# Patient Record
Sex: FEMALE | Race: WHITE | NOT HISPANIC OR LATINO | Employment: UNEMPLOYED | URBAN - METROPOLITAN AREA
[De-identification: names, ages, dates, MRNs, and addresses within clinical notes are randomized per-mention and may not be internally consistent; named-entity substitution may affect disease eponyms.]

---

## 2017-01-04 ENCOUNTER — HOSPITAL ENCOUNTER (EMERGENCY)
Facility: HOSPITAL | Age: 32
Discharge: HOME | End: 2017-01-04
Attending: EMERGENCY MEDICINE | Admitting: EMERGENCY MEDICINE
Payer: COMMERCIAL

## 2017-01-04 ENCOUNTER — APPOINTMENT (EMERGENCY)
Dept: RADIOLOGY | Facility: HOSPITAL | Age: 32
End: 2017-01-04
Payer: COMMERCIAL

## 2017-01-04 VITALS
HEART RATE: 78 BPM | OXYGEN SATURATION: 100 % | DIASTOLIC BLOOD PRESSURE: 70 MMHG | WEIGHT: 144.62 LBS | RESPIRATION RATE: 18 BRPM | SYSTOLIC BLOOD PRESSURE: 110 MMHG | TEMPERATURE: 97.7 F

## 2017-01-04 DIAGNOSIS — R00.2 RAPID PALPITATIONS: Primary | ICD-10-CM

## 2017-01-04 LAB
ANION GAP SERPL CALCULATED.3IONS-SCNC: 9 MMOL/L (ref 4–13)
ATRIAL RATE: 79 BPM
BASOPHILS # BLD AUTO: 0.02 THOUSANDS/ΜL (ref 0–0.1)
BASOPHILS NFR BLD AUTO: 0 % (ref 0–1)
BILIRUB UR QL STRIP: NEGATIVE
BUN SERPL-MCNC: 18 MG/DL (ref 5–25)
CALCIUM SERPL-MCNC: 9.3 MG/DL (ref 8.3–10.1)
CHLORIDE SERPL-SCNC: 105 MMOL/L (ref 100–108)
CLARITY UR: CLEAR
CO2 SERPL-SCNC: 27 MMOL/L (ref 21–32)
COLOR UR: YELLOW
CREAT SERPL-MCNC: 0.75 MG/DL (ref 0.6–1.3)
EOSINOPHIL # BLD AUTO: 0.07 THOUSAND/ΜL (ref 0–0.61)
EOSINOPHIL NFR BLD AUTO: 1 % (ref 0–6)
ERYTHROCYTE [DISTWIDTH] IN BLOOD BY AUTOMATED COUNT: 13.9 % (ref 11.6–15.1)
GFR SERPL CREATININE-BSD FRML MDRD: >60 ML/MIN/1.73SQ M
GLUCOSE SERPL-MCNC: 106 MG/DL (ref 65–140)
GLUCOSE UR STRIP-MCNC: NEGATIVE MG/DL
HCG UR QL: NEGATIVE
HCT VFR BLD AUTO: 36.7 % (ref 34.8–46.1)
HGB BLD-MCNC: 11.7 G/DL (ref 11.5–15.4)
HGB UR QL STRIP.AUTO: NEGATIVE
KETONES UR STRIP-MCNC: NEGATIVE MG/DL
LEUKOCYTE ESTERASE UR QL STRIP: NEGATIVE
LYMPHOCYTES # BLD AUTO: 1.33 THOUSANDS/ΜL (ref 0.6–4.47)
LYMPHOCYTES NFR BLD AUTO: 22 % (ref 14–44)
MCH RBC QN AUTO: 28.3 PG (ref 26.8–34.3)
MCHC RBC AUTO-ENTMCNC: 31.9 G/DL (ref 31.4–37.4)
MCV RBC AUTO: 89 FL (ref 82–98)
MONOCYTES # BLD AUTO: 0.34 THOUSAND/ΜL (ref 0.17–1.22)
MONOCYTES NFR BLD AUTO: 6 % (ref 4–12)
NEUTROPHILS # BLD AUTO: 4.44 THOUSANDS/ΜL (ref 1.85–7.62)
NEUTS SEG NFR BLD AUTO: 71 % (ref 43–75)
NITRITE UR QL STRIP: NEGATIVE
P AXIS: 81 DEGREES
PH UR STRIP.AUTO: 8 [PH] (ref 4.5–8)
PLATELET # BLD AUTO: 173 THOUSANDS/UL (ref 149–390)
PMV BLD AUTO: 11.8 FL (ref 8.9–12.7)
POTASSIUM SERPL-SCNC: 3.8 MMOL/L (ref 3.5–5.3)
PR INTERVAL: 166 MS
PROT UR STRIP-MCNC: NEGATIVE MG/DL
QRS AXIS: 71 DEGREES
QRSD INTERVAL: 108 MS
QT INTERVAL: 418 MS
QTC INTERVAL: 479 MS
RBC # BLD AUTO: 4.14 MILLION/UL (ref 3.81–5.12)
SODIUM SERPL-SCNC: 141 MMOL/L (ref 136–145)
SP GR UR STRIP.AUTO: 1.01 (ref 1–1.03)
T WAVE AXIS: 65 DEGREES
TSH SERPL DL<=0.05 MIU/L-ACNC: 1.45 UIU/ML (ref 0.36–3.74)
UROBILINOGEN UR QL STRIP.AUTO: 0.2 E.U./DL
VENTRICULAR RATE: 79 BPM
WBC # BLD AUTO: 6.2 THOUSAND/UL (ref 4.31–10.16)

## 2017-01-04 PROCEDURE — 71020 HB CHEST X-RAY 2VW FRONTAL&LATL: CPT

## 2017-01-04 PROCEDURE — 80048 BASIC METABOLIC PNL TOTAL CA: CPT | Performed by: EMERGENCY MEDICINE

## 2017-01-04 PROCEDURE — 96361 HYDRATE IV INFUSION ADD-ON: CPT

## 2017-01-04 PROCEDURE — 96360 HYDRATION IV INFUSION INIT: CPT

## 2017-01-04 PROCEDURE — 93005 ELECTROCARDIOGRAM TRACING: CPT

## 2017-01-04 PROCEDURE — 99285 EMERGENCY DEPT VISIT HI MDM: CPT

## 2017-01-04 PROCEDURE — 81025 URINE PREGNANCY TEST: CPT | Performed by: EMERGENCY MEDICINE

## 2017-01-04 PROCEDURE — 84443 ASSAY THYROID STIM HORMONE: CPT | Performed by: EMERGENCY MEDICINE

## 2017-01-04 PROCEDURE — 81003 URINALYSIS AUTO W/O SCOPE: CPT | Performed by: EMERGENCY MEDICINE

## 2017-01-04 PROCEDURE — 36415 COLL VENOUS BLD VENIPUNCTURE: CPT | Performed by: EMERGENCY MEDICINE

## 2017-01-04 PROCEDURE — 85025 COMPLETE CBC W/AUTO DIFF WBC: CPT | Performed by: EMERGENCY MEDICINE

## 2017-01-04 PROCEDURE — 93005 ELECTROCARDIOGRAM TRACING: CPT | Performed by: EMERGENCY MEDICINE

## 2017-01-04 RX ORDER — DIPHENOXYLATE HYDROCHLORIDE AND ATROPINE SULFATE 2.5; .025 MG/1; MG/1
1 TABLET ORAL DAILY
COMMUNITY

## 2017-01-04 RX ADMIN — SODIUM CHLORIDE 1000 ML: 0.9 INJECTION, SOLUTION INTRAVENOUS at 10:26

## 2017-01-11 ENCOUNTER — ALLSCRIPTS OFFICE VISIT (OUTPATIENT)
Dept: OTHER | Facility: OTHER | Age: 32
End: 2017-01-11

## 2017-02-07 ENCOUNTER — HOSPITAL ENCOUNTER (OUTPATIENT)
Dept: NON INVASIVE DIAGNOSTICS | Facility: CLINIC | Age: 32
Discharge: HOME | End: 2017-02-07
Payer: COMMERCIAL

## 2017-02-07 DIAGNOSIS — R00.2 PALPITATIONS: ICD-10-CM

## 2017-02-07 PROCEDURE — 93225 XTRNL ECG REC<48 HRS REC: CPT

## 2017-02-07 PROCEDURE — 93226 XTRNL ECG REC<48 HR SCAN A/R: CPT

## 2017-02-10 ENCOUNTER — GENERIC CONVERSION - ENCOUNTER (OUTPATIENT)
Dept: OTHER | Facility: OTHER | Age: 32
End: 2017-02-10

## 2017-11-20 ENCOUNTER — GENERIC CONVERSION - ENCOUNTER (OUTPATIENT)
Dept: OTHER | Facility: OTHER | Age: 32
End: 2017-11-20

## 2017-11-29 ENCOUNTER — ALLSCRIPTS OFFICE VISIT (OUTPATIENT)
Dept: OTHER | Facility: OTHER | Age: 32
End: 2017-11-29

## 2017-11-29 ENCOUNTER — GENERIC CONVERSION - ENCOUNTER (OUTPATIENT)
Dept: OTHER | Facility: OTHER | Age: 32
End: 2017-11-29

## 2017-11-29 DIAGNOSIS — Z3A.10 10 WEEKS GESTATION OF PREGNANCY: ICD-10-CM

## 2017-12-01 ENCOUNTER — GENERIC CONVERSION - ENCOUNTER (OUTPATIENT)
Dept: OTHER | Facility: OTHER | Age: 32
End: 2017-12-01

## 2017-12-01 ENCOUNTER — ALLSCRIPTS OFFICE VISIT (OUTPATIENT)
Dept: PERINATAL CARE | Facility: CLINIC | Age: 32
End: 2017-12-01
Payer: COMMERCIAL

## 2017-12-01 PROCEDURE — 76801 OB US < 14 WKS SINGLE FETUS: CPT | Performed by: OBSTETRICS & GYNECOLOGY

## 2017-12-04 ENCOUNTER — TRANSCRIBE ORDERS (OUTPATIENT)
Dept: LAB | Facility: CLINIC | Age: 32
End: 2017-12-04

## 2017-12-04 DIAGNOSIS — Z3A.10 10 WEEKS GESTATION OF PREGNANCY: Primary | ICD-10-CM

## 2017-12-11 ENCOUNTER — APPOINTMENT (OUTPATIENT)
Dept: LAB | Facility: CLINIC | Age: 32
End: 2017-12-11
Payer: COMMERCIAL

## 2017-12-11 DIAGNOSIS — Z3A.10 10 WEEKS GESTATION OF PREGNANCY: ICD-10-CM

## 2017-12-11 LAB
ABO GROUP BLD: NORMAL
ALBUMIN SERPL BCP-MCNC: 3.5 G/DL (ref 3.5–5)
ALP SERPL-CCNC: 55 U/L (ref 46–116)
ALT SERPL W P-5'-P-CCNC: 20 U/L (ref 12–78)
ANION GAP SERPL CALCULATED.3IONS-SCNC: 7 MMOL/L (ref 4–13)
AST SERPL W P-5'-P-CCNC: 16 U/L (ref 5–45)
BASOPHILS # BLD AUTO: 0.01 THOUSANDS/ΜL (ref 0–0.1)
BASOPHILS NFR BLD AUTO: 0 % (ref 0–1)
BILIRUB SERPL-MCNC: 0.4 MG/DL (ref 0.2–1)
BILIRUB UR QL STRIP: NEGATIVE
BLD GP AB SCN SERPL QL: NEGATIVE
BUN SERPL-MCNC: 14 MG/DL (ref 5–25)
CALCIUM SERPL-MCNC: 8.9 MG/DL (ref 8.3–10.1)
CHLORIDE SERPL-SCNC: 101 MMOL/L (ref 100–108)
CLARITY UR: CLEAR
CO2 SERPL-SCNC: 28 MMOL/L (ref 21–32)
COLOR UR: YELLOW
CREAT 24H UR-MRATE: 1.1 G/24HR (ref 0.6–1.8)
CREAT 24H UR-MRATE: 1.1 G/24HR (ref 0.6–1.8)
CREAT CL 24H UR+SERPL-VRATE: 137.92 ML/MIN (ref 75–115)
CREAT SERPL-MCNC: 0.57 MG/DL (ref 0.6–1.3)
CREAT UR-MCNC: 34.7 MG/DL
EOSINOPHIL # BLD AUTO: 0.02 THOUSAND/ΜL (ref 0–0.61)
EOSINOPHIL NFR BLD AUTO: 0 % (ref 0–6)
ERYTHROCYTE [DISTWIDTH] IN BLOOD BY AUTOMATED COUNT: 13.7 % (ref 11.6–15.1)
GFR SERPL CREATININE-BSD FRML MDRD: 121 ML/MIN/1.73SQ M
GLUCOSE P FAST SERPL-MCNC: 91 MG/DL (ref 65–99)
GLUCOSE UR STRIP-MCNC: NEGATIVE MG/DL
HBV SURFACE AG SER QL: NORMAL
HCT VFR BLD AUTO: 36.1 % (ref 34.8–46.1)
HGB BLD-MCNC: 11.9 G/DL (ref 11.5–15.4)
HGB UR QL STRIP.AUTO: NEGATIVE
KETONES UR STRIP-MCNC: NEGATIVE MG/DL
LEUKOCYTE ESTERASE UR QL STRIP: NEGATIVE
LYMPHOCYTES # BLD AUTO: 1.45 THOUSANDS/ΜL (ref 0.6–4.47)
LYMPHOCYTES NFR BLD AUTO: 20 % (ref 14–44)
MCH RBC QN AUTO: 29.2 PG (ref 26.8–34.3)
MCHC RBC AUTO-ENTMCNC: 33 G/DL (ref 31.4–37.4)
MCV RBC AUTO: 89 FL (ref 82–98)
MONOCYTES # BLD AUTO: 0.23 THOUSAND/ΜL (ref 0.17–1.22)
MONOCYTES NFR BLD AUTO: 3 % (ref 4–12)
NEUTROPHILS # BLD AUTO: 5.5 THOUSANDS/ΜL (ref 1.85–7.62)
NEUTS SEG NFR BLD AUTO: 77 % (ref 43–75)
NITRITE UR QL STRIP: NEGATIVE
PH UR STRIP.AUTO: 7.5 [PH] (ref 4.5–8)
PLATELET # BLD AUTO: 220 THOUSANDS/UL (ref 149–390)
PMV BLD AUTO: 10.8 FL (ref 8.9–12.7)
POTASSIUM SERPL-SCNC: 3.9 MMOL/L (ref 3.5–5.3)
PROT 24H UR-MCNC: <198 MG/24 HRS (ref 40–150)
PROT SERPL-MCNC: 7.5 G/DL (ref 6.4–8.2)
PROT UR STRIP-MCNC: NEGATIVE MG/DL
RBC # BLD AUTO: 4.08 MILLION/UL (ref 3.81–5.12)
RH BLD: POSITIVE
RUBV IGG SERPL IA-ACNC: 105.2 IU/ML
SODIUM SERPL-SCNC: 136 MMOL/L (ref 136–145)
SP GR UR STRIP.AUTO: 1.01 (ref 1–1.03)
SPECIMEN EXPIRATION DATE: NORMAL
SPECIMEN VOL UR: 3300 ML
URATE SERPL-MCNC: 2.1 MG/DL (ref 2–6.8)
UROBILINOGEN UR QL STRIP.AUTO: 0.2 E.U./DL
WBC # BLD AUTO: 7.21 THOUSAND/UL (ref 4.31–10.16)

## 2017-12-11 PROCEDURE — 81003 URINALYSIS AUTO W/O SCOPE: CPT | Performed by: OBSTETRICS & GYNECOLOGY

## 2017-12-11 PROCEDURE — 84156 ASSAY OF PROTEIN URINE: CPT

## 2017-12-11 PROCEDURE — 80081 OBSTETRIC PANEL INC HIV TSTG: CPT

## 2017-12-11 PROCEDURE — 82570 ASSAY OF URINE CREATININE: CPT

## 2017-12-11 PROCEDURE — 84550 ASSAY OF BLOOD/URIC ACID: CPT

## 2017-12-11 PROCEDURE — 87086 URINE CULTURE/COLONY COUNT: CPT

## 2017-12-11 PROCEDURE — 82575 CREATININE CLEARANCE TEST: CPT

## 2017-12-11 PROCEDURE — 80053 COMPREHEN METABOLIC PANEL: CPT

## 2017-12-11 PROCEDURE — 36415 COLL VENOUS BLD VENIPUNCTURE: CPT

## 2017-12-12 LAB
BACTERIA UR CULT: NORMAL
RPR SER QL: NORMAL

## 2017-12-13 LAB — HIV 1+2 AB+HIV1 P24 AG SERPL QL IA: NORMAL

## 2017-12-15 ENCOUNTER — GENERIC CONVERSION - ENCOUNTER (OUTPATIENT)
Dept: OTHER | Facility: OTHER | Age: 32
End: 2017-12-15

## 2017-12-15 ENCOUNTER — LAB REQUISITION (OUTPATIENT)
Dept: LAB | Facility: HOSPITAL | Age: 32
End: 2017-12-15
Payer: COMMERCIAL

## 2017-12-15 DIAGNOSIS — Z34.90 ENCOUNTER FOR SUPERVISION OF NORMAL PREGNANCY: ICD-10-CM

## 2017-12-15 PROCEDURE — 87491 CHLMYD TRACH DNA AMP PROBE: CPT | Performed by: PHYSICIAN ASSISTANT

## 2017-12-15 PROCEDURE — 87591 N.GONORRHOEAE DNA AMP PROB: CPT | Performed by: PHYSICIAN ASSISTANT

## 2017-12-18 LAB
CHLAMYDIA DNA CVX QL NAA+PROBE: NORMAL
N GONORRHOEA DNA GENITAL QL NAA+PROBE: NORMAL

## 2017-12-22 ENCOUNTER — GENERIC CONVERSION - ENCOUNTER (OUTPATIENT)
Dept: OTHER | Facility: OTHER | Age: 32
End: 2017-12-22

## 2017-12-22 ENCOUNTER — ALLSCRIPTS OFFICE VISIT (OUTPATIENT)
Dept: PERINATAL CARE | Facility: CLINIC | Age: 32
End: 2017-12-22
Payer: COMMERCIAL

## 2017-12-22 PROCEDURE — 76813 OB US NUCHAL MEAS 1 GEST: CPT | Performed by: OBSTETRICS & GYNECOLOGY

## 2017-12-26 ENCOUNTER — ALLSCRIPTS OFFICE VISIT (OUTPATIENT)
Dept: OTHER | Facility: OTHER | Age: 32
End: 2017-12-26

## 2017-12-26 ENCOUNTER — TRANSCRIBE ORDERS (OUTPATIENT)
Dept: LAB | Facility: CLINIC | Age: 32
End: 2017-12-26

## 2017-12-26 ENCOUNTER — APPOINTMENT (OUTPATIENT)
Dept: LAB | Facility: CLINIC | Age: 32
End: 2017-12-26
Payer: COMMERCIAL

## 2017-12-26 DIAGNOSIS — J06.9 ACUTE RESPIRATORY DISEASE: ICD-10-CM

## 2017-12-26 DIAGNOSIS — J06.9 ACUTE RESPIRATORY DISEASE: Primary | ICD-10-CM

## 2017-12-26 PROCEDURE — 87798 DETECT AGENT NOS DNA AMP: CPT

## 2017-12-27 LAB
FLUAV AG SPEC QL: NORMAL
FLUBV AG SPEC QL: NORMAL
RSV B RNA SPEC QL NAA+PROBE: NORMAL

## 2017-12-27 NOTE — PROGRESS NOTES
Assessment   1  Acute upper respiratory infection (465 9) (J06 9)    Plan   Acute upper respiratory infection    · Follow Up if Not Better Evaluation and Treatment  Follow-up  Status: Complete  Done:    63ZWX0631   · Drink at least 6 glasses of water or juice a day ; Status:Complete;   Done: 15ICK4634   · Good hand washing is one of the best ways to control the spread of germs ;    Status:Complete;   Done: 25INB9194   · The following home treatments may soothe a sore throat ; Status:Complete;   Done:    02QNZ3902   · (1) INFLUENZA A/B AND RSV, PCR, > 2 MOS AGE; Status:Active; Requested    for:78Pic9390;     Discussion/Summary      1  flu swab today rest voice hot fluids/drink plenty of fluids humidifier call if not better  The patient was counseled regarding instructions for management,-- patient and family education,-- impressions,-- risks and benefits of treatment options  Possible side effects of new medications were reviewed with the patient/guardian today  The treatment plan was reviewed with the patient/guardian  The patient/guardian understands and agrees with the treatment plan      Provider Comments   Provider Comments:    rapid Flu/PCR sent - if negative or if sx worsen before results are back(notified pt to call office), will need abx such as ceftin 500mg BID x10 to treat suspected sinus infection   given, pt using nebulizer machine per her OB, advised to follow OB recommendations for OTC meds/remedies      Chief Complaint   Patient is here with a cough with green/yellow mucus, sore throat, runny nose x 1 week      History of Present Illness   HPI: 28 y/o F here with c/o as above   started more than 1 week ago with body aches, fatigue, nasal congestion, sinus drip and feeling unwell   is currently 14 weeks pregnant and sx improved after a few days but started to worsen again over the last few days with productive cough with green phelgm and green nasal congestion, sinus pain/pressure and feeling unwell again  is taking OTC remedies per her OB recommendations but sx worsening as above   SOB/CP but +sick contacts -> daughter recently had URI losing her voice 2nd to coughing so much but pregnancy is going well otherwise   flu swab sent today other complaints    Hospital Based Practices Required Assessment:       Readiness To Learn: Receptive  Barriers To Learning: none  Education Completed: disease/condition,-- further treatment/follow-up-- and-- treatment/procedure      Teaching Method: verbal-- and-- written      Person Taught: patient      Evaluation Of Learning: verbalized/demonstrated understanding      Review of Systems        Constitutional: feeling poorly-- and-- chills, but-- no fever  ENT: sore throat-- and-- nasal discharge  Cardiovascular: no chest pain  The patient presents with complaints of cough, described as productive  Gastrointestinal: no diarrhea  Genitourinary: no dysuria  Musculoskeletal: arthralgias-- and-- myalgias  Integumentary: no rashes  Neurological: no headache  ROS reviewed  Active Problems   1  Encounter for  screening for nuchal translucency (V28 89) (Z36 82)   2  History of pre-eclampsia (V13 29) (Z87 59)   3  History of pre-eclampsia in prior pregnancy, currently pregnant in first trimester (V23 49)     (O09 291)   4  Need for influenza vaccination (V04 81) (Z23)   5  Pregnancy (V22 2) (Z34 90)   6  Pregnancy resulting from in vitro fertilization in first trimester (V23 85) (O09 811)    Past Medical History   Active Problems And Past Medical History Reviewed: The active problems and past medical history were reviewed and updated today        Social History    · Always uses seat belt   · Currently sexually active   · Currently working   · 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 [place]   · Daily caffeinated coffee consumption   ·    · Never a smoker   · No drug use   · Social alcohol use (Z78 9)   · Tea  The social history was reviewed and updated today  Current Meds    1  Aspirin 81 MG TABS; Therapy: (Recorded:29Nov2017) to Recorded   2  CoQ10 400 MG Oral Capsule; Therapy: (Recorded:77Iih2121) to Recorded   3  Folic Acid CAPS; Therapy: [phone]) to Recorded   4  Multiple Vitamin TABS; Therapy: [phone]) to Recorded   5  Vitamin D TABS; Therapy: (Recorded:29Nov2017) to Recorded     The medication list was reviewed and updated today  Allergies   1  Neosporin OINT  2  No Known Food Allergies   3  Pollen    Vitals    Recorded: 97QVI7309 12:54PM   Temperature 98 F   Heart Rate 86   Respiration 18   Systolic 059   Diastolic 68   Height 5 ft 7 in   Weight 146 lb 8 oz   BMI Calculated 22 95   BSA Calculated 1 77   O2 Saturation 98     Physical Exam        Constitutional      General appearance: Abnormal   appears tired  -- but in no acute distress  Head and Face      Palpation of the face and sinuses: Abnormal   Examination of the Sinuses: right maxillary tenderness-- and-- left maxillary tenderness  Eyes      Pupils and irises: Equal, round, reactive to light  Ears, Nose, Mouth, and Throat      Otoscopic examination: Tympanic membranes translucent with normal light reflex  Canals patent without erythema  Oropharynx: Abnormal   The posterior pharynx was erythematous, but-- did not have an exudate  Pulmonary      Respiratory effort: No increased work of breathing or signs of respiratory distress  Auscultation of lungs: Clear to auscultation  Cardiovascular      Auscultation of heart: Normal rate and rhythm, normal S1 and S2, no murmurs  Examination of extremities for edema and/or varicosities: Normal        Abdomen      Abdomen: Non-tender, no masses  Lymphatic      Palpation of lymph nodes in neck: No lymphadenopathy         Psychiatric      Judgment and insight: Normal        Mood and affect: Normal        Future Appointments      Date/Time Provider Specialty Site   2018 04:00 PM MICAELA Ayers  Obstetrics/Gynecology [facility] OB   01/15/2018 09:00 AM DOUG Thomas Obstetrics/Gynecology [facility] OB   2018 04:40 PM Coco Wilder DO Obstetrics/Gynecology [facility] OB   2018 03:00 PM  [place], [address] [place] [address] Road   2018 03:40 PM MICAELA Alonzo   Obstetrics/Gynecology [facility] OB     Signatures    Electronically signed by : Anand Jiménez DO; Dec 26 2017  4:40PM EST                       (Author)

## 2018-01-15 ENCOUNTER — GENERIC CONVERSION - ENCOUNTER (OUTPATIENT)
Dept: OTHER | Facility: OTHER | Age: 33
End: 2018-01-15

## 2018-01-15 ENCOUNTER — TRANSCRIBE ORDERS (OUTPATIENT)
Dept: LAB | Facility: CLINIC | Age: 33
End: 2018-01-15

## 2018-01-15 ENCOUNTER — APPOINTMENT (OUTPATIENT)
Dept: LAB | Facility: CLINIC | Age: 33
End: 2018-01-15
Payer: COMMERCIAL

## 2018-01-15 VITALS
HEIGHT: 67 IN | BODY MASS INDEX: 22.68 KG/M2 | WEIGHT: 144.5 LBS | OXYGEN SATURATION: 98 % | SYSTOLIC BLOOD PRESSURE: 118 MMHG | DIASTOLIC BLOOD PRESSURE: 82 MMHG | HEART RATE: 76 BPM | RESPIRATION RATE: 18 BRPM | TEMPERATURE: 97.9 F

## 2018-01-15 DIAGNOSIS — Z3A.17 17 WEEKS GESTATION OF PREGNANCY: ICD-10-CM

## 2018-01-15 DIAGNOSIS — Z3A.17 17 WEEKS GESTATION OF PREGNANCY: Primary | ICD-10-CM

## 2018-01-15 PROCEDURE — 36415 COLL VENOUS BLD VENIPUNCTURE: CPT

## 2018-01-15 NOTE — RESULT NOTES
Message   blood test results are back  blood sugar is normal at 85 and cholesterol looks fine at 143  iron levels are in normal range, but in lower portion of normal range  Any history of anemia(low blood count) when iron was low in the past?  is patient taking iron supplements at this time?  does patient have menstrual cycles(have record that patient is taking minastrin/Oral contraceptive)? if yes, any heavier bleeding?  let me know, thanks     Verified Results  (1) LIPID PANEL, FASTING [id number] 07:55AM [facility] Median     Test Name Result Flag Reference   CHOLESTEROL, TOTAL 143 mg/dL  125-200   HDL CHOLESTEROL 53 mg/dL  > OR = 46   TRIGLICERIDES 68 mg/dL  <118   LDL-CHOLESTEROL 76 mg/dL (calc)  <130   Desirable range <100 mg/dL for patients with CHD or  diabetes and <70 mg/dL for diabetic patients with  known heart disease  CHOL/HDLC RATIO 2 7 (calc)  < OR = 5 0   NON HDL CHOLESTEROL 90 mg/dL (calc)     Target for non-HDL cholesterol is 30 mg/dL higher than   LDL cholesterol target  (1) BASIC METABOLIC PROFILE [id number] 07:55AM [facility] Median     Test Name Result Flag Reference   GLUCOSE 85 mg/dL  65-99   Fasting reference interval   UREA NITROGEN (BUN) 14 mg/dL  7-25   CREATININE 0 89 mg/dL  0 50-1 10   eGFR NON-AFR   AMERICAN 86 mL/min/1 73m2  > OR = 60   eGFR AFRICAN AMERICAN 99 mL/min/1 73m2  > OR = 60   BUN/CREATININE RATIO   6-70   NOT APPLICABLE (calc)   SODIUM 139 mmol/L  135-146   POTASSIUM 3 9 mmol/L  3 5-5 3   CHLORIDE 104 mmol/L     CARBON DIOXIDE 30 mmol/L  20-31   CALCIUM 9 1 mg/dL  8 6-10 2     (1) IRON SATURATION %, TIBC [id number] 07:55AM Yuriy Malone     Test Name Result Flag Reference   IRON, TOTAL 110 mcg/dL     IRON BINDING CAPACITY 421 mcg/dL (calc)  250-450   % SATURATION 26 % (calc)  11-50     (1) OP TANGELA FERRITIN [id number] 07:55AM [facility]uri[facility] Malone   REPORT COMMENT:  FASTING:YES     Test Name Result Flag Reference   FERRITIN 30 ng/mL

## 2018-01-16 LAB — SCAN RESULT: NORMAL

## 2018-01-17 ENCOUNTER — GENERIC CONVERSION - ENCOUNTER (OUTPATIENT)
Dept: OTHER | Facility: OTHER | Age: 33
End: 2018-01-17

## 2018-01-17 NOTE — PROGRESS NOTES
Chief Complaint  pt was here for her flu shot      Active Problems    1  Acute bronchitis (466 0) (J20 9)   2  Acute pharyngitis (462) (J02 9)   3  Acute upper respiratory infection (465 9) (J06 9)   4  Acute viral conjunctivitis of left eye (077 99) (B30 9)   5  Contraception (V25 9) (Z30 9)   6  Encounter for gynecological examination without abnormal finding (V72 31) (Z01 419)   7  History of iron deficiency (V12 3) (Z86 39)   8  History of pre-eclampsia (V13 29) (Z87 59)   9  Screening for HPV (human papillomavirus) (V73 81) (Z11 51)    Current Meds   1  Amoxicillin 500 MG Oral Capsule; TAKE 1 CAPSULE 3 TIMES DAILY UNTIL GONE;   Therapy: 05QPH5361 to (Evaluate:28Oct2016)  Requested for: 36QAU4370; Last   Rx:21Oct2016 Ordered   2  Folic Acid CAPS; Therapy: (Wilhemina Mon) to Recorded   3  Minastrin 24 Fe 1-20 MG-MCG(24) Oral Tablet Chewable; One po daily; Therapy: 47ZPR4314 to (Evaluate:27Nov2016)  Requested for: 89NNP7500; Last   Rx:02Mar2016 Ordered   4  Multiple Vitamin TABS; Therapy: (Recorded:21Oct2016) to Recorded    Allergies    1  Neosporin OINT    2  No Known Food Allergies   3  Pollen    Assessment    1   Need for influenza vaccination (V04 81) (Z23)    Plan  Need for influenza vaccination    · Fluzone Quadrivalent Intramuscular Suspension    Signatures   Electronically signed by : Valdez Peterson DO; Dec  7 2016  6:20PM EST                       (Author)

## 2018-01-17 NOTE — RESULT NOTES
Message   Holter monitor test results are back and look okay, no abnormal heart rhythms  Is Grazyna Gilbert still having palpitations/symptoms since appt with me last month? if yes, recommend  cardiology garcia let me know, thx     Verified Results  HOLTER MONITOR - 48 HOUR [id number] 02:44PM Alfa Anders Order Number: [id number]    - Patient Instructions: To schedule this appointment, please contact Central Scheduling at [phone]  For Katrinmarilyn Clementyomi, please call [phone]  Test Name Result Flag Reference   HOLTER MONITOR - 48 HOUR (Report)     PT NAME: Kehinde Boyle   : 1985 AGE: 33 y o  GENDER: female   MRN: [id number]  PROCEDURE: Holter monitor - 48 hour     INDICATIONS: Palpitations     DESCRIPTION OF FINDINGS:   The patient was monitored for a total of 48 hours  The patient was predominantly in sinus rhythm throughout the study  The average heart rate was 72 beats per minute  The heart rate ranged from 36 to 156 beats per minute  Minimum Heart rate was at    4:42 AM  Maximum heart rate was at 7:05 AM      Ventricular ectopic activity consisted of 2 single beats  Supraventricular ectopic activity consisted of 460 single beats  There was no supraventricular tachycardia identified  There was no evidence of atrial fibrillation or atrial flutter  There were no significant pauses  The longest R-R interval was 1 8 seconds  There was no evidence of advanced degree heart block  The accompanying patient diary notes symptoms of rapid heart rate at 7:30 AM which correlated with sinus tachycardia without any other significant arrhythmias at the time  1  Unremarkable 48 hour Holter monitor for age      -----   Mamta Marrero MD, [facility]

## 2018-01-19 ENCOUNTER — GENERIC CONVERSION - ENCOUNTER (OUTPATIENT)
Dept: OTHER | Facility: OTHER | Age: 33
End: 2018-01-19

## 2018-01-22 VITALS
HEIGHT: 67 IN | DIASTOLIC BLOOD PRESSURE: 58 MMHG | SYSTOLIC BLOOD PRESSURE: 100 MMHG | BODY MASS INDEX: 22.63 KG/M2 | WEIGHT: 144.2 LBS

## 2018-01-23 VITALS
WEIGHT: 145.05 LBS | SYSTOLIC BLOOD PRESSURE: 120 MMHG | BODY MASS INDEX: 22.76 KG/M2 | DIASTOLIC BLOOD PRESSURE: 72 MMHG | HEART RATE: 94 BPM | HEIGHT: 67 IN

## 2018-01-23 VITALS
DIASTOLIC BLOOD PRESSURE: 68 MMHG | TEMPERATURE: 98 F | BODY MASS INDEX: 22.99 KG/M2 | SYSTOLIC BLOOD PRESSURE: 122 MMHG | HEART RATE: 86 BPM | WEIGHT: 146.5 LBS | HEIGHT: 67 IN | RESPIRATION RATE: 18 BRPM | OXYGEN SATURATION: 98 %

## 2018-01-23 VITALS
WEIGHT: 143.05 LBS | SYSTOLIC BLOOD PRESSURE: 122 MMHG | HEIGHT: 67 IN | HEART RATE: 86 BPM | BODY MASS INDEX: 22.45 KG/M2 | DIASTOLIC BLOOD PRESSURE: 70 MMHG

## 2018-01-23 NOTE — CONSULTS
I had the pleasure of evaluating your patient, Casey Kanner  My full evaluation follows:      Chief Complaint  Here for ultrasound study      History of Present Illness  Please refer to the ultrasound report for additional information  Active Problems    1  10 weeks gestation of pregnancy (V22 2) (Z3A 10)   2  Encounter for gynecological examination without abnormal finding (V72 31) (Z01 419)   3  History of pre-eclampsia (V13 29) (Z87 59)   4   Need for influenza vaccination (V04 81) (Z23)    Past Medical History    · History of Acute upper respiratory infection (465 9) (J06 9)   · History of Acute viral conjunctivitis of left eye (077 99) (B30 9)   · History of Anemia in pregnancy (648 20,285 9) (O99 019)   · History of Contraception (V25 9) (Z30 9)   · History of Gestational hypertension, third trimester (642 33) (O13 3)   · History of Headache, migraine (346 90) (G43 909)   · History of acute pharyngitis (V12 69) (Z87 09)   · History of iron deficiency (V12 3) (Z86 39)   · History of pregnancy (V13 29)   · History of  delivery, currently pregnant, first trimester (V23 41) (O09 211)   · History of Multiple marker screen positive for Down syndrome (796 5) (O28 5)   · History of Need for Tdap vaccination (V06 1) (Z23)   · History of Palpitations (785 1) (R00 2)   · History of Pregnancy resulting from in vitro fertilization (V23 85) (O09 819)   · History of Screening for HPV (human papillomavirus) (V73 81) (Z11 51)    Surgical History    · History of  Section Low Transverse   · 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

## 2018-01-23 NOTE — PROGRESS NOTES
DEC 1 2017         RE: Santino Tapia                                   To: [facility]  MR#: [id number]                                   [address]   : [address]   ENC: [id number]:[address]   (Exam #: [id number])                           Fax: [phone]      The LMP of this 27year old,  G2, P0-1-0-1 patient was unknown, her   working VASILE is  and the current gestational age is 10 weeks 3   days by IV Fertilization  A sonographic examination was performed on DEC 1   2017 using real time equipment  The ultrasound examination was performed   using abdominal technique  The patient has a BMI of 22 6  Her blood   pressure today was 122/70  Multiple longitudinal and transverse sections revealed a paz   intrauterine pregnancy with the fetus in variable presentation  The   placenta is anterior in implantation  A normal gestational sac was documented  A normal fetal pole was   visualized  Cardiac motion was observed at 161 bpm  The yolk sac was not   seen  INDICATIONS      prior preeclampsia   in vitro fertilization   confirm gestational age      Exam Types      Level I      RESULTS      Fetus # 1 of 1   Variable presentation   Fetal growth appeared normal      MEASUREMENTS (* Included In Average GA)      CRL              3 8 cm        10 weeks 4 days*      THE AVERAGE GESTATIONAL AGE is 10 weeks 4 days +/- 7 days  ANATOMY COMMENTS      Anatomic detail is extremely limited at this gestational age  A discrete   fetal pole with cardiac motion was documented  Limb buds were documented   as well  The gestational sac is normal in appearance and located in the   fundus of the uterus  No gross abnormalities were noted on this   examination  Free fluid is not seen in the posterior cul-de-sac  There is   no suspicion of a subchorionic hematoma        ADNEXA      The left ovary is surgically absent  The right ovary appeared normal and   measured 9 4 x 6 7 x 5 5 cm with a volume of 181 2 cc  IMPRESSION      George IUP   10 weeks and 4 days by this ultrasound  (VASILE=2018)   10 weeks and 3 days by IV Fertilization  (VASILE=2018)   Variable presentation   Fetal growth appeared normal   Regular fetal heart rate of 161 bpm   Anterior placenta      CONSULT COMMENT      Thank you very much for your kind referral of Prabhakar Farris to the [facility] [place], operated by [facility] in [place] on 2017 for first trimester ultrasound   evaluation and MFM consult  Delia Young is a 75-year-old  2 para 65   white female who is currently at 10-3/7 weeks gestation by an estimated   due date of 2018 which is based upon in vitro fertilization  She   presents for the indications of IVF and a history of a prior indicated    birth secondary to preeclampsia with severe features  Delia Young has no   complaints today  She denies vaginal bleeding  Her early prenatal course   has been unremarkable  She had transfer of a single fresh five-day embryo   embryo on   Preimplantation genetic diagnosis was not performed  She is currently taking 81 mg of aspirin a day for the indication of a   history of prior preeclampsia  Delia Young conceives her only other prior pregnancy via in vitro fertilization  She was delivered by  section at 34-2/7 weeks gestation in    for the indication of preeclampsia with severe features  She delivered a 4   lbs  1 oz  baby girl who had a 3 week NICU course, and is currently   healthy  Delia Young has an unremarkable past medical history  Her past surgical   history is otherwise significant for a left oophorectomy secondary to a   large ovarian cyst  She takes no medication otherwise with the exception   of a prenatal vitamin on a daily basis  She has an allergy to Neosporin  Junious Grief denies tobacco, alcohol, or illicit drug use during the pregnancy     The family genetic history is noncontributory  Today's ultrasound findings and suggested follow-up were discussed in   detail with Heena Mayo  She will [facility] to the [facility]  at 13 weeks   gestation for follow-up MFM evaluation, including the first trimester   component of the Sequential Screen which was discussed today  Level II   ultrasound evaluation will be performed at 20 weeks gestation  We   discussed that pregnancies conceived by IVF have an increased risk for   fetal structural cardiac abnormalities  Accordingly, fetal   echocardiography will be performed at 22-24 weeks gestation  Her history   of IVF and prior pregnancy complicated by preeclampsia with severe   features is associated with an increased risk for fetal growth   restriction  Serial fetal growth scans are recommended during the second   half of the pregnancy  I agree with treatment with 81 mg of aspirin a day,   which will significantly reduce her risk for recurrence of preeclampsia   during this pregnancy  The face to face time, in addition to time spent discussing ultrasound   results, was approximately 15 minutes, greater than 50% of which was spent   during counseling and coordination of care  TRACIE Licea M D     Maternal-Fetal Medicine   Electronically signed 12/02/17 09:12

## 2018-01-23 NOTE — RESULT NOTES
Discussion/Summary   Did Lauren Morales get her flu test results yet? if no, results came back from lab today and are negative  I hope she is feeling better!      Verified Results  (1) INFLUENZA A/B AND RSV, PCR, > 2 MOS AGE [id number] 01:36PM Jose Chang     Test Name Result Flag Reference   INFLUENZA A/MATRIX None Detected  None Detected   INFLUENZA B None Detected  None Detected   RESP SYNCYTIAL VIRUS None Detected  None Detected

## 2018-01-23 NOTE — CONSULTS
I had the pleasure of evaluating your patient, Jocelyne Anaya  My full evaluation follows:      Chief Complaint  Here for ultrasound study      History of Present Illness  Please refer to the ultrasound report for additional information  Active Problems    1  History of pre-eclampsia (V13 29) (Z87 59)   2  History of pre-eclampsia in prior pregnancy, currently pregnant in first trimester (V23 49)   (O09 291)   3  Need for influenza vaccination (V04 81) (Z23)   4  Pregnancy (V22 2) (Z34 90)   5  Pregnancy resulting from in vitro fertilization in first trimester (V23 85) (O09 811)    Past Medical History    · History of Gestational hypertension, third trimester (642 33) (O13 3)   · History of Headache, migraine (346 90) (G43 909)   · History of  delivery, currently pregnant, first trimester (V23 41) (O09 211)   · History of Palpitations (785 1) (R00 2)    Surgical History    · History of  Section Low Transverse   · 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 heart disease    Social History    · Always uses seat belt   · Currently sexually active   · Currently working   · Daily caffeinated coffee consumption   ·    · Never a smoker   · No drug use   · Social alcohol use (Z78 9)   · Tea    Current Meds   1  Aspirin 81 MG TABS; Therapy: (Recorded:07Ivj8489) to Recorded   2  CoQ10 400 MG Oral Capsule; Therapy: (Recorded:81Raf0700) to Recorded   3  Folic Acid CAPS; Therapy: (April Saunders) to Recorded   4  Multiple Vitamin TABS; Therapy: (April Saunders) to Recorded   5  Vitamin D TABS;    Therapy: (Recorded:29Nov2017) to Recorded    Allergies    1  Neosporin OINT    2  No Known Food Allergies   3  Pollen    Vitals   Recorded: 22Dec2017 02:32PM   Heart Rate 94   Systolic 040, LUE, Sitting   Diastolic 72, LUE, Sitting   Height 5 ft 7 in   Weight 145 lb 0 8 oz   BMI Calculated 22 72   BSA Calculated 1 76   Pain Scale 0     Results/Data  Exam description: first trimester ultrasound  Findings: normal first trimester obstetrical ultrasound, Please refer to the ultrasound report for additional information  Discussion/Summary    Please refer to the ultrasound report for additional information  The patient was counseled regarding diagnostic results, instructions for management, prognosis, impressions  Thank you very much for allowing me to participate in the care of this patient  If you have any questions, please do not hesitate to contact me        Future Appointments    Signatures   Electronically signed by : MICAELA Barney ; Dec 22 2017  4:24PM EST                       (Author)

## 2018-01-23 NOTE — RESULT NOTES
Verified Results  (1) SEQUENTIAL SCREEN 2 [id number] 03:29PM Daniela Ratliff     Test Name Result Flag Reference   SCAN RESULT      [facility]

## 2018-01-23 NOTE — PROGRESS NOTES
DEC 22 2017         RE: Ramón Hayden                                   To: [address] [facility]  MR#: [id number]                                   [facility]  : [facility]: [id number]                             Sami Tang U  6    (Exam #: [id number])                           Fax: [phone]      The LMP of this 27year old,  G2, P0-1-0-1 patient was unknown, her   working VASILE is  and the current gestational age is 17 weeks 3   days by IV Fertilization  A sonographic examination was performed on DEC   22 2017 using real time equipment  The ultrasound examination was   performed using abdominal technique  The patient has a BMI of 22 9  Her   blood pressure today was 120/72  WIth the exception of upper respiratory infection symptoms, Deirdre Girt has no   complaints  She denies vaginal bleeding  She is taking 81 mg of aspirin a   day to reduce her risk for recurrence of preeclampsia during this   pregnancy  She presents for genetic screening today  Multiple longitudinal and transverse sections revealed a paz   intrauterine pregnancy with the fetus in variable presentation  The   placenta is anterior in implantation  A normal gestational sac was documented  A normal fetal pole was   visualized  Cardiac motion was observed at 152 bpm  The yolk sac was seen  The amnion was also documented  INDICATIONS      first trimester genetic screening      Exam Types      sequential screen      RESULTS      Fetus # 1 of 1   Variable presentation   Fetal growth appeared normal      MEASUREMENTS (* Included In Average GA)      CRL              7 9 cm        13 weeks 4 days*   Nuchal Trans    2 20 mm      THE AVERAGE GESTATIONAL AGE is 13 weeks 4 days +/- 7 days  ANATOMY COMMENTS      Anatomic detail is limited at this gestational age    The fetal cranium   appeared normal in shape and the nuchal translucency was normal in size at   2 2 mm  The nasal bone appears to be present  The intracranial anatomy   was unremarkable  Evaluation of the spine revealed no obvious evidence   for a neural tube defect  Anatomy of the fetal thorax appeared within   normal limits with a normal cardiac axis  The cardiac rhythm was regular   and documented with M-mode  Within the abdomen, the stomach & bladder   were visualized and the abdominal wall appeared intact  A three vessel   cord appears to be present  Active movement of the fetal body &   extremities was seen  There is no suspicion of a subchorionic bleed  The   placental cord insertion appeared normal    There is no suspicion of a   uterine myoma  Free fluid is not seen in the posterior cul-de-sac  ADNEXA      The left ovary is surgically absent  The right ovary was enlarged and   measured 10 3 x 8 1 x 6 0 cm with a volume of 261 8 cc  The right ovary is   enlarged, multicystic in appearance  MASSES      1)  Right ovarian cystic mass  The cyst measured 3 32 x 2 42 x 2 77 cm,   simple in appearance with a smooth lining  AMNIOTIC FLUID         Largest Vertical Pocket = 3 0 cm   Amniotic Fluid: Normal      IMPRESSION      George IUP   13 weeks and 4 days by this ultrasound  (VASILE=JUN 25 2018)   13 weeks and 3 days by IV Fertilization  (VASILE=JUN 26 2018)   Variable presentation   Fetal growth appeared normal   Regular fetal heart rate of 152 bpm   Anterior placenta      GENERAL COMMENT      Today's ultrasound findings and suggested follow-up were discussed in   detail with Talya Cao  The Sequential Screen was discussed in detail, including   the sensitivities for detection of Down syndrome, Trisomy 18, and open   neural tube defects  Definitive prenatal diagnosis is possible only   through genetic amniocentesis or CVS   Talya Cao underwent fingerstick blood   collection for hCG and ELBERT-A to complete the initial component of the   Sequential Screen    Results should be available within one week  Follow-up multiple marker serum screening at 16-18 weeks gestation is   recommended to complete the Sequential Screen  Fetal Level II ultrasound   imaging is scheduled at about 20 weeks gestation Fetal echocardiography   will be performed at 22-24 weeks gestation  Continuation of low-dose   aspirin therapy is recommended  The face to face time, in addition to time spent discussing ultrasound   results, was approximately 10 minutes, greater than 50% of which was spent   during counseling and coordination of care  TRACIE Henley M D     Maternal-Fetal Medicine   Electronically signed 12/22/17 16:29

## 2018-01-24 VITALS — DIASTOLIC BLOOD PRESSURE: 72 MMHG | WEIGHT: 144 LBS | BODY MASS INDEX: 22.55 KG/M2 | SYSTOLIC BLOOD PRESSURE: 122 MMHG

## 2018-01-24 VITALS
WEIGHT: 146.38 LBS | HEIGHT: 67 IN | SYSTOLIC BLOOD PRESSURE: 112 MMHG | DIASTOLIC BLOOD PRESSURE: 62 MMHG | BODY MASS INDEX: 22.98 KG/M2

## 2018-01-30 ENCOUNTER — CLINICAL SUPPORT (OUTPATIENT)
Dept: INTERNAL MEDICINE CLINIC | Facility: CLINIC | Age: 33
End: 2018-01-30
Payer: COMMERCIAL

## 2018-01-30 DIAGNOSIS — Z23 FLU VACCINE NEED: Primary | ICD-10-CM

## 2018-01-30 PROCEDURE — 99999 PR OFFICE/OUTPT VISIT,PROCEDURE ONLY: CPT | Performed by: INTERNAL MEDICINE

## 2018-01-30 PROCEDURE — 90688 IIV4 VACCINE SPLT 0.5 ML IM: CPT | Performed by: INTERNAL MEDICINE

## 2018-01-30 PROCEDURE — 90471 IMMUNIZATION ADMIN: CPT | Performed by: INTERNAL MEDICINE

## 2018-02-09 ENCOUNTER — ROUTINE PRENATAL (OUTPATIENT)
Dept: PERINATAL CARE | Facility: CLINIC | Age: 33
End: 2018-02-09
Payer: COMMERCIAL

## 2018-02-09 VITALS
SYSTOLIC BLOOD PRESSURE: 109 MMHG | WEIGHT: 152.4 LBS | HEIGHT: 66 IN | DIASTOLIC BLOOD PRESSURE: 72 MMHG | BODY MASS INDEX: 24.49 KG/M2 | HEART RATE: 86 BPM

## 2018-02-09 DIAGNOSIS — O09.812 PREGNANCY RESULTING FROM IN VITRO FERTILIZATION IN SECOND TRIMESTER: Primary | ICD-10-CM

## 2018-02-09 DIAGNOSIS — O34.219 MATERNAL CARE FOR SCAR FROM PREVIOUS CESAREAN DELIVERY, UNSPECIFIED PRIOR CESAREAN DELIVERY TYPE: ICD-10-CM

## 2018-02-09 DIAGNOSIS — Z3A.20 20 WEEKS GESTATION OF PREGNANCY: ICD-10-CM

## 2018-02-09 DIAGNOSIS — Z36.86 ENCOUNTER FOR ANTENATAL SCREENING FOR CERVICAL LENGTH: ICD-10-CM

## 2018-02-09 PROCEDURE — 76817 TRANSVAGINAL US OBSTETRIC: CPT | Performed by: OBSTETRICS & GYNECOLOGY

## 2018-02-09 PROCEDURE — 76811 OB US DETAILED SNGL FETUS: CPT | Performed by: OBSTETRICS & GYNECOLOGY

## 2018-02-09 PROCEDURE — 99212 OFFICE O/P EST SF 10 MIN: CPT | Performed by: OBSTETRICS & GYNECOLOGY

## 2018-02-09 NOTE — PROGRESS NOTES
Maternal-Fetal Medicine: Ms Dioni Sainz was seen today in the [address] today for anatomic survey and cervical length screening ultrasound  Please see ultrasound report under "OB Procedures" tab in EPIC   Thank you for the referral and please don't hesitate to contact our office with any concerns or questions      Sincerely,  Rita Asencio MD  Attending Physician, Maternal-Fetal Medicine

## 2018-02-19 ENCOUNTER — ROUTINE PRENATAL (OUTPATIENT)
Dept: OBGYN CLINIC | Facility: CLINIC | Age: 33
End: 2018-02-19

## 2018-02-19 VITALS
WEIGHT: 155.2 LBS | HEIGHT: 67 IN | BODY MASS INDEX: 24.36 KG/M2 | DIASTOLIC BLOOD PRESSURE: 60 MMHG | SYSTOLIC BLOOD PRESSURE: 110 MMHG

## 2018-02-19 DIAGNOSIS — O09.812 PREGNANCY RESULTING FROM IN VITRO FERTILIZATION IN SECOND TRIMESTER: ICD-10-CM

## 2018-02-19 DIAGNOSIS — O09.299 HISTORY OF PRE-ECLAMPSIA IN PRIOR PREGNANCY, CURRENTLY PREGNANT: ICD-10-CM

## 2018-02-19 DIAGNOSIS — Z34.92 ENCOUNTER FOR PREGNANCY RELATED EXAMINATION IN SECOND TRIMESTER: Primary | ICD-10-CM

## 2018-02-19 DIAGNOSIS — Z90.721 S/P REMOVAL OF LEFT OVARY: ICD-10-CM

## 2018-02-19 DIAGNOSIS — O34.219 HISTORY OF CESAREAN DELIVERY, CURRENTLY PREGNANT: ICD-10-CM

## 2018-02-19 DIAGNOSIS — O09.899 HISTORY OF PRETERM DELIVERY, CURRENTLY PREGNANT: ICD-10-CM

## 2018-02-19 PROCEDURE — PNV: Performed by: NURSE PRACTITIONER

## 2018-02-19 NOTE — ASSESSMENT & PLAN NOTE
H/o prior C/S for preE at 34+ weeks  The patient reports she is open to discussion of TOLAC but has no objection to repeat LTC/S  At [facility] the risks/benefits of each were reviewed and she agreed to review again in 3rd trimester

## 2018-02-19 NOTE — ASSESSMENT & PLAN NOTE
Denies OB complaints  Good fetal movement  Denies contractions, cramping, leakage of fluid or vaginal bleeding  L2 WNL  Baby boy  S/p flu vaccine  Reviewed reasons to call

## 2018-02-19 NOTE — PROGRESS NOTES
Problem List Items Addressed This Visit     Encounter for pregnancy related examination in second trimester - Primary     Denies OB complaints  Good fetal movement  Denies contractions, cramping, leakage of fluid or vaginal bleeding  L2 WNL  Baby boy  S/p flu vaccine  Reviewed reasons to call  Relevant Orders    CBC and differential    Glucose, 1H PG    RPR    S/P removal of left ovary    History of  delivery, currently pregnant    History of  delivery, currently pregnant     H/o prior C/S for preE at 34+ weeks  The patient reports she is open to discussion of TOLAC but has no objection to repeat LTC/S  At [facility] the risks/benefits of each were reviewed and she agreed to review again in 3rd trimester  History of pre-eclampsia in prior pregnancy, currently pregnant     Baseline preE labs WNL  The patient is aware of sx to report  Pregnancy resulting from in vitro fertilization in second trimester     Fetal echo scheduled at 24 weeks

## 2018-03-06 ENCOUNTER — TELEPHONE (OUTPATIENT)
Dept: OBGYN CLINIC | Facility: CLINIC | Age: 33
End: 2018-03-06

## 2018-03-06 NOTE — TELEPHONE ENCOUNTER
Patient is 24 weeks gestation with history of  labor at 29 wks/ PreE  Called to report some light spotting yesterday associated with mild menstrual like cramping  States was doing a moderate amount of house work on   States baby is moving well  Denies LOF, visual changes , headaches and stomach pain  B/P at recent visits have been WNL  Reassured sounds normal  Can have some spotting after intercourse, heavy lifting,exercise  Continue to monitor and report and changes or new symptoms  Jayant Lugo understanding  Next routine appt 3/9/18

## 2018-03-07 NOTE — PROGRESS NOTES
Education  Baby & Me Education 1st Trimester:   First Trimester Education provided:   benefits of breastfeeding, importance of exclusive breastfeeding, early initiation of breastfeeding, exclusive breastfeeding for the first 6 months and Pregnancy Essentials Reference Guide given   The patient is planning on breastfeeding  Individualized education given: pt plans to pump breasts & feed baby via bottle     Prenatal education provided by: Emerson Gonzalez Rn, [facility]      Signatures   Electronically signed by : Hemanth Coffman RN; Nov 29 2017  9:39AM EST                       (Author)

## 2018-03-09 ENCOUNTER — ROUTINE PRENATAL (OUTPATIENT)
Dept: PERINATAL CARE | Facility: CLINIC | Age: 33
End: 2018-03-09
Payer: COMMERCIAL

## 2018-03-09 VITALS
DIASTOLIC BLOOD PRESSURE: 81 MMHG | WEIGHT: 162.6 LBS | SYSTOLIC BLOOD PRESSURE: 117 MMHG | HEIGHT: 67 IN | HEART RATE: 91 BPM | BODY MASS INDEX: 25.52 KG/M2

## 2018-03-09 DIAGNOSIS — Z3A.24 24 WEEKS GESTATION OF PREGNANCY: ICD-10-CM

## 2018-03-09 DIAGNOSIS — O09.812 PREGNANCY RESULTING FROM IN VITRO FERTILIZATION IN SECOND TRIMESTER: Primary | ICD-10-CM

## 2018-03-09 PROCEDURE — 76827 ECHO EXAM OF FETAL HEART: CPT | Performed by: OBSTETRICS & GYNECOLOGY

## 2018-03-09 PROCEDURE — 76825 ECHO EXAM OF FETAL HEART: CPT | Performed by: OBSTETRICS & GYNECOLOGY

## 2018-03-09 PROCEDURE — 93325 DOPPLER ECHO COLOR FLOW MAPG: CPT | Performed by: OBSTETRICS & GYNECOLOGY

## 2018-03-13 ENCOUNTER — ROUTINE PRENATAL (OUTPATIENT)
Dept: OBGYN CLINIC | Facility: CLINIC | Age: 33
End: 2018-03-13

## 2018-03-13 VITALS — WEIGHT: 161.8 LBS | SYSTOLIC BLOOD PRESSURE: 112 MMHG | BODY MASS INDEX: 25.34 KG/M2 | DIASTOLIC BLOOD PRESSURE: 64 MMHG

## 2018-03-13 DIAGNOSIS — O09.812 PREGNANCY RESULTING FROM IN VITRO FERTILIZATION IN SECOND TRIMESTER: ICD-10-CM

## 2018-03-13 DIAGNOSIS — Z34.92 ENCOUNTER FOR PREGNANCY RELATED EXAMINATION IN SECOND TRIMESTER: Primary | ICD-10-CM

## 2018-03-13 PROCEDURE — PNV: Performed by: NURSE PRACTITIONER

## 2018-03-13 NOTE — PROGRESS NOTES
Problem List Items Addressed This Visit     Encounter for pregnancy related examination in second trimester - Primary     Denies OB complaints  Good fetal movement  Denies contractions, cramping, leakage of fluid or vaginal bleeding  28 wk labs provided  S/p flu vaccine  Reviewed reasons to call  Pregnancy resulting from in vitro fertilization in second trimester     Normal fetal echo

## 2018-03-13 NOTE — ASSESSMENT & PLAN NOTE
Denies OB complaints  Good fetal movement  Denies contractions, cramping, leakage of fluid or vaginal bleeding  28 wk labs provided  S/p flu vaccine  Reviewed reasons to call

## 2018-03-24 ENCOUNTER — APPOINTMENT (OUTPATIENT)
Dept: LAB | Facility: CLINIC | Age: 33
End: 2018-03-24
Payer: COMMERCIAL

## 2018-03-24 DIAGNOSIS — Z34.92 ENCOUNTER FOR PREGNANCY RELATED EXAMINATION IN SECOND TRIMESTER: ICD-10-CM

## 2018-03-24 LAB
BASOPHILS # BLD AUTO: 0.02 THOUSANDS/ΜL (ref 0–0.1)
BASOPHILS NFR BLD AUTO: 0 % (ref 0–1)
EOSINOPHIL # BLD AUTO: 0.06 THOUSAND/ΜL (ref 0–0.61)
EOSINOPHIL NFR BLD AUTO: 1 % (ref 0–6)
ERYTHROCYTE [DISTWIDTH] IN BLOOD BY AUTOMATED COUNT: 13.7 % (ref 11.6–15.1)
GLUCOSE 1H P 50 G GLC PO SERPL-MCNC: 81 MG/DL
HCT VFR BLD AUTO: 32.5 % (ref 34.8–46.1)
HGB BLD-MCNC: 10.7 G/DL (ref 11.5–15.4)
LYMPHOCYTES # BLD AUTO: 1.61 THOUSANDS/ΜL (ref 0.6–4.47)
LYMPHOCYTES NFR BLD AUTO: 16 % (ref 14–44)
MCH RBC QN AUTO: 30.3 PG (ref 26.8–34.3)
MCHC RBC AUTO-ENTMCNC: 32.9 G/DL (ref 31.4–37.4)
MCV RBC AUTO: 92 FL (ref 82–98)
MONOCYTES # BLD AUTO: 0.43 THOUSAND/ΜL (ref 0.17–1.22)
MONOCYTES NFR BLD AUTO: 4 % (ref 4–12)
NEUTROPHILS # BLD AUTO: 7.97 THOUSANDS/ΜL (ref 1.85–7.62)
NEUTS SEG NFR BLD AUTO: 79 % (ref 43–75)
PLATELET # BLD AUTO: 218 THOUSANDS/UL (ref 149–390)
PMV BLD AUTO: 10.3 FL (ref 8.9–12.7)
RBC # BLD AUTO: 3.53 MILLION/UL (ref 3.81–5.12)
WBC # BLD AUTO: 10.09 THOUSAND/UL (ref 4.31–10.16)

## 2018-03-24 PROCEDURE — 36415 COLL VENOUS BLD VENIPUNCTURE: CPT

## 2018-03-24 PROCEDURE — 85025 COMPLETE CBC W/AUTO DIFF WBC: CPT

## 2018-03-24 PROCEDURE — 86592 SYPHILIS TEST NON-TREP QUAL: CPT

## 2018-03-24 PROCEDURE — 82950 GLUCOSE TEST: CPT

## 2018-03-26 ENCOUNTER — TELEPHONE (OUTPATIENT)
Dept: OBGYN CLINIC | Facility: CLINIC | Age: 33
End: 2018-03-26

## 2018-03-26 LAB — RPR SER QL: NORMAL

## 2018-03-26 NOTE — TELEPHONE ENCOUNTER
----- Message from Alisia Hope, [address] sent at 3/26/2018  1:06 PM EDT -----  CBC with mild anemia - please recommend starting Fe 325mg PO daily with vitamin C to aid in absorption   Normal glucola and neg RPR

## 2018-04-06 ENCOUNTER — ULTRASOUND (OUTPATIENT)
Dept: PERINATAL CARE | Facility: CLINIC | Age: 33
End: 2018-04-06
Payer: COMMERCIAL

## 2018-04-06 VITALS
WEIGHT: 169.1 LBS | HEART RATE: 97 BPM | DIASTOLIC BLOOD PRESSURE: 79 MMHG | BODY MASS INDEX: 26.54 KG/M2 | HEIGHT: 67 IN | SYSTOLIC BLOOD PRESSURE: 120 MMHG

## 2018-04-06 DIAGNOSIS — O09.813 PREGNANCY RESULTING FROM IN VITRO FERTILIZATION IN THIRD TRIMESTER: Primary | ICD-10-CM

## 2018-04-06 DIAGNOSIS — O09.293 HX OF PREECLAMPSIA, PRIOR PREGNANCY, CURRENTLY PREGNANT, THIRD TRIMESTER: ICD-10-CM

## 2018-04-06 DIAGNOSIS — Z3A.28 28 WEEKS GESTATION OF PREGNANCY: ICD-10-CM

## 2018-04-06 PROBLEM — O09.812 PREGNANCY RESULTING FROM IN VITRO FERTILIZATION IN SECOND TRIMESTER: Status: RESOLVED | Noted: 2018-02-19 | Resolved: 2018-04-06

## 2018-04-06 PROBLEM — Z34.92 ENCOUNTER FOR PREGNANCY RELATED EXAMINATION IN SECOND TRIMESTER: Status: RESOLVED | Noted: 2018-02-19 | Resolved: 2018-04-06

## 2018-04-06 PROCEDURE — 76816 OB US FOLLOW-UP PER FETUS: CPT | Performed by: OBSTETRICS & GYNECOLOGY

## 2018-04-06 PROCEDURE — 99212 OFFICE O/P EST SF 10 MIN: CPT | Performed by: OBSTETRICS & GYNECOLOGY

## 2018-04-06 RX ORDER — FERROUS SULFATE 325(65) MG
325 TABLET ORAL
COMMUNITY
End: 2020-03-06

## 2018-04-06 NOTE — PROGRESS NOTES
Please refer to the [facility] ultrasound report in Ob Procedures for additional information regarding the visit to the [facility], [facility]  today

## 2018-04-06 NOTE — LETTER
April 6, 2018     Mario Bazan MD  [address] [place] [id number]    Patient: Anne Peralta   YOB: 1985   Date of Visit: 4/6/2018       Dear Dr Milagros Tello: Thank you for referring Anne Peralta to me for evaluation  Below are my notes for this consultation  If you have questions, please do not hesitate to call me  I look forward to following your patient along with you  Sincerely,        Glen Jackson MD        CC: No Recipients  Glen [place] MD  4/6/2018  8:35 AM  Sign at close encounter  Please refer to the [facility] ultrasound report in Ob Procedures for additional information regarding [facility]  today

## 2018-04-10 ENCOUNTER — ROUTINE PRENATAL (OUTPATIENT)
Dept: OBGYN CLINIC | Facility: CLINIC | Age: 33
End: 2018-04-10

## 2018-04-10 VITALS — BODY MASS INDEX: 26.53 KG/M2 | DIASTOLIC BLOOD PRESSURE: 70 MMHG | SYSTOLIC BLOOD PRESSURE: 120 MMHG | WEIGHT: 169.4 LBS

## 2018-04-10 DIAGNOSIS — O34.219 HISTORY OF CESAREAN DELIVERY, CURRENTLY PREGNANT: ICD-10-CM

## 2018-04-10 DIAGNOSIS — O09.813 PREGNANCY RESULTING FROM IN VITRO FERTILIZATION IN THIRD TRIMESTER: ICD-10-CM

## 2018-04-10 DIAGNOSIS — O09.293 HX OF PREECLAMPSIA, PRIOR PREGNANCY, CURRENTLY PREGNANT, THIRD TRIMESTER: Primary | ICD-10-CM

## 2018-04-10 PROCEDURE — PNV: Performed by: OBSTETRICS & GYNECOLOGY

## 2018-04-10 NOTE — ASSESSMENT & PLAN NOTE
On LDASA  Doing well  Has growth scan scheduled in 6 weeks  Baby is moving well  No pain or other concerns  No bleeding  28 week labs within normal limits

## 2018-04-10 NOTE — PROGRESS NOTES
Problem List Items Addressed This Visit        Other    History of  delivery, currently pregnant    Pregnancy resulting from in vitro fertilization in third trimester    Hx of preeclampsia, prior pregnancy, currently pregnant, third trimester - Primary     On LDASA  Doing well  Has growth scan scheduled in 6 weeks  Baby is moving well  No pain or other concerns  No bleeding  28 week labs within normal limits

## 2018-04-24 ENCOUNTER — ROUTINE PRENATAL (OUTPATIENT)
Dept: OBGYN CLINIC | Facility: CLINIC | Age: 33
End: 2018-04-24
Payer: COMMERCIAL

## 2018-04-24 VITALS — SYSTOLIC BLOOD PRESSURE: 122 MMHG | WEIGHT: 173 LBS | DIASTOLIC BLOOD PRESSURE: 66 MMHG | BODY MASS INDEX: 27.1 KG/M2

## 2018-04-24 DIAGNOSIS — Z23 NEED FOR TDAP VACCINATION: Primary | ICD-10-CM

## 2018-04-24 DIAGNOSIS — O09.813 PREGNANCY RESULTING FROM IN VITRO FERTILIZATION IN THIRD TRIMESTER: ICD-10-CM

## 2018-04-24 PROCEDURE — PNV: Performed by: OBSTETRICS & GYNECOLOGY

## 2018-04-24 PROCEDURE — 90715 TDAP VACCINE 7 YRS/> IM: CPT

## 2018-04-24 PROCEDURE — 90471 IMMUNIZATION ADMIN: CPT

## 2018-04-24 NOTE — PROGRESS NOTES
Tdap today  Check-in card given  Declines breast pump slip  Patient was very nervous about BP today as this is when she was admitted with her first   No significant edema this pregnancy which she had already had for weeks with her last   Denies HA, vision changes, RUQ/epigastric pain, nausea/vomitting  Denies contractions  Reassurance and strict precautions given  Still unsure about TOLAC give failed induction last time - will consider closer to term pending cervical checks

## 2018-05-08 ENCOUNTER — ROUTINE PRENATAL (OUTPATIENT)
Dept: OBGYN CLINIC | Facility: CLINIC | Age: 33
End: 2018-05-08

## 2018-05-08 VITALS — BODY MASS INDEX: 28.19 KG/M2 | WEIGHT: 180 LBS | DIASTOLIC BLOOD PRESSURE: 72 MMHG | SYSTOLIC BLOOD PRESSURE: 128 MMHG

## 2018-05-08 DIAGNOSIS — O09.299 HISTORY OF PRE-ECLAMPSIA IN PRIOR PREGNANCY, CURRENTLY PREGNANT: Primary | ICD-10-CM

## 2018-05-08 PROCEDURE — PNV: Performed by: PHYSICIAN ASSISTANT

## 2018-05-08 NOTE — PROGRESS NOTES
Problem List Items Addressed This Visit     History of pre-eclampsia in prior pregnancy, currently pregnant - Primary     Feeling well overall  Tired  Some swelling this AM in her toes and ankles - worried that this is the start of pre-eclampsia again  Advised with the warmer weather and at 33 weeks I would expect trace edema like she has now, but will keep a close eye on it      BP is well controlled  Will stop aspirin next week  Good fetal movement, it's a boy - no name yet

## 2018-05-08 NOTE — ASSESSMENT & PLAN NOTE
Feeling well overall  Tired  Some swelling this AM in her toes and ankles - worried that this is the start of pre-eclampsia again  Advised with the warmer weather and at 33 weeks I would expect trace edema like she has now, but will keep a close eye on it      BP is well controlled  Will stop aspirin next week  Good fetal movement, it's a boy - no name yet

## 2018-05-18 ENCOUNTER — ULTRASOUND (OUTPATIENT)
Dept: PERINATAL CARE | Facility: CLINIC | Age: 33
End: 2018-05-18
Payer: COMMERCIAL

## 2018-05-18 VITALS
BODY MASS INDEX: 28.38 KG/M2 | HEART RATE: 114 BPM | HEIGHT: 67 IN | SYSTOLIC BLOOD PRESSURE: 134 MMHG | WEIGHT: 180.8 LBS | DIASTOLIC BLOOD PRESSURE: 74 MMHG

## 2018-05-18 DIAGNOSIS — Z3A.34 34 WEEKS GESTATION OF PREGNANCY: ICD-10-CM

## 2018-05-18 DIAGNOSIS — O09.293 HX OF PREECLAMPSIA, PRIOR PREGNANCY, CURRENTLY PREGNANT, THIRD TRIMESTER: ICD-10-CM

## 2018-05-18 DIAGNOSIS — O09.813 PREGNANCY RESULTING FROM IN VITRO FERTILIZATION IN THIRD TRIMESTER: Primary | ICD-10-CM

## 2018-05-18 PROCEDURE — 76816 OB US FOLLOW-UP PER FETUS: CPT | Performed by: OBSTETRICS & GYNECOLOGY

## 2018-05-18 PROCEDURE — 99212 OFFICE O/P EST SF 10 MIN: CPT | Performed by: OBSTETRICS & GYNECOLOGY

## 2018-05-18 NOTE — LETTER
May 18, 2018     Marizol Sun, [address] [facility] [address]    Patient: Ayesha Milton   YOB: 1985   Date of Visit: 5/18/2018       Dear Dr Nolan Coyle: Thank you for referring Ayesha Milton to me for evaluation  Below are my notes for this consultation  If you have questions, please do not hesitate to call me  I look forward to following your patient along with you  Sincerely,        Granville Saint, MD        CC: No Recipients  Granville Saint, MD  5/18/2018  7:19 PM  Sign at close encounter  Please refer to the [facility] ultrasound report in Ob Procedures for additional information regarding the visit to the [facility]  today

## 2018-05-18 NOTE — PROGRESS NOTES
Please refer to the [facility] ultrasound report in Ob Procedures for additional information regarding the visit to the [facility]  today

## 2018-05-24 ENCOUNTER — ROUTINE PRENATAL (OUTPATIENT)
Dept: OBGYN CLINIC | Facility: CLINIC | Age: 33
End: 2018-05-24

## 2018-05-24 VITALS — DIASTOLIC BLOOD PRESSURE: 66 MMHG | SYSTOLIC BLOOD PRESSURE: 102 MMHG | BODY MASS INDEX: 28.79 KG/M2 | WEIGHT: 183.8 LBS

## 2018-05-24 DIAGNOSIS — O34.219 HISTORY OF CESAREAN DELIVERY, CURRENTLY PREGNANT: ICD-10-CM

## 2018-05-24 DIAGNOSIS — O09.299 HISTORY OF PRE-ECLAMPSIA IN PRIOR PREGNANCY, CURRENTLY PREGNANT: ICD-10-CM

## 2018-05-24 DIAGNOSIS — Z34.83 SUPERVISION OF NORMAL INTRAUTERINE PREGNANCY IN MULTIGRAVIDA, THIRD TRIMESTER: Primary | ICD-10-CM

## 2018-05-24 PROCEDURE — PNV: Performed by: OBSTETRICS & GYNECOLOGY

## 2018-05-24 NOTE — ASSESSMENT & PLAN NOTE
Has swelling in her lower legs was concerned about preeclampsia  Her blood pressure is normal  Urine is normal  Review of symptoms of preeclampsia  Patient described normal fetal movement

## 2018-05-24 NOTE — PROGRESS NOTES
Problem List Items Addressed This Visit        Other    History of  delivery, currently pregnant     For now patient plans for a TOLAC         History of pre-eclampsia in prior pregnancy, currently pregnant - Primary    Supervision of normal intrauterine pregnancy in multigravida, third trimester     Has swelling in her lower legs was concerned about preeclampsia  Her blood pressure is normal  Urine is normal  Review of symptoms of preeclampsia  Patient described normal fetal movement

## 2018-05-25 ENCOUNTER — HOSPITAL ENCOUNTER (OUTPATIENT)
Facility: HOSPITAL | Age: 33
Discharge: HOME | End: 2018-05-25
Attending: OBSTETRICS & GYNECOLOGY | Admitting: OBSTETRICS & GYNECOLOGY
Payer: COMMERCIAL

## 2018-05-25 VITALS
OXYGEN SATURATION: 98 % | TEMPERATURE: 99.8 F | RESPIRATION RATE: 18 BRPM | HEART RATE: 107 BPM | SYSTOLIC BLOOD PRESSURE: 128 MMHG | DIASTOLIC BLOOD PRESSURE: 60 MMHG

## 2018-05-25 PROCEDURE — 99214 OFFICE O/P EST MOD 30 MIN: CPT

## 2018-05-25 PROCEDURE — G0463 HOSPITAL OUTPT CLINIC VISIT: HCPCS

## 2018-05-25 NOTE — ED NOTES
Linh Salas with OB aware of patients arrival to ED, stated to bring patient up for evaluation         Stormy Oneal RN  05/25/18 [id number]

## 2018-05-25 NOTE — PROGRESS NOTES
L&D Triage Note - OB/GYN  Dedrick Childers 27 y o  female MRN: [id number]  Unit/Bed#: LD Triage  Encounter: [id number]      SUBJECTIVE:  Dedrick Childers 27 y o  [place] at 35w3d complaining on a rash on both left and right upper extremities, patient reports that is "red, hot, and painful "  Patient also states that she has generalized aching, the kind of aching you get before a storm "  No rash is distinctly apparent on either upper extremities, however, there are several small 2 x 2 mm papules on abdomen that are non pruritic per patient  Denies any rashes on palms or soles of feet  Denies right upper quadrant or epigastric pain, no headache, visual changes, or new onset fatigue  Patient states that she has had no recent changes to lotion, detergent, soaps, or other topical creams  No known sick contacts, no other members of household are exhibiting similar symptoms  Her current obstetrical history is significant for in vitro fertilization, prior pregnancy resulting in preeclampsia with severe features and  section at 34 weeks gestation  Contractions: None  Leakage of fluid: None  Vaginal Bleeding: None  Fetal movement: present  OBJECTIVE:  Vitals:    18 0516   BP: 138/65   Pulse: (!) 115   Resp: 18   Temp: 99 8 °F (37 7 °C)   SpO2: 98%     BPs: 120-130s/60-80s    FHT:  140bpm / Moderate 6 - 25 bpm / reactive, no decels  [place]: no contractions      ASSESSMENT:  Dedrick Giles y o   at 35w3d with benign dermatosis of pregnancy-likely heat rash  PLAN:  1  Continue routine prenatal care, reviewed preeclampsia precautions-instructed to call if she starts to experience headache, scotoma, right upper quadrant/epigastric pain, or new onset swelling/fatigue  Directed to call if she starts to develop new onset worsening rash on abdomen or palms/soles    2  Discharge from [facility] triage, recommended topical hydrocortisone 1% cream on rash sites, aloe vera, Benadryl p o  50 mg once-avoid driving for 4 hours after administration  3   Case discussed with Dr Crystal Morley MD  OB/GYN PGY-2  5/25/2018 5:59 AM

## 2018-05-30 ENCOUNTER — ROUTINE PRENATAL (OUTPATIENT)
Dept: OBGYN CLINIC | Facility: CLINIC | Age: 33
End: 2018-05-30

## 2018-05-30 VITALS — WEIGHT: 182.8 LBS | BODY MASS INDEX: 28.63 KG/M2 | DIASTOLIC BLOOD PRESSURE: 74 MMHG | SYSTOLIC BLOOD PRESSURE: 112 MMHG

## 2018-05-30 DIAGNOSIS — Z3A.36 36 WEEKS GESTATION OF PREGNANCY: Primary | ICD-10-CM

## 2018-05-30 DIAGNOSIS — O09.293 HX OF PREECLAMPSIA, PRIOR PREGNANCY, CURRENTLY PREGNANT, THIRD TRIMESTER: ICD-10-CM

## 2018-05-30 PROCEDURE — 87653 STREP B DNA AMP PROBE: CPT | Performed by: OBSTETRICS & GYNECOLOGY

## 2018-05-30 PROCEDURE — PNV: Performed by: OBSTETRICS & GYNECOLOGY

## 2018-05-30 NOTE — ASSESSMENT & PLAN NOTE
Continues with LE edema  Has pelvic pressure  No evidence of DVT on exam today  Had triage visit due to bug bite on R hand   resolved  Advised to stop LDASA now      Desires TOLAC

## 2018-05-30 NOTE — PROGRESS NOTES
Problem List Items Addressed This Visit        Other    Hx of preeclampsia, prior pregnancy, currently pregnant, third trimester     Continues with LE edema  Has pelvic pressure  No evidence of DVT on exam today  Had triage visit due to bug bite on R hand   resolved  Advised to stop LDASA now      Desires TOLAC           Other Visit Diagnoses     36 weeks gestation of pregnancy    -  Primary    Relevant Orders    Strep B DNA probe, amplification

## 2018-06-01 LAB — GP B STREP DNA SPEC QL NAA+PROBE: NORMAL

## 2018-06-08 ENCOUNTER — ROUTINE PRENATAL (OUTPATIENT)
Dept: OBGYN CLINIC | Facility: CLINIC | Age: 33
End: 2018-06-08

## 2018-06-08 VITALS
HEIGHT: 67 IN | DIASTOLIC BLOOD PRESSURE: 70 MMHG | BODY MASS INDEX: 29.35 KG/M2 | RESPIRATION RATE: 14 BRPM | SYSTOLIC BLOOD PRESSURE: 124 MMHG | WEIGHT: 187 LBS

## 2018-06-08 DIAGNOSIS — Z3A.37 37 WEEKS GESTATION OF PREGNANCY: Primary | ICD-10-CM

## 2018-06-08 PROCEDURE — PNV: Performed by: OBSTETRICS & GYNECOLOGY

## 2018-06-08 NOTE — PROGRESS NOTES
Patient is a 23-year-old female para 1 at 37 weeks and 3 days here for routine prenatal visit without complaint  Review of systems is positive for fetal movement negative for loss of fluid vaginal bleeding or regular contractions  Patient works in [place] [address] and it can take her an hour and a half to get to the hospital at [facility] in [place] from work  We will take her off work at 37 weeks,  that will make Monday her last day of work  Repeat  scheduled for 2018 at 10:00 a m  with Dr Yuriy Morales  If patient goes into labor prior to her  she will consider TOLAC

## 2018-06-14 ENCOUNTER — ROUTINE PRENATAL (OUTPATIENT)
Dept: OBGYN CLINIC | Facility: CLINIC | Age: 33
End: 2018-06-14

## 2018-06-14 VITALS — BODY MASS INDEX: 29.44 KG/M2 | WEIGHT: 188 LBS | SYSTOLIC BLOOD PRESSURE: 120 MMHG | DIASTOLIC BLOOD PRESSURE: 62 MMHG

## 2018-06-14 DIAGNOSIS — Z34.83 SUPERVISION OF NORMAL INTRAUTERINE PREGNANCY IN MULTIGRAVIDA, THIRD TRIMESTER: Primary | ICD-10-CM

## 2018-06-14 PROCEDURE — PNV: Performed by: PHYSICIAN ASSISTANT

## 2018-06-14 NOTE — ASSESSMENT & PLAN NOTE
Feels well overall  Good fetal movement  No ctx, LOF, bleeding  GBS neg  Plan for repeat   but will likely TOLAC if she goes into labor first

## 2018-06-21 ENCOUNTER — ROUTINE PRENATAL (OUTPATIENT)
Dept: OBGYN CLINIC | Facility: CLINIC | Age: 33
End: 2018-06-21

## 2018-06-21 VITALS — SYSTOLIC BLOOD PRESSURE: 104 MMHG | BODY MASS INDEX: 29.98 KG/M2 | DIASTOLIC BLOOD PRESSURE: 62 MMHG | WEIGHT: 191.4 LBS

## 2018-06-21 DIAGNOSIS — O34.219 HISTORY OF CESAREAN DELIVERY, CURRENTLY PREGNANT: ICD-10-CM

## 2018-06-21 DIAGNOSIS — Z34.83 SUPERVISION OF NORMAL INTRAUTERINE PREGNANCY IN MULTIGRAVIDA, THIRD TRIMESTER: Primary | ICD-10-CM

## 2018-06-21 PROCEDURE — PNV: Performed by: OBSTETRICS & GYNECOLOGY

## 2018-06-21 NOTE — PROGRESS NOTES
Problem List Items Addressed This Visit        Other    History of  delivery, currently pregnant - Primary     Ready for  at  at 10 o'clock  Hibiclens bottle was given         Supervision of normal intrauterine pregnancy in multigravida, third trimester     Patient feels well she is not having any contractions she has normal fetal movements she is ready for  next week

## 2018-06-21 NOTE — ASSESSMENT & PLAN NOTE
Patient feels well she is not having any contractions she has normal fetal movements she is ready for  next week

## 2018-06-25 ENCOUNTER — ANESTHESIA EVENT (INPATIENT)
Dept: LABOR AND DELIVERY | Facility: HOSPITAL | Age: 33
End: 2018-06-25
Payer: COMMERCIAL

## 2018-06-26 ENCOUNTER — HOSPITAL ENCOUNTER (INPATIENT)
Facility: HOSPITAL | Age: 33
LOS: 3 days | Discharge: HOME | End: 2018-06-29
Attending: OBSTETRICS & GYNECOLOGY | Admitting: OBSTETRICS & GYNECOLOGY
Payer: COMMERCIAL

## 2018-06-26 ENCOUNTER — ANESTHESIA (INPATIENT)
Dept: LABOR AND DELIVERY | Facility: HOSPITAL | Age: 33
End: 2018-06-26
Payer: COMMERCIAL

## 2018-06-26 DIAGNOSIS — Z98.891 S/P REPEAT LOW TRANSVERSE C-SECTION: Primary | ICD-10-CM

## 2018-06-26 DIAGNOSIS — O34.219 PREVIOUS CESAREAN SECTION COMPLICATING PREGNANCY: ICD-10-CM

## 2018-06-26 LAB
ABO GROUP BLD: NORMAL
BASE EXCESS BLDCOA CALC-SCNC: 1.8 MMOL/L (ref 3–11)
BASE EXCESS BLDCOV CALC-SCNC: 0.6 MMOL/L (ref 1–9)
BLD GP AB SCN SERPL QL: NEGATIVE
ERYTHROCYTE [DISTWIDTH] IN BLOOD BY AUTOMATED COUNT: 14.3 % (ref 11.6–15.1)
HCO3 BLDCOA-SCNC: 29.3 MMOL/L (ref 17.3–27.3)
HCO3 BLDCOV-SCNC: 26.7 MMOL/L (ref 12.2–28.6)
HCT VFR BLD AUTO: 34.8 % (ref 34.8–46.1)
HGB BLD-MCNC: 11.3 G/DL (ref 11.5–15.4)
MCH RBC QN AUTO: 30.2 PG (ref 26.8–34.3)
MCHC RBC AUTO-ENTMCNC: 32.5 G/DL (ref 31.4–37.4)
MCV RBC AUTO: 93 FL (ref 82–98)
O2 CT VFR BLDCOA CALC: 6.3 ML/DL
OXYHGB MFR BLDCOA: 30.6 %
OXYHGB MFR BLDCOV: 62.5 %
PCO2 BLDCOA: 58 MM[HG] (ref 30–60)
PCO2 BLDCOV: 48.5 MM HG (ref 27–43)
PH BLDCOA: 7.32 [PH] (ref 7.23–7.43)
PH BLDCOV: 7.36 [PH] (ref 7.19–7.49)
PLATELET # BLD AUTO: 170 THOUSANDS/UL (ref 149–390)
PMV BLD AUTO: 11.8 FL (ref 8.9–12.7)
PO2 BLDCOA: 16.7 MM HG (ref 5–25)
PO2 BLDCOV: 26.8 MM HG (ref 15–45)
RBC # BLD AUTO: 3.74 MILLION/UL (ref 3.81–5.12)
RH BLD: POSITIVE
SAO2 % BLDCOV: 13 ML/DL
SPECIMEN EXPIRATION DATE: NORMAL
WBC # BLD AUTO: 11.45 THOUSAND/UL (ref 4.31–10.16)

## 2018-06-26 PROCEDURE — 82805 BLOOD GASES W/O2 SATURATION: CPT | Performed by: OBSTETRICS & GYNECOLOGY

## 2018-06-26 PROCEDURE — 59510 CESAREAN DELIVERY: CPT | Performed by: OBSTETRICS & GYNECOLOGY

## 2018-06-26 PROCEDURE — 85027 COMPLETE CBC AUTOMATED: CPT | Performed by: OBSTETRICS & GYNECOLOGY

## 2018-06-26 PROCEDURE — 86850 RBC ANTIBODY SCREEN: CPT | Performed by: OBSTETRICS & GYNECOLOGY

## 2018-06-26 PROCEDURE — 86900 BLOOD TYPING SEROLOGIC ABO: CPT | Performed by: OBSTETRICS & GYNECOLOGY

## 2018-06-26 PROCEDURE — 86592 SYPHILIS TEST NON-TREP QUAL: CPT | Performed by: OBSTETRICS & GYNECOLOGY

## 2018-06-26 PROCEDURE — 86901 BLOOD TYPING SEROLOGIC RH(D): CPT | Performed by: OBSTETRICS & GYNECOLOGY

## 2018-06-26 RX ORDER — OXYTOCIN/RINGER'S LACTATE 30/500 ML
PLASTIC BAG, INJECTION (ML) INTRAVENOUS AS NEEDED
Status: DISCONTINUED | OUTPATIENT
Start: 2018-06-26 | End: 2018-06-26 | Stop reason: SURG

## 2018-06-26 RX ORDER — MELATONIN
1000 DAILY
Status: DISCONTINUED | OUTPATIENT
Start: 2018-06-26 | End: 2018-06-29 | Stop reason: HOSPADM

## 2018-06-26 RX ORDER — NALOXONE HYDROCHLORIDE 0.4 MG/ML
0.1 INJECTION, SOLUTION INTRAMUSCULAR; INTRAVENOUS; SUBCUTANEOUS
Status: ACTIVE | OUTPATIENT
Start: 2018-06-26 | End: 2018-06-27

## 2018-06-26 RX ORDER — MORPHINE SULFATE 0.5 MG/ML
INJECTION, SOLUTION EPIDURAL; INTRATHECAL; INTRAVENOUS AS NEEDED
Status: DISCONTINUED | OUTPATIENT
Start: 2018-06-26 | End: 2018-06-26 | Stop reason: SURG

## 2018-06-26 RX ORDER — MAGNESIUM HYDROXIDE/ALUMINUM HYDROXICE/SIMETHICONE 120; 1200; 1200 MG/30ML; MG/30ML; MG/30ML
15 SUSPENSION ORAL EVERY 6 HOURS PRN
Status: DISCONTINUED | OUTPATIENT
Start: 2018-06-26 | End: 2018-06-29 | Stop reason: HOSPADM

## 2018-06-26 RX ORDER — ONDANSETRON 2 MG/ML
4 INJECTION INTRAMUSCULAR; INTRAVENOUS EVERY 4 HOURS PRN
Status: ACTIVE | OUTPATIENT
Start: 2018-06-26 | End: 2018-06-27

## 2018-06-26 RX ORDER — TRISODIUM CITRATE DIHYDRATE AND CITRIC ACID MONOHYDRATE 500; 334 MG/5ML; MG/5ML
30 SOLUTION ORAL 4 TIMES DAILY PRN
Status: DISCONTINUED | OUTPATIENT
Start: 2018-06-26 | End: 2018-06-29 | Stop reason: HOSPADM

## 2018-06-26 RX ORDER — BISACODYL 10 MG
10 SUPPOSITORY, RECTAL RECTAL DAILY PRN
Status: DISCONTINUED | OUTPATIENT
Start: 2018-06-26 | End: 2018-06-29 | Stop reason: HOSPADM

## 2018-06-26 RX ORDER — BUPIVACAINE HYDROCHLORIDE 7.5 MG/ML
INJECTION, SOLUTION INTRASPINAL AS NEEDED
Status: DISCONTINUED | OUTPATIENT
Start: 2018-06-26 | End: 2018-06-26 | Stop reason: SURG

## 2018-06-26 RX ORDER — FERROUS SULFATE 325(65) MG
325 TABLET ORAL
Status: DISCONTINUED | OUTPATIENT
Start: 2018-06-27 | End: 2018-06-29 | Stop reason: HOSPADM

## 2018-06-26 RX ORDER — OXYTOCIN/RINGER'S LACTATE 30/500 ML
62.5 PLASTIC BAG, INJECTION (ML) INTRAVENOUS ONCE
Status: COMPLETED | OUTPATIENT
Start: 2018-06-26 | End: 2018-06-26

## 2018-06-26 RX ORDER — SENNOSIDES 8.6 MG
1 TABLET ORAL
Status: DISCONTINUED | OUTPATIENT
Start: 2018-06-26 | End: 2018-06-29 | Stop reason: HOSPADM

## 2018-06-26 RX ORDER — DIPHENHYDRAMINE HCL 25 MG
25 TABLET ORAL EVERY 6 HOURS PRN
Status: DISCONTINUED | OUTPATIENT
Start: 2018-06-27 | End: 2018-06-29 | Stop reason: HOSPADM

## 2018-06-26 RX ORDER — ONDANSETRON 2 MG/ML
INJECTION INTRAMUSCULAR; INTRAVENOUS AS NEEDED
Status: DISCONTINUED | OUTPATIENT
Start: 2018-06-26 | End: 2018-06-26 | Stop reason: SURG

## 2018-06-26 RX ORDER — DOCUSATE SODIUM 100 MG/1
100 CAPSULE, LIQUID FILLED ORAL 2 TIMES DAILY PRN
Status: DISCONTINUED | OUTPATIENT
Start: 2018-06-26 | End: 2018-06-29 | Stop reason: HOSPADM

## 2018-06-26 RX ORDER — NALBUPHINE HCL 10 MG/ML
5 AMPUL (ML) INJECTION
Status: DISPENSED | OUTPATIENT
Start: 2018-06-26 | End: 2018-06-27

## 2018-06-26 RX ORDER — ONDANSETRON 2 MG/ML
4 INJECTION INTRAMUSCULAR; INTRAVENOUS ONCE AS NEEDED
Status: DISCONTINUED | OUTPATIENT
Start: 2018-06-26 | End: 2018-06-26

## 2018-06-26 RX ORDER — IBUPROFEN 600 MG/1
600 TABLET ORAL EVERY 6 HOURS PRN
Status: DISCONTINUED | OUTPATIENT
Start: 2018-06-20 | End: 2018-06-29 | Stop reason: HOSPADM

## 2018-06-26 RX ORDER — METOCLOPRAMIDE HYDROCHLORIDE 5 MG/ML
10 INJECTION INTRAMUSCULAR; INTRAVENOUS ONCE AS NEEDED
Status: DISCONTINUED | OUTPATIENT
Start: 2018-06-26 | End: 2018-06-26

## 2018-06-26 RX ORDER — DIPHENHYDRAMINE HYDROCHLORIDE 50 MG/ML
25 INJECTION INTRAMUSCULAR; INTRAVENOUS EVERY 6 HOURS PRN
Status: ACTIVE | OUTPATIENT
Start: 2018-06-26 | End: 2018-06-27

## 2018-06-26 RX ORDER — METOCLOPRAMIDE HYDROCHLORIDE 5 MG/ML
5 INJECTION INTRAMUSCULAR; INTRAVENOUS EVERY 6 HOURS PRN
Status: ACTIVE | OUTPATIENT
Start: 2018-06-26 | End: 2018-06-27

## 2018-06-26 RX ORDER — DIAPER,BRIEF,INFANT-TODD,DISP
1 EACH MISCELLANEOUS DAILY PRN
Status: DISCONTINUED | OUTPATIENT
Start: 2018-06-26 | End: 2018-06-29 | Stop reason: HOSPADM

## 2018-06-26 RX ORDER — DEXAMETHASONE SODIUM PHOSPHATE 4 MG/ML
INJECTION, SOLUTION INTRA-ARTICULAR; INTRALESIONAL; INTRAMUSCULAR; INTRAVENOUS; SOFT TISSUE AS NEEDED
Status: DISCONTINUED | OUTPATIENT
Start: 2018-06-26 | End: 2018-06-26 | Stop reason: SURG

## 2018-06-26 RX ORDER — SODIUM CHLORIDE, SODIUM LACTATE, POTASSIUM CHLORIDE, CALCIUM CHLORIDE 600; 310; 30; 20 MG/100ML; MG/100ML; MG/100ML; MG/100ML
125 INJECTION, SOLUTION INTRAVENOUS CONTINUOUS
Status: DISCONTINUED | OUTPATIENT
Start: 2018-06-26 | End: 2018-06-26

## 2018-06-26 RX ORDER — ACETAMINOPHEN 325 MG/1
650 TABLET ORAL EVERY 6 HOURS PRN
Status: DISCONTINUED | OUTPATIENT
Start: 2018-06-27 | End: 2018-06-29 | Stop reason: HOSPADM

## 2018-06-26 RX ORDER — KETOROLAC TROMETHAMINE 30 MG/ML
30 INJECTION, SOLUTION INTRAMUSCULAR; INTRAVENOUS EVERY 6 HOURS PRN
Status: DISPENSED | OUTPATIENT
Start: 2018-06-26 | End: 2018-06-27

## 2018-06-26 RX ORDER — SODIUM CHLORIDE, SODIUM LACTATE, POTASSIUM CHLORIDE, CALCIUM CHLORIDE 600; 310; 30; 20 MG/100ML; MG/100ML; MG/100ML; MG/100ML
125 INJECTION, SOLUTION INTRAVENOUS CONTINUOUS
Status: DISCONTINUED | OUTPATIENT
Start: 2018-06-26 | End: 2018-06-29 | Stop reason: HOSPADM

## 2018-06-26 RX ORDER — CALCIUM CARBONATE 200(500)MG
1000 TABLET,CHEWABLE ORAL DAILY PRN
Status: DISCONTINUED | OUTPATIENT
Start: 2018-06-26 | End: 2018-06-29 | Stop reason: HOSPADM

## 2018-06-26 RX ORDER — LANOLIN ALCOHOL/MO/W.PET/CERES
400 CREAM (GRAM) TOPICAL DAILY
Status: DISCONTINUED | OUTPATIENT
Start: 2018-06-26 | End: 2018-06-29 | Stop reason: HOSPADM

## 2018-06-26 RX ORDER — SIMETHICONE 80 MG
80 TABLET,CHEWABLE ORAL 4 TIMES DAILY PRN
Status: DISCONTINUED | OUTPATIENT
Start: 2018-06-26 | End: 2018-06-29 | Stop reason: HOSPADM

## 2018-06-26 RX ORDER — DEXAMETHASONE SODIUM PHOSPHATE 4 MG/ML
8 INJECTION, SOLUTION INTRA-ARTICULAR; INTRALESIONAL; INTRAMUSCULAR; INTRAVENOUS; SOFT TISSUE ONCE AS NEEDED
Status: ACTIVE | OUTPATIENT
Start: 2018-06-26 | End: 2018-06-27

## 2018-06-26 RX ORDER — ONDANSETRON 2 MG/ML
4 INJECTION INTRAMUSCULAR; INTRAVENOUS EVERY 8 HOURS PRN
Status: DISCONTINUED | OUTPATIENT
Start: 2018-06-27 | End: 2018-06-29 | Stop reason: HOSPADM

## 2018-06-26 RX ADMIN — KETOROLAC TROMETHAMINE 30 MG: 30 INJECTION, SOLUTION INTRAMUSCULAR at 16:01

## 2018-06-26 RX ADMIN — DEXAMETHASONE SODIUM PHOSPHATE 4 MG: 4 INJECTION, SOLUTION INTRAMUSCULAR; INTRAVENOUS at 08:08

## 2018-06-26 RX ADMIN — PHENYLEPHRINE HYDROCHLORIDE 50 MCG/MIN: 10 INJECTION INTRAVENOUS at 08:09

## 2018-06-26 RX ADMIN — KETOROLAC TROMETHAMINE 30 MG: 30 INJECTION, SOLUTION INTRAMUSCULAR at 10:05

## 2018-06-26 RX ADMIN — SODIUM CHLORIDE, SODIUM LACTATE, POTASSIUM CHLORIDE, AND CALCIUM CHLORIDE 125 ML/HR: .6; .31; .03; .02 INJECTION, SOLUTION INTRAVENOUS at 18:50

## 2018-06-26 RX ADMIN — SODIUM CHLORIDE, SODIUM LACTATE, POTASSIUM CHLORIDE, AND CALCIUM CHLORIDE 125 ML/HR: .6; .31; .03; .02 INJECTION, SOLUTION INTRAVENOUS at 11:20

## 2018-06-26 RX ADMIN — SODIUM CHLORIDE, SODIUM LACTATE, POTASSIUM CHLORIDE, AND CALCIUM CHLORIDE: .6; .31; .03; .02 INJECTION, SOLUTION INTRAVENOUS at 07:58

## 2018-06-26 RX ADMIN — Medication 30 MG: at 15:57

## 2018-06-26 RX ADMIN — Medication 400 MCG: at 15:57

## 2018-06-26 RX ADMIN — NALBUPHINE HYDROCHLORIDE 5 MG: 10 INJECTION, SOLUTION INTRAMUSCULAR; INTRAVENOUS; SUBCUTANEOUS at 15:58

## 2018-06-26 RX ADMIN — NALBUPHINE HYDROCHLORIDE 5 MG: 10 INJECTION, SOLUTION INTRAMUSCULAR; INTRAVENOUS; SUBCUTANEOUS at 20:21

## 2018-06-26 RX ADMIN — Medication 62.5 MILLI-UNITS/MIN: at 10:40

## 2018-06-26 RX ADMIN — ONDANSETRON 4 MG: 2 INJECTION INTRAMUSCULAR; INTRAVENOUS at 08:08

## 2018-06-26 RX ADMIN — SODIUM CHLORIDE, SODIUM LACTATE, POTASSIUM CHLORIDE, AND CALCIUM CHLORIDE 1000 ML: .6; .31; .03; .02 INJECTION, SOLUTION INTRAVENOUS at 06:32

## 2018-06-26 RX ADMIN — CHOLECALCIFEROL TAB 25 MCG (1000 UNIT) 1000 UNITS: 25 TAB at 15:57

## 2018-06-26 RX ADMIN — MORPHINE SULFATE 0.25 MG: 0.5 INJECTION, SOLUTION EPIDURAL; INTRATHECAL; INTRAVENOUS at 08:05

## 2018-06-26 RX ADMIN — SODIUM CHLORIDE, SODIUM LACTATE, POTASSIUM CHLORIDE, AND CALCIUM CHLORIDE 125 ML/HR: .6; .31; .03; .02 INJECTION, SOLUTION INTRAVENOUS at 07:06

## 2018-06-26 RX ADMIN — CEFAZOLIN SODIUM 2000 MG: 10 INJECTION, POWDER, FOR SOLUTION INTRAVENOUS at 08:08

## 2018-06-26 RX ADMIN — SODIUM CHLORIDE, SODIUM LACTATE, POTASSIUM CHLORIDE, AND CALCIUM CHLORIDE: .6; .31; .03; .02 INJECTION, SOLUTION INTRAVENOUS at 08:53

## 2018-06-26 RX ADMIN — BUPIVACAINE HYDROCHLORIDE IN DEXTROSE 1.6 ML: 7.5 INJECTION, SOLUTION SUBARACHNOID at 08:05

## 2018-06-26 RX ADMIN — Medication 30 UNITS: at 08:31

## 2018-06-26 NOTE — ANESTHESIA POSTPROCEDURE EVALUATION
Post-Op Assessment Note      CV Status:  Stable    Mental Status:  Alert and awake    Hydration Status:  Euvolemic    PONV Controlled:  Controlled    Airway Patency:  Patent    Post Op Vitals Reviewed: Yes          Staff: CRNA           BP   112/66   Temp  97 2   Pulse  75   Resp   16   SpO2   97%

## 2018-06-26 NOTE — ANESTHESIA PROCEDURE NOTES
Spinal Block    Patient location during procedure: OR  Start time: 6/26/2018 8:05 AM  Reason for block: procedure for pain and at surgeon's request  Staffing  Anesthesiologist: Natale Phoenix  Performed: anesthesiologist   Preanesthetic Checklist  Completed: patient identified, site marked, surgical consent, pre-op evaluation, timeout performed, IV checked, risks and benefits discussed and monitors and equipment checked  Spinal Block  Patient position: sitting  Prep: ChloraPrep  Patient monitoring: cardiac monitor and frequent blood pressure checks  Approach: midline  Location: L3-4  Injection technique: single-shot  Needle  Needle type: pencil-tip   Needle gauge: 25 G  Needle length: 10 cm  Assessment  Sensory level: T4  Injection Assessment:  negative aspiration for heme, no paresthesia on injection and positive aspiration for clear CSF    Post-procedure:  adhesive bandage applied, pressure dressing applied, secured with tape, site cleaned and sterile dressing applied  Additional Notes  One attempt, easy

## 2018-06-26 NOTE — DISCHARGE SUMMARY
CS Discharge Summary - Long Herr 27 y o  female MRN: [id number]    Unit/Bed#: LD OR 2-01 Encounter: [id number]    Admission Date: 2018     Discharge Date: 2018    Admitting Diagnosis: 36 week gestation, prior cs, prior L oophrectomy, IVF pregnancy, hx of preE w/ SF in prior pregnancy    Discharge Diagnosis: same    Procedures: repeat  section, low transverse incision    Attending: Joan Evans MD   Discharge Attending: Dr Glynn Bolaños Course:     Long Herr is a 27 y o  Ferna Lips who was admitted at 40 wks for scheduled repeat CS  She underwent an uncomplicated  section   was transferred to  nursery  Patient tolerated the procedure well and was transferred to recovery in stable condition  Her post-operative course was uncomplicated  Preoperative hemaglobin was 11 3, postoperative was 10 7  Her postoperative pain was well controlled with oral analgesics  On day of discharge, she was ambulating and able to reasonably perform all ADLs  She was voiding and had appropriate bowel function  Pain was well controlled  She was discharged home on post-operative day #3 without complications  Patient was instructed to follow up with her OB as an outpatient and was given appropriate warnings to call provider if she develops signs of infection or uncontrolled pain  Complications: None    Condition at discharge: good     Discharge instructions/Information to patient and family:   - Do not place anything (no partner, tampons or douche) in your vagina for 6 weeks  -You may walk for exercise for the first 6 weeks then gradually return to your usual activities    -Please do not drive for 1 week if you have no stitches and for 2 weeks if you have stitches or underwent a  delivery     -You may take baths or shower per your preference    -Please look at your bust (breasts) in the mirror daily and call for redness or tenderness or increased warmth  -Please call us for temperature > 100 4*F or 38* C, worsening pain or a foul discharge       Discharge Medications:   Prenatal vitamin daily for 6 months or the duration of nursing whichever is longer  Motrin 600 mg orally every 6 hours as needed for pain  Tylenol (over the counter) per bottle directions as needed for pain: do NOT use with percocet  Hydrocortisone cream 1% (over the counter) applied 1-2x daily to hemorrhoids as needed    Provisions for Follow-Up Care: Follow up with your doctor in 1 week for incision check       Planned Readmission: Tana Villela MD  6/29/2018  6:41 AM

## 2018-06-26 NOTE — DISCHARGE INSTRUCTIONS
Section   WHAT YOU SHOULD KNOW:   A  delivery, or , is abdominal surgery to deliver your baby  There are many reasons you may need a   · A  may be scheduled before labor if you had a  with your last baby  It may be scheduled if your baby is not positioned normally, or you are pregnant with more than 1 baby  · Your caregiver may perform an emergency  during labor to prevent life-threatening complications for you or your baby  A  may be done if your cervix does not dilate after several hours of active labor  · Other reasons for a  include maternal infections and problems with the placenta  AFTER YOU LEAVE:   Medicines:   · Prescription pain medicine  may be given  Ask how to take this medicine safely  · Acetaminophen  decreases pain and fever  It is available without a doctor's order  Ask how much to take and how often to take it  Follow directions  Acetaminophen can cause liver damage if not taken correctly  · NSAIDs  help decrease swelling and pain or fever  This medicine is available with or without a doctor's order  NSAIDs can cause stomach bleeding or kidney problems in certain people  If you take blood thinner medicine, always ask your obstetrician if NSAIDs are safe for you  Always read the medicine label and follow directions  · Take your medicine as directed  Contact your obstetrician (OB) if you think your medicine is not helping or if you have side effects  Tell him if you are allergic to any medicine  Keep a list of the medicines, vitamins, and herbs you take  Include the amounts, and when and why you take them  Bring the list or the pill bottles to follow-up visits  Carry your medicine list with you in case of an emergency  Follow up with your OB as directed: You may need to return to have your stitches or staples removed   Write down your questions so you remember to ask them during your visits  Wound care:  Carefully wash your wound with soap and water every day  Keep your wound clean and dry  Wear loose, comfortable clothes that do not rub against your wound  Ask your OB about bathing and showering  Drink plenty of liquids: You can lower your risk for a blood clot if you drink plenty of liquids  Ask how much liquid to drink each day and which liquids are best for you  Limit activity until you have fully recovered from surgery:   · Ask when it is safe for you to drive, walk up stairs, lift heavy objects, and have sex  · Ask when it is okay to exercise, and what types of exercise to do  Start slowly and do more as you get stronger  Contact your OB if:   · You have heavy vaginal bleeding that fills 1 or more sanitary pads in 1 hour  · You have a fever  · Your incision is swollen, red, or draining pus  · You have questions or concerns about yourself or your baby  Seek care immediately or call 911 if:   · Blood soaks through your bandage  · Your stitches come apart  · You feel lightheaded, short of breath, and have chest pain  · You cough up blood  · Your arm or leg feels warm, tender, and painful  It may look swollen and red  © 2014 [address] is for End User's use only and may not be sold, redistributed or otherwise used for commercial purposes  All illustrations and images included in CareNotes® are the copyrighted property of A D A M , Inc  or Robe Bolanos  The above information is an  only  It is not intended as medical advice for individual conditions or treatments  Talk to your doctor, nurse or pharmacist before following any medical regimen to see if it is safe and effective for you

## 2018-06-26 NOTE — H&P
H&P Exam - Obstetrics   Chloe Tran 27 y o  female MRN: [id number]  Unit/Bed#:  Encounter: [id number]    Assessment/Plan     Assessment:  28yo P1 @ 40 weeks with prior  x 1  Plan:  Repeat     History of Present Illness   Chief Complaint: Elective     HPI:  Chloe Tran is a 27 y o   female with an VASILE of 2018, by Est  Date of Conception at 40w0d weeks gestation who is being admitted for Elective   Her current obstetrical history is significant for prior   Contractions: None  Leakage of fluid: None  Bleeding: None  Fetal movement: present  Pregnancy complications: IVF pregnancy  Review of Systems    Historical Information   OB History    Para Term  AB Living   2 1   1   1   SAB TAB Ectopic Multiple Live Births           1      # Outcome Date GA Lbr Paulo/2nd Weight Sex Delivery Anes PTL Lv   2 Current            1  04/26/15 34w2d  1843 g (4 lb 1 oz) F CS-LTranv Spinal Y SILVESTRE      Complications: Preeclampsia,Failed induction      Obstetric Comments   Pre-eclampsia hx of , prior  at 34-2 weeks      Baby complications/comments: none  Past Medical History:   Diagnosis Date    Female infertility     IVF pregnacny , IVF     Gestational hypertension, third trimester     resolved:  May 12, 2015    Infertility, female     Migraine     Resolved: Nov 3, 2015    Ovarian cyst     removed , 6lb cyst    Preeclampsia     Seasonal allergies     Type AB blood, Rh positive     Varicella     had as child around 8years old     Visual impairment     eywear      Past Surgical History:   Procedure Laterality Date     SECTION, LOW TRANSVERSE  2015 daughter    MOUTH FLOOR MASS EXCISION  1985    OOPHORECTOMY Left     unilateral - Removal of one ovary     OVARIAN CYST REMOVAL Left      Social History   History   Alcohol Use No     Comment: Social alcohol use noted in "allscripts"      History   Drug Use No     History   Smoking Status    Never Smoker   Smokeless Tobacco    Never Used     Family History: non-contributory    Meds/Allergies   all medications and allergies reviewed  Allergies   Allergen Reactions    Neosporin [Neomycin-Bacitracin Zn-Polymyx] Hives    Pollen Extract Allergic Rhinitis       Objective   Vitals: not currently breastfeeding  There is no height or weight on file to calculate BMI  Invasive Devices          No matching active lines, drains, or airways          Physical Exam    Prenatal Labs: I have personally reviewed pertinent reports  Imaging, EKG, Pathology, and Other Studies: I have personally reviewed pertinent reports

## 2018-06-26 NOTE — OP NOTE
Section Operative Report - OB/GYN   Tania Michelle 27 y o  female MRN: [id number]  Unit/Bed#:  OR  Encounter: [id number]    Indications: prior CS with desired repeat CS    Pre-operative Diagnosis:   1  40 week 0 day pregnancy  2  Prior CS  3  Hx of L oophorectomy  4  Hx of PreE w/ SF in prior pregnancy with delivery at 34w    Post-operative Diagnosis: same, delivered    Surgeon: Yennifer Benitez MD    Assistant(s): Sue Reilly MD    Findings:  1  Delivery of viable male on 18 at 0830, weight 8lbs 8oz;  Apgar scores of 9 at one minute and 9 at five minutes  2  Normal appearing placenta with centrally-inserted 3 vessel cord  3  Clear amniotic fluid  4  Grossly normal uterus, tubes, and right ovary, surgically absent L ovary    Specimens:   1  Arterial and venous cord gases  2  Cord blood  3  Segment of umbilical cord  4  Placenta to storage     Estimated Blood Loss:  500 mL           Total IV Fluids: 2000 mL    Drains: Connors catheter           Complications:  None; patient tolerated the procedure well  Disposition: PACU            Condition: stable    Procedure Details   Decision was made to proceed with  section due to prior CS  Patient was made aware of these findings and the proposed plan  Risks, benefits, possible complications, alternate treatment options, and expected outcomes were discussed with the patient  The patient agreed with the proposed plan and gave informed consent  The patient was taken to the operating room where she was properly identified to the OR staff and attending physician  She received spinal anesthesia preoperatively  Fetal heart tones were appreciated and found to be appropriate  A Connors catheter was aseptically inserted and SCDs were placed  The abdomen was prepped with Chloraprep and following appropriate drying time, the patient was draped in the usual sterile manner for a Pfannenstiel incision    The patient had received Ancef 2g IV pre-operatively for prophylaxis  A Time Out was held and the above information confirmed  The patient was identified as Mikki Jones and the procedure verified as  Delivery  A Pfannenstiel incision was made and carried down through the underlying subcutaneous tissue to the fascia using a scalpel  Rectus fascia was then incised in the midline and extended laterally using Lynn scissors  The superior edge of the fascia was grasped with Kocher clamps, tented upward, and the underlying muscle was bluntly dissected off  The inferior edge was grasped with Kocher clamps and cleared in similar fashion  All anatomic layers were well-demarcated  The rectus muscles were  and the peritoneum was identified and subsequently entered and extended longitudinally with blunt dissection  The vesicouterine peritoneum was identified  Jacob O retractor was placed and deployed per manufacturers instructions  A low transverse uterine incision was made with the scalpel and extended laterally with blunt dissection  The amnion was entered sharply  Surgeons hand was inserted through the hysterotomy and the fetal head was palpated, elevated, and delivered through the uterine incision with the assistance of fundal pressure  Baby had spontaneous cry with good color and tone  The umbilical cord was clamped and cut  The infant was handed off to the  providers  Arterial and venous cord gases, cord blood, and a segment of umbilical cord were obtained for evaluation and promptly sent to the lab  The placenta delivered spontaneously with uterine fundal massage and was noted to have a centrally inserted 3 vessel cord  This was also sent to the lab for placental pathology  The uterus was left in situ and a moist lap sponge was used to clear the cavity of clots and products of conception  The uterine incision was closed with a running locked suture of 0 monocryl   A second layer of the same suture was used to imbricate the first   Good hemostasis was confirmed upon uterine closure  The paracolic gutters were inspected and cleared of all clots and debris with irrigation and moist lap sponges  The [place] Mote was removed  The peritoneum was closed with a running suture of 3-0 plain gut  The fascia was closed with a running suture of 0 Vicryl  Subcutaneous tissues were closed with 2-0 plain gut suture  The skin was closed with 4-0 Vicryl in a subcuticular fashion  Histoacryl was applied and an abdominal binder was then placed  At the conclusion of the procedure, all needle, sponge, and instrument counts were noted to be correct x2  The patient tolerated the procedure well and was transferred to her the recovery room in stable condition  Dr Marcus Duckworth was present and participated in all key portions of the case        Hansa Callahan MD  OB/GYN PGY-2  6/26/2018 9:48 AM

## 2018-06-26 NOTE — ANESTHESIA PREPROCEDURE EVALUATION
Review of Systems/Medical History  Patient summary reviewed  Chart reviewed  No history of anesthetic complications     Cardiovascular  Hypertension ,    Pulmonary  Negative pulmonary ROS        GI/Hepatic            Endo/Other     GYN       Hematology  Anemia ,     Musculoskeletal       Neurology   Psychology           Physical Exam    Airway    Mallampati score: II  TM Distance: >3 FB  Neck ROM: full     Dental       Cardiovascular      Pulmonary      Other Findings        Anesthesia Plan  ASA Score- 2     Anesthesia Type- spinal with ASA Monitors  Additional Monitors:   Airway Plan:         Plan Factors-    Induction-     Postoperative Plan-     Informed Consent- Anesthetic plan and risks discussed with patient  I personally reviewed this patient with the CRNA  Discussed and agreed on the Anesthesia Plan with the CRNA  Amber Lin

## 2018-06-27 LAB
BASOPHILS # BLD AUTO: 0.03 THOUSANDS/ΜL (ref 0–0.1)
BASOPHILS NFR BLD AUTO: 0 % (ref 0–1)
EOSINOPHIL # BLD AUTO: 0.06 THOUSAND/ΜL (ref 0–0.61)
EOSINOPHIL NFR BLD AUTO: 0 % (ref 0–6)
ERYTHROCYTE [DISTWIDTH] IN BLOOD BY AUTOMATED COUNT: 14.3 % (ref 11.6–15.1)
HCT VFR BLD AUTO: 33.1 % (ref 34.8–46.1)
HGB BLD-MCNC: 10.7 G/DL (ref 11.5–15.4)
IMM GRANULOCYTES # BLD AUTO: 0.12 THOUSAND/UL (ref 0–0.2)
IMM GRANULOCYTES NFR BLD AUTO: 1 % (ref 0–2)
LYMPHOCYTES # BLD AUTO: 2.89 THOUSANDS/ΜL (ref 0.6–4.47)
LYMPHOCYTES NFR BLD AUTO: 20 % (ref 14–44)
MCH RBC QN AUTO: 30.5 PG (ref 26.8–34.3)
MCHC RBC AUTO-ENTMCNC: 32.3 G/DL (ref 31.4–37.4)
MCV RBC AUTO: 94 FL (ref 82–98)
MONOCYTES # BLD AUTO: 0.79 THOUSAND/ΜL (ref 0.17–1.22)
MONOCYTES NFR BLD AUTO: 6 % (ref 4–12)
NEUTROPHILS # BLD AUTO: 10.45 THOUSANDS/ΜL (ref 1.85–7.62)
NEUTS SEG NFR BLD AUTO: 73 % (ref 43–75)
NRBC BLD AUTO-RTO: 0 /100 WBCS
PLATELET # BLD AUTO: 177 THOUSANDS/UL (ref 149–390)
PMV BLD AUTO: 11.8 FL (ref 8.9–12.7)
RBC # BLD AUTO: 3.51 MILLION/UL (ref 3.81–5.12)
RPR SER QL: NORMAL
WBC # BLD AUTO: 14.34 THOUSAND/UL (ref 4.31–10.16)

## 2018-06-27 PROCEDURE — 85025 COMPLETE CBC W/AUTO DIFF WBC: CPT | Performed by: OBSTETRICS & GYNECOLOGY

## 2018-06-27 PROCEDURE — 99024 POSTOP FOLLOW-UP VISIT: CPT | Performed by: OBSTETRICS & GYNECOLOGY

## 2018-06-27 RX ORDER — OXYCODONE HYDROCHLORIDE AND ACETAMINOPHEN 5; 325 MG/1; MG/1
1 TABLET ORAL EVERY 4 HOURS PRN
Status: DISCONTINUED | OUTPATIENT
Start: 2018-06-27 | End: 2018-06-29 | Stop reason: HOSPADM

## 2018-06-27 RX ORDER — OXYCODONE HYDROCHLORIDE AND ACETAMINOPHEN 5; 325 MG/1; MG/1
2 TABLET ORAL EVERY 4 HOURS PRN
Status: DISCONTINUED | OUTPATIENT
Start: 2018-06-27 | End: 2018-06-29 | Stop reason: HOSPADM

## 2018-06-27 RX ADMIN — CHOLECALCIFEROL TAB 25 MCG (1000 UNIT) 1000 UNITS: 25 TAB at 09:54

## 2018-06-27 RX ADMIN — Medication 400 MCG: at 09:54

## 2018-06-27 RX ADMIN — IBUPROFEN 600 MG: 600 TABLET ORAL at 23:16

## 2018-06-27 RX ADMIN — Medication 325 MG: at 07:42

## 2018-06-27 RX ADMIN — NALBUPHINE HYDROCHLORIDE 5 MG: 10 INJECTION, SOLUTION INTRAMUSCULAR; INTRAVENOUS; SUBCUTANEOUS at 04:15

## 2018-06-27 RX ADMIN — IBUPROFEN 600 MG: 600 TABLET ORAL at 16:23

## 2018-06-27 RX ADMIN — Medication 30 MG: at 09:54

## 2018-06-27 NOTE — PROGRESS NOTES
Postpartum Progress Note - OB/GYN   Kelsey Null 27 y o  female MRN: [id number]  Unit/Bed#: -01 Encounter: [id number]    ASSESSMENT:  Kelsey Null 27 y o  [id number] s/p Repeat low transverse  section post-operative day 1  Pt is well appearing and with no current complaints  PLAN:  1) Continue routine postpartum care  2) Encourage ambulation  3) Pain control as needed  4) Advance diet as tolerated  5) Dispo: stable and comfortable    Subjective/Objective     SUBJECTIVE:  Pain: no  Tolerating Oral Intake: yes   Voiding: yes  Flatus: yes  Bowel Movement: no  Ambulating: yes  Breastfeeding: yes  Chest Pain: no  Shortness of Breath: no  Leg Pain/Discomfort: no  Lochia: minimal    OBJECTIVE:     Vitals: Blood pressure 119/63, pulse 62, temperature 97 8 °F (36 6 °C), temperature source Oral, resp  rate 18, height 5' 7" (1 702 m), weight 86 2 kg (190 lb), SpO2 100 %, not currently breastfeeding       General: appears well, alert and oriented x 3, pleasant and cooperative  Cardiovascular: RRR, normal S1/S2, no GRM  Lungs:  clear to auscultation bilaterally; no WRR, non-labored breathing   Abdomen: normal bowel sounds, soft, no tenderness, no distention  : Uterine fundus firm: -2 cm below the umbilicus  Incision: Clean, dry, and intact, closed with running absorbable suture, no erythema/fluctueance appreciated  Lower Extremities: Non-tender, peripheral pulses normal; no clubbing, cyanosis, or edema    Labs:   Lab Results   Component Value Date    WBC 14 34 (H) 2018    HGB 10 7 (L) 2018    HCT 33 1 (L) 2018    MCV 94 2018     2018       Medications:   Current Facility-Administered Medications   Medication Dose Route Frequency    acetaminophen (TYLENOL) tablet 650 mg  650 mg Oral Q6H PRN    aluminum-magnesium hydroxide-simethicone (MYLANTA) 200-200-20 mg/5 mL oral suspension 15 mL  15 mL Oral Q6H PRN    benzocaine-menthol-lanolin-aloe (DERMOPLAST) 20-0 5 % topical spray 1 application  1 application Topical 4x Daily PRN    bisacodyl (DULCOLAX) rectal suppository 10 mg  10 mg Rectal Daily PRN    calcium carbonate (TUMS) chewable tablet 1,000 mg  1,000 mg Oral Daily PRN    cholecalciferol (VITAMIN D3) tablet 1,000 Units  1,000 Units Oral Daily    co-enzyme Q-10 capsule   Oral Daily    dexamethasone (DECADRON) injection 8 mg  8 mg Intravenous Once PRN    diphenhydrAMINE (BENADRYL) injection 25 mg  25 mg Intravenous Q6H PRN    diphenhydrAMINE (BENADRYL) tablet 25 mg  25 mg Oral Q6H PRN    docusate sodium (COLACE) capsule 100 mg  100 mg Oral BID PRN    ferrous sulfate tablet 325 mg  325 mg Oral Daily With Breakfast    folic acid (FOLVITE) tablet 400 mcg  400 mcg Oral Daily    hydrocortisone 1 % cream 1 application  1 application Topical Daily PRN    HYDROmorphone (DILAUDID) injection 0 5 mg  0 5 mg Intravenous Q1H PRN    ibuprofen (MOTRIN) tablet 600 mg  600 mg Oral Q6H PRN    ketorolac (TORADOL) injection 30 mg  30 mg Intravenous Q6H PRN    lactated ringers infusion  125 mL/hr Intravenous Continuous    metoclopramide (REGLAN) injection 5 mg  5 mg Intravenous Q6H PRN    nalbuphine (NUBAIN) injection 5 mg  5 mg Intravenous Q3H PRN    naloxone (NARCAN) injection 0 1 mg  0 1 mg Intravenous Q3 min PRN    ondansetron (ZOFRAN) injection 4 mg  4 mg Intravenous Q4H PRN    ondansetron (ZOFRAN) injection 4 mg  4 mg Intravenous Q8H PRN    senna (SENOKOT) tablet 8 6 mg  1 tablet Oral HS PRN    simethicone (MYLICON) chewable tablet 80 mg  80 mg Oral 4x Daily PRN    sod citrate-citric acid (BICITRA) oral solution 30 mL  30 mL Oral 4x Daily PRN    witch hazel-glycerin (TUCKS) topical pad 1 pad  1 pad Topical Q4H PRN     Invasive Devices     Peripheral Intravenous Line            Peripheral IV 06/26/18 Left Wrist 1 day                     Teri Eagle MD PGY-2   6/27/2018 6:29 AM

## 2018-06-27 NOTE — PLAN OF CARE
DISCHARGE PLANNING     Discharge to home or other facility with appropriate resources Progressing        INFECTION - ADULT     Absence or prevention of progression during hospitalization Progressing        Knowledge Deficit     Patient/family/caregiver demonstrates understanding of disease process, treatment plan, medications, and discharge instructions Progressing        PAIN - ADULT     Verbalizes/displays adequate comfort level or baseline comfort level Progressing        POSTPARTUM     Experiences normal postpartum course Progressing     Appropriate maternal -  bonding Progressing     Establishment of infant feeding pattern Progressing     Incision(s), wounds(s) or drain site(s) healing without S/S of infection Progressing        SAFETY ADULT     Patient will remain free of falls Progressing

## 2018-06-28 RX ADMIN — DOCUSATE SODIUM 100 MG: 100 CAPSULE, LIQUID FILLED ORAL at 10:15

## 2018-06-28 RX ADMIN — HYDROCORTISONE 1 APPLICATION: 1 CREAM TOPICAL at 20:52

## 2018-06-28 RX ADMIN — DIPHENHYDRAMINE HCL 25 MG: 25 TABLET ORAL at 22:04

## 2018-06-28 RX ADMIN — Medication 400 MCG: at 10:26

## 2018-06-28 RX ADMIN — Medication 30 MG: at 10:26

## 2018-06-28 RX ADMIN — CHOLECALCIFEROL TAB 25 MCG (1000 UNIT) 1000 UNITS: 25 TAB at 10:26

## 2018-06-28 RX ADMIN — IBUPROFEN 600 MG: 600 TABLET ORAL at 14:52

## 2018-06-28 RX ADMIN — IBUPROFEN 600 MG: 600 TABLET ORAL at 22:05

## 2018-06-28 RX ADMIN — Medication 325 MG: at 10:15

## 2018-06-28 RX ADMIN — DIPHENHYDRAMINE HCL 25 MG: 25 TABLET ORAL at 10:15

## 2018-06-28 NOTE — PROGRESS NOTES
Progress Note - OB/GYN   Anne Peralta 27 y o  female MRN: [id number]  Unit/Bed#: -01 Encounter: [id number]    Assessment:  Postop Day #2 s/p RLTCS, stable  Hgb 11 3 --> 10 7g/dL  Spontaneously voiding    Plan:    1) Continue routine post partum/post op care   Encourage ambulation   Encourage breastfeeding   Anticipate discharge tomorrow    Subjective/Objective   Chief Complaint:     Post delivery  Patient is feeling well  Lochia WNL  Pain well controlled  Subjective:     Pain: yes, incisional, improved with meds  Tolerating PO: yes  Voiding: yes  Flatus: yes  BM: no  Ambulating: yes  Breastfeeding: no  Chest pain: no  Shortness of breath: no  Leg pain: no  Lochia: minimal    Objective:     Vitals: /73   Pulse 77   Temp 99 °F (37 2 °C) (Oral)   Resp 20   Ht 5' 7" (1 702 m)   Wt 86 2 kg (190 lb)   SpO2 98%   Breastfeeding?  No   BMI 29 76 kg/m²       Intake/Output Summary (Last 24 hours) at 06/28/18 6404  Last data filed at 06/27/18 0740   Gross per 24 hour   Intake                0 ml   Output              500 ml   Net             -500 ml       Physical Exam:     AAOx3, NAD  CV, RRR  CTA b/l, no WRR  Soft, non-tender, non-distended, no rebound or guarding, no CVA tenderness  Uterine fundus firm and non-tender, -1 cm below the umbilicus   Incision clean/dry/intact without signs of inflammation   sutures: clean, dry, and intact  Non tender      Lab Results   Component Value Date    WBC 14 34 (H) 06/27/2018    HGB 10 7 (L) 06/27/2018    HCT 33 1 (L) 06/27/2018    MCV 94 06/27/2018     06/27/2018                         Amparo Geller MD  6/28/2018  6:52 AM

## 2018-06-29 VITALS
HEIGHT: 67 IN | BODY MASS INDEX: 29.82 KG/M2 | SYSTOLIC BLOOD PRESSURE: 118 MMHG | HEART RATE: 86 BPM | TEMPERATURE: 98.8 F | WEIGHT: 190 LBS | OXYGEN SATURATION: 98 % | RESPIRATION RATE: 20 BRPM | DIASTOLIC BLOOD PRESSURE: 66 MMHG

## 2018-06-29 PROBLEM — O09.813 PREGNANCY RESULTING FROM IN VITRO FERTILIZATION IN THIRD TRIMESTER: Status: RESOLVED | Noted: 2018-04-06 | Resolved: 2018-06-29

## 2018-06-29 PROBLEM — Z87.51 HISTORY OF PRETERM DELIVERY: Status: ACTIVE | Noted: 2018-02-19

## 2018-06-29 PROBLEM — O34.219 PREVIOUS CESAREAN SECTION COMPLICATING PREGNANCY: Status: ACTIVE | Noted: 2018-06-29

## 2018-06-29 PROBLEM — Z3A.39 39 WEEKS GESTATION OF PREGNANCY: Status: ACTIVE | Noted: 2018-06-29

## 2018-06-29 PROBLEM — Z87.59 HISTORY OF PRE-ECLAMPSIA: Chronic | Status: ACTIVE | Noted: 2018-02-19

## 2018-06-29 RX ORDER — ACETAMINOPHEN 325 MG/1
TABLET ORAL
Qty: 30 TABLET | Refills: 0
Start: 2018-06-29 | End: 2018-07-27 | Stop reason: ALTCHOICE

## 2018-06-29 RX ORDER — IBUPROFEN 600 MG/1
600 TABLET ORAL EVERY 6 HOURS PRN
Qty: 30 TABLET | Refills: 0
Start: 2018-06-29 | End: 2018-12-18 | Stop reason: ALTCHOICE

## 2018-06-29 RX ORDER — OXYCODONE HYDROCHLORIDE AND ACETAMINOPHEN 5; 325 MG/1; MG/1
1 TABLET ORAL EVERY 4 HOURS PRN
Qty: 15 TABLET | Refills: 0 | Status: SHIPPED | OUTPATIENT
Start: 2018-06-29 | End: 2018-06-29 | Stop reason: HOSPADM

## 2018-06-29 RX ORDER — DOCUSATE SODIUM 100 MG/1
100 CAPSULE, LIQUID FILLED ORAL 2 TIMES DAILY PRN
Qty: 10 CAPSULE | Refills: 0
Start: 2018-06-29 | End: 2018-07-27 | Stop reason: ALTCHOICE

## 2018-06-29 RX ADMIN — Medication 400 MCG: at 08:30

## 2018-06-29 RX ADMIN — Medication 30 MG: at 08:30

## 2018-06-29 RX ADMIN — Medication 325 MG: at 08:30

## 2018-06-29 RX ADMIN — CHOLECALCIFEROL TAB 25 MCG (1000 UNIT) 1000 UNITS: 25 TAB at 08:30

## 2018-06-29 RX ADMIN — IBUPROFEN 600 MG: 600 TABLET ORAL at 08:31

## 2018-06-29 RX ADMIN — DIPHENHYDRAMINE HCL 25 MG: 25 TABLET ORAL at 04:08

## 2018-06-29 NOTE — PROGRESS NOTES
Progress Note - OB/GYN   Akin Nolasco 27 y o  female MRN: [id number]  Unit/Bed#:  318-01 Encounter: [id number]    Assessment:  Postop Day #3 s/p RLTCS, stable    Plan:    1) Continue routine post partum/post op care   Encourage ambulation   Encourage breastfeeding   Anticipate discharge today    Subjective/Objective   Chief Complaint:     Post delivery  Patient is feeling well  Lochia WNL  Pain well controlled  Subjective:     Pain: yes, incisional, improved with meds  Tolerating PO: yes  Voiding: yes  Flatus: yes  BM: yes  Ambulating: yes  Breastfeeding:  no  Chest pain: no  Shortness of breath: no  Leg pain: no  Lochia: minimal    Objective:     Vitals: /71 (BP Location: Left arm)   Pulse 65   Temp 98 9 °F (37 2 °C) (Oral)   Resp 16   Ht 5' 7" (1 702 m)   Wt 86 2 kg (190 lb)   SpO2 98%   Breastfeeding?  No   BMI 29 76 kg/m²     No intake or output data in the 24 hours ending 06/29/18 0530    Physical Exam:     AAOx3, NAD  CV, RRR  CTA b/l, no WRR  Soft, non-tender, non-distended, no rebound or guarding, no CVA tenderness  Uterine fundus firm and non-tender, -2 cm below the umbilicus   Incision clean/dry/intact without signs of inflammation   sutures: clean, dry, and intact  Non tender      Lab Results   Component Value Date    WBC 14 34 (H) 06/27/2018    HGB 10 7 (L) 06/27/2018    HCT 33 1 (L) 06/27/2018    MCV 94 06/27/2018     06/27/2018                         Mercedes Isidro MD  6/29/2018  6:39 AM

## 2018-06-29 NOTE — PLAN OF CARE
DISCHARGE PLANNING     Discharge to home or other facility with appropriate resources Completed        INFECTION - ADULT     Absence or prevention of progression during hospitalization Completed        Knowledge Deficit     Patient/family/caregiver demonstrates understanding of disease process, treatment plan, medications, and discharge instructions Completed        PAIN - ADULT     Verbalizes/displays adequate comfort level or baseline comfort level Completed        POSTPARTUM     Experiences normal postpartum course Completed     Appropriate maternal -  bonding Completed     Establishment of infant feeding pattern Completed     Incision(s), wounds(s) or drain site(s) healing without S/S of infection Completed        SAFETY ADULT     Patient will remain free of falls Completed

## 2018-06-29 NOTE — CASE MANAGEMENT
Notification of Maternity Inpatient Admission/Maternity Inpatient Authorization Request  This is a Notification of Maternity Inpatient Admission/Maternity Inpatient Authorization Request to our facility [facility]  Please be advised that this patient is currently in our facility under Inpatient Status  Below you will find the Birth/ Summary, Attending Physician and Facilitys information including NPI# and contact for the Utilization  assigned to the [facility] where the patient is receiving services  Please feel free to contact the Utilization Review Department with any questions  Mothers Information:  Cassi Yap  MRN: [id number]  YOB: 1985  Admission Date: 2018  5:52 AM  Discharge Date: 2018 11:20 AM  Disposition: Home/Self Care  Admitting Diagnosis:   O82  DELIVERY  [place] Information:  Estimated Date of Delivery: 18  Information for the patient's :  Dominguez Paul Graff  Ana Dwyer) [[id number]]     [place] Delivery Information:  Sex: male  Delivered 2018 8:30 AM by , Low Transverse; Gestational Age: 37w0d    [place] Measurements:  Weight: 8 lb 7 8 oz (3850 g); Height: 20"    APGAR 1 minute 5 minutes 10 minutes   Totals: 9 9      OB History      Para Term  AB Living    2 2 1 1   2    SAB TAB Ectopic Multiple Live Births          0 2        Obstetric Comments    Pre-eclampsia hx of , prior  at 34-2 weeks           Attending Physician:  MICAELA Garcia    Specialty- Obstetrics and Gynecology  [address] [id number]  [address] [place] [address]  Phone 1: [phone]  Phone 2: [phone]  Fax: [phone] [address]  [phone]  Tax ID: [id number]  NPI: [id number]    [address] [place] [address] in the [place] by Robe Bolanos for 2017  Network Utilization Review Department  Phone: [phone]; Fax [phone]  ATTENTION: The Network Utilization Review Department is now centralized for our 7 Facilities  Make a note that we have a new phone and fax numbers for our Department  Please call with any questions or concerns to [phone] and carefully follow the prompts so that you are directed to the right person  All voicemails are confidential  Fax any determinations, approvals, denials, and requests for initial or continue stay review clinical to [phone]  Due to HIGH CALL volume, it would be easier if you could please send faxed requests to expedite your requests and in part, help us provide discharge notifications faster

## 2018-07-02 ENCOUNTER — TRANSITIONAL CARE MANAGEMENT (OUTPATIENT)
Dept: INTERNAL MEDICINE CLINIC | Facility: CLINIC | Age: 33
End: 2018-07-02

## 2018-07-03 LAB — PLACENTA IN STORAGE: NORMAL

## 2018-07-12 ENCOUNTER — TELEPHONE (OUTPATIENT)
Dept: OBGYN CLINIC | Facility: CLINIC | Age: 33
End: 2018-07-12

## 2018-07-16 ENCOUNTER — TELEPHONE (OUTPATIENT)
Dept: OBGYN CLINIC | Facility: CLINIC | Age: 33
End: 2018-07-16

## 2018-07-16 NOTE — TELEPHONE ENCOUNTER
Returned pts' p c - discussed PP symptoms  Pt had questions regarding breast leaking & 1 episode of passing clots  Questions answered  -pt has a PP appt on 7/27/18   Advised to call office with any further questions/concerns

## 2018-07-27 ENCOUNTER — POSTPARTUM VISIT (OUTPATIENT)
Dept: OBGYN CLINIC | Facility: CLINIC | Age: 33
End: 2018-07-27

## 2018-07-27 VITALS — SYSTOLIC BLOOD PRESSURE: 118 MMHG | DIASTOLIC BLOOD PRESSURE: 78 MMHG | BODY MASS INDEX: 26.78 KG/M2 | WEIGHT: 171 LBS

## 2018-07-27 DIAGNOSIS — Z00.00 HEALTHCARE MAINTENANCE: Primary | ICD-10-CM

## 2018-07-27 PROCEDURE — 99024 POSTOP FOLLOW-UP VISIT: CPT | Performed by: OBSTETRICS & GYNECOLOGY

## 2018-07-27 RX ORDER — NORETHINDRONE ACETATE AND ETHINYL ESTRADIOL 1MG-20(21)
1 KIT ORAL DAILY
Qty: 28 TABLET | Refills: 0 | Status: SHIPPED | OUTPATIENT
Start: 2018-07-27 | End: 2018-08-24 | Stop reason: SDUPTHER

## 2018-07-27 NOTE — PROGRESS NOTES
Assessment/Plan:      Diagnoses and all orders for this visit:    Healthcare maintenance  -     norethindrone-ethinyl estradiol (JUNEL FE 1/20) 1-20 MG-MCG per tablet; Take 1 tablet by mouth daily    Postpartum state          Subjective:     Patient ID: Long Herr is a 27 y o  female  Patient is a 27-year-old female, para 2, Bottle feeding,  here for a postpartum visit without complaint  Patient was delivered by repeat uncomplicated low-transverse  delivery  Review of systems is negative for fevers heavy vaginal bleeding or pelvic pain  Patient states that her mood is good she denies feeling anxious overwhelmed or depressed  Patient requests oral contraceptive, she has used OCPS in the past without difficulty  Review of Systems   Constitutional: Negative for fever  Gastrointestinal: Negative for abdominal pain  Genitourinary: Negative for pelvic pain  Objective:     Physical Exam   Constitutional: She is oriented to person, place, and time  She appears well-developed and well-nourished  HENT:   Head: Normocephalic  Eyes: Pupils are equal, round, and reactive to light  Neck: No thyromegaly present  Pulmonary/Chest: Effort normal    Abdominal: Soft  She exhibits no distension and no mass  There is no tenderness  There is no rebound and no guarding  Neurological: She is alert and oriented to person, place, and time  Skin: Skin is warm  Psychiatric: She has a normal mood and affect   Her behavior is normal

## 2018-08-07 ENCOUNTER — TELEPHONE (OUTPATIENT)
Dept: OBGYN CLINIC | Facility: CLINIC | Age: 33
End: 2018-08-07

## 2018-08-07 NOTE — TELEPHONE ENCOUNTER
Patient is requesting we resubmit the last form she dropped off at our office  She would also like us to indicate that she had a  on 18 on the paperwork

## 2018-08-24 DIAGNOSIS — Z00.00 HEALTHCARE MAINTENANCE: ICD-10-CM

## 2018-08-24 RX ORDER — NORETHINDRONE ACETATE AND ETHINYL ESTRADIOL AND FERROUS FUMARATE 1MG-20(21)
KIT ORAL
Qty: 28 TABLET | Refills: 0 | Status: SHIPPED | OUTPATIENT
Start: 2018-08-24 | End: 2018-10-02 | Stop reason: SDUPTHER

## 2018-10-02 DIAGNOSIS — Z00.00 HEALTHCARE MAINTENANCE: ICD-10-CM

## 2018-10-02 RX ORDER — NORETHINDRONE ACETATE AND ETHINYL ESTRADIOL AND FERROUS FUMARATE 1MG-20(21)
KIT ORAL
Qty: 28 TABLET | Refills: 0 | Status: SHIPPED | OUTPATIENT
Start: 2018-10-02 | End: 2018-10-28 | Stop reason: SDUPTHER

## 2018-10-28 DIAGNOSIS — Z00.00 HEALTHCARE MAINTENANCE: ICD-10-CM

## 2018-10-29 RX ORDER — NORETHINDRONE ACETATE AND ETHINYL ESTRADIOL AND FERROUS FUMARATE 1MG-20(21)
KIT ORAL
Qty: 28 TABLET | Refills: 0 | Status: SHIPPED | OUTPATIENT
Start: 2018-10-29 | End: 2018-11-30 | Stop reason: SDUPTHER

## 2018-10-31 ENCOUNTER — CLINICAL SUPPORT (OUTPATIENT)
Dept: INTERNAL MEDICINE CLINIC | Facility: CLINIC | Age: 33
End: 2018-10-31
Payer: COMMERCIAL

## 2018-10-31 DIAGNOSIS — Z23 FLU VACCINE NEED: Primary | ICD-10-CM

## 2018-10-31 PROCEDURE — 90686 IIV4 VACC NO PRSV 0.5 ML IM: CPT

## 2018-10-31 PROCEDURE — 90471 IMMUNIZATION ADMIN: CPT

## 2018-11-30 DIAGNOSIS — Z00.00 HEALTHCARE MAINTENANCE: Primary | ICD-10-CM

## 2018-11-30 RX ORDER — NORETHINDRONE ACETATE AND ETHINYL ESTRADIOL 1MG-20(21)
1 KIT ORAL DAILY
Qty: 28 TABLET | Refills: 0 | Status: SHIPPED | OUTPATIENT
Start: 2018-11-30 | End: 2018-12-18 | Stop reason: SDUPTHER

## 2018-12-18 ENCOUNTER — ANNUAL EXAM (OUTPATIENT)
Dept: OBGYN CLINIC | Facility: MEDICAL CENTER | Age: 33
End: 2018-12-18
Payer: COMMERCIAL

## 2018-12-18 VITALS — WEIGHT: 165 LBS | BODY MASS INDEX: 25.84 KG/M2 | SYSTOLIC BLOOD PRESSURE: 104 MMHG | DIASTOLIC BLOOD PRESSURE: 70 MMHG

## 2018-12-18 DIAGNOSIS — Z01.419 ENCNTR FOR GYN EXAM (GENERAL) (ROUTINE) W/O ABN FINDINGS: Primary | ICD-10-CM

## 2018-12-18 DIAGNOSIS — Z00.00 HEALTHCARE MAINTENANCE: ICD-10-CM

## 2018-12-18 DIAGNOSIS — Z30.41 ENCOUNTER FOR SURVEILLANCE OF CONTRACEPTIVE PILLS: ICD-10-CM

## 2018-12-18 DIAGNOSIS — Z12.4 ENCOUNTER FOR PAPANICOLAOU SMEAR OF CERVIX WITH HUMAN PAPILLOMA VIRUS (HPV) DNA COTESTING: ICD-10-CM

## 2018-12-18 PROCEDURE — 99395 PREV VISIT EST AGE 18-39: CPT | Performed by: PHYSICIAN ASSISTANT

## 2018-12-18 PROCEDURE — G0145 SCR C/V CYTO,THINLAYER,RESCR: HCPCS | Performed by: PHYSICIAN ASSISTANT

## 2018-12-18 PROCEDURE — 87624 HPV HI-RISK TYP POOLED RSLT: CPT | Performed by: PHYSICIAN ASSISTANT

## 2018-12-18 RX ORDER — NORETHINDRONE ACETATE AND ETHINYL ESTRADIOL 1MG-20(21)
1 KIT ORAL DAILY
Qty: 90 TABLET | Refills: 4 | Status: SHIPPED | OUTPATIENT
Start: 2018-12-18 | End: 2020-08-10

## 2018-12-18 NOTE — ASSESSMENT & PLAN NOTE
I have discussed the importance of monthly self-breast exams, exercise and healthy diet as well as adequate intake of calcium and vitamin D  The patient declines STD testing  The current ASCCP guidelines were reviewed  The low risk patient will receive pap smear screening every 3 years or pap with HPV co-testing every 5 years  The patient previously had PAP in 2015 and is due again today  I emphasized the importance of an annual pelvic and breast exam  A yearly mammogram is recommended for breast cancer screening starting at age 36  All questions have been answered to her satisfaction

## 2018-12-18 NOTE — PROGRESS NOTES
Assessment/Plan:    Encounter for surveillance of contraceptive pills  Denies ACHES  Refill provided    Encntr for gyn exam (general) (routine) w/o abn findings  I have discussed the importance of monthly self-breast exams, exercise and healthy diet as well as adequate intake of calcium and vitamin D  The patient declines STD testing  The current ASCCP guidelines were reviewed  The low risk patient will receive pap smear screening every 3 years or pap with HPV co-testing every 5 years  The patient previously had PAP in 2015 and is due again today  I emphasized the importance of an annual pelvic and breast exam  A yearly mammogram is recommended for breast cancer screening starting at age 36  All questions have been answered to her satisfaction  Diagnoses and all orders for this visit:    Encntr for gyn exam (general) (routine) w/o abn findings  -     Liquid-based pap, screening    Encounter for Papanicolaou smear of cervix with human papilloma virus (HPV) DNA cotesting  -     Liquid-based pap, screening    Healthcare maintenance  -     norethindrone-ethinyl estradiol (JUNEL FE 1/20) 1-20 MG-MCG per tablet; Take 1 tablet by mouth daily    Encounter for surveillance of contraceptive pills        Subjective:      Patient ID: Deysi Clark is a 26 y o  female  The patient comes in today for annual Gyn exam  The patient has no history of abnormal periods, pelvic pain, abnormal vaginal discharge, breast mass or lumps, urinary symptoms, urinary incontinence, depression, and pelvic/vaginal pressure  Pt reports all additional systems reviewed are negative  OCPs for contra - happy w/ method  Last PAP 2015, WNL  3 6 y/o daughter, Dagoberto March 11 mo old son, Madi Smith to [place] to visit in laws for the holidays  The patient admits to monthly self breast exams, regular exercise, healthy diet, contraception use (OCP), and calcium and Vitamin D intake          Gynecologic Exam   The patient's pertinent negatives include no pelvic pain or vaginal discharge  Pertinent negatives include no abdominal pain, constipation, diarrhea, headaches, nausea or vomiting  The following portions of the patient's history were reviewed and updated as appropriate: allergies, current medications, past family history, past medical history, past social history, past surgical history and problem list     Review of Systems   Constitutional: Negative for appetite change, fatigue and unexpected weight change  Respiratory: Negative for chest tightness and shortness of breath  Cardiovascular: Negative for chest pain, palpitations and leg swelling  Gastrointestinal: Negative for abdominal pain, constipation, diarrhea, nausea and vomiting  Genitourinary: Negative for difficulty urinating, dyspareunia, menstrual problem, pelvic pain and vaginal discharge  Musculoskeletal: Negative for arthralgias and myalgias  Neurological: Negative for dizziness, light-headedness and headaches  Psychiatric/Behavioral: Negative for dysphoric mood  The patient is not nervous/anxious  All other systems reviewed and are negative  Objective:      /70 (BP Location: Left arm, Patient Position: Sitting)   Wt 74 8 kg (165 lb)   LMP 11/27/2018   BMI 25 84 kg/m²          Physical Exam   Constitutional: She is oriented to person, place, and time  She appears well-developed and well-nourished  HENT:   Head: Normocephalic and atraumatic  Neck: Neck supple  No thyromegaly present  Cardiovascular: Normal rate and regular rhythm  Pulmonary/Chest: Effort normal and breath sounds normal  Right breast exhibits no inverted nipple, no mass, no nipple discharge, no skin change and no tenderness  Left breast exhibits no inverted nipple, no mass, no nipple discharge, no skin change and no tenderness  Abdominal: Soft  Bowel sounds are normal  Hernia confirmed negative in the right inguinal area and confirmed negative in the left inguinal area  Genitourinary: Vagina normal and uterus normal  No breast swelling, tenderness, discharge or bleeding  There is no rash, tenderness, lesion or injury on the right labia  There is no rash, tenderness, lesion or injury on the left labia  Uterus is not deviated, not enlarged, not fixed and not tender  Cervix exhibits no motion tenderness, no discharge and no friability  Right adnexum displays no mass, no tenderness and no fullness  Left adnexum displays no mass, no tenderness and no fullness  No vaginal discharge found  Neurological: She is alert and oriented to person, place, and time  Skin: Skin is warm and dry  Psychiatric: She has a normal mood and affect  Nursing note and vitals reviewed

## 2018-12-21 LAB
HPV HR 12 DNA CVX QL NAA+PROBE: NEGATIVE
HPV16 DNA CVX QL NAA+PROBE: NEGATIVE
HPV18 DNA CVX QL NAA+PROBE: NEGATIVE

## 2018-12-27 ENCOUNTER — TELEPHONE (OUTPATIENT)
Dept: OBGYN CLINIC | Facility: CLINIC | Age: 33
End: 2018-12-27

## 2018-12-27 LAB
LAB AP GYN PRIMARY INTERPRETATION: NORMAL
LAB AP LMP: NORMAL
Lab: NORMAL

## 2018-12-27 NOTE — TELEPHONE ENCOUNTER
----- Message from Lubna Poe PA-C sent at 12/27/2018 12:59 PM EST -----  Cytology WNL  HPV results neg  Please notify pt  thanks

## 2019-04-18 ENCOUNTER — TELEPHONE (OUTPATIENT)
Dept: INTERNAL MEDICINE CLINIC | Facility: CLINIC | Age: 34
End: 2019-04-18

## 2019-04-30 ENCOUNTER — OFFICE VISIT (OUTPATIENT)
Dept: INTERNAL MEDICINE CLINIC | Facility: CLINIC | Age: 34
End: 2019-04-30
Payer: COMMERCIAL

## 2019-04-30 VITALS
HEIGHT: 67 IN | DIASTOLIC BLOOD PRESSURE: 80 MMHG | TEMPERATURE: 98.3 F | HEART RATE: 90 BPM | SYSTOLIC BLOOD PRESSURE: 118 MMHG | WEIGHT: 152.2 LBS | OXYGEN SATURATION: 98 % | RESPIRATION RATE: 18 BRPM | BODY MASS INDEX: 23.89 KG/M2

## 2019-04-30 DIAGNOSIS — Z13.1 SCREENING FOR DIABETES MELLITUS (DM): ICD-10-CM

## 2019-04-30 DIAGNOSIS — Z13.220 ENCOUNTER FOR LIPID SCREENING FOR CARDIOVASCULAR DISEASE: ICD-10-CM

## 2019-04-30 DIAGNOSIS — Z13.6 ENCOUNTER FOR LIPID SCREENING FOR CARDIOVASCULAR DISEASE: ICD-10-CM

## 2019-04-30 DIAGNOSIS — Z00.00 WELLNESS EXAMINATION: Primary | ICD-10-CM

## 2019-04-30 PROCEDURE — 99395 PREV VISIT EST AGE 18-39: CPT | Performed by: INTERNAL MEDICINE

## 2019-11-15 ENCOUNTER — CLINICAL SUPPORT (OUTPATIENT)
Dept: INTERNAL MEDICINE CLINIC | Facility: CLINIC | Age: 34
End: 2019-11-15
Payer: COMMERCIAL

## 2019-11-15 VITALS — SYSTOLIC BLOOD PRESSURE: 130 MMHG | DIASTOLIC BLOOD PRESSURE: 72 MMHG

## 2019-11-15 DIAGNOSIS — Z01.30 BP CHECK: ICD-10-CM

## 2019-11-15 DIAGNOSIS — Z23 NEED FOR INFLUENZA VACCINATION: Primary | ICD-10-CM

## 2019-11-15 PROCEDURE — 90471 IMMUNIZATION ADMIN: CPT

## 2019-11-15 PROCEDURE — 90686 IIV4 VACC NO PRSV 0.5 ML IM: CPT

## 2019-11-15 NOTE — PROGRESS NOTES
PT WAS HERE FOR HER FLU VACCINE AND ASKED TO HAVE HER BP CHECKED BECAUSE SHE WAS CURIOUS ABOUT IT  STATED SHE FEELS FINE THOUGH, NO SYMPTOMS       DISCUSSED BP WITH DR ROSENBERG, TOOK BP AGAIN, WAS A LITTLE BETTER SO WE SCHEDULED A BP CHECK FOR 1 MONTH

## 2019-11-29 ENCOUNTER — APPOINTMENT (OUTPATIENT)
Dept: LAB | Facility: CLINIC | Age: 34
End: 2019-11-29
Payer: COMMERCIAL

## 2019-11-29 ENCOUNTER — TELEPHONE (OUTPATIENT)
Dept: INTERNAL MEDICINE CLINIC | Facility: CLINIC | Age: 34
End: 2019-11-29

## 2019-11-29 ENCOUNTER — OFFICE VISIT (OUTPATIENT)
Dept: INTERNAL MEDICINE CLINIC | Facility: CLINIC | Age: 34
End: 2019-11-29
Payer: COMMERCIAL

## 2019-11-29 VITALS
OXYGEN SATURATION: 99 % | TEMPERATURE: 98.1 F | DIASTOLIC BLOOD PRESSURE: 72 MMHG | HEART RATE: 102 BPM | SYSTOLIC BLOOD PRESSURE: 106 MMHG | WEIGHT: 146.4 LBS | BODY MASS INDEX: 22.98 KG/M2 | HEIGHT: 67 IN

## 2019-11-29 DIAGNOSIS — R42 EPISODIC LIGHTHEADEDNESS: ICD-10-CM

## 2019-11-29 DIAGNOSIS — R53.83 OTHER FATIGUE: ICD-10-CM

## 2019-11-29 DIAGNOSIS — R00.0 RACING HEART BEAT: ICD-10-CM

## 2019-11-29 DIAGNOSIS — R00.0 RACING HEART BEAT: Primary | ICD-10-CM

## 2019-11-29 DIAGNOSIS — Z63.8 PARENTING STRESS: ICD-10-CM

## 2019-11-29 LAB
ALBUMIN SERPL BCP-MCNC: 4 G/DL (ref 3.5–5)
ALP SERPL-CCNC: 58 U/L (ref 46–116)
ALT SERPL W P-5'-P-CCNC: 22 U/L (ref 12–78)
ANION GAP SERPL CALCULATED.3IONS-SCNC: 9 MMOL/L (ref 4–13)
AST SERPL W P-5'-P-CCNC: 15 U/L (ref 5–45)
BASOPHILS # BLD AUTO: 0.03 THOUSANDS/ΜL (ref 0–0.1)
BASOPHILS NFR BLD AUTO: 0 % (ref 0–1)
BILIRUB SERPL-MCNC: 0.44 MG/DL (ref 0.2–1)
BUN SERPL-MCNC: 10 MG/DL (ref 5–25)
CALCIUM SERPL-MCNC: 9.2 MG/DL (ref 8.3–10.1)
CHLORIDE SERPL-SCNC: 106 MMOL/L (ref 100–108)
CO2 SERPL-SCNC: 28 MMOL/L (ref 21–32)
CREAT SERPL-MCNC: 0.73 MG/DL (ref 0.6–1.3)
EOSINOPHIL # BLD AUTO: 0.01 THOUSAND/ΜL (ref 0–0.61)
EOSINOPHIL NFR BLD AUTO: 0 % (ref 0–6)
ERYTHROCYTE [DISTWIDTH] IN BLOOD BY AUTOMATED COUNT: 13.5 % (ref 11.6–15.1)
GFR SERPL CREATININE-BSD FRML MDRD: 106 ML/MIN/1.73SQ M
GLUCOSE SERPL-MCNC: 90 MG/DL (ref 65–140)
HCT VFR BLD AUTO: 39.5 % (ref 34.8–46.1)
HGB BLD-MCNC: 12.7 G/DL (ref 11.5–15.4)
IMM GRANULOCYTES # BLD AUTO: 0.02 THOUSAND/UL (ref 0–0.2)
IMM GRANULOCYTES NFR BLD AUTO: 0 % (ref 0–2)
LYMPHOCYTES # BLD AUTO: 1.78 THOUSANDS/ΜL (ref 0.6–4.47)
LYMPHOCYTES NFR BLD AUTO: 19 % (ref 14–44)
MCH RBC QN AUTO: 29.5 PG (ref 26.8–34.3)
MCHC RBC AUTO-ENTMCNC: 32.2 G/DL (ref 31.4–37.4)
MCV RBC AUTO: 92 FL (ref 82–98)
MONOCYTES # BLD AUTO: 0.33 THOUSAND/ΜL (ref 0.17–1.22)
MONOCYTES NFR BLD AUTO: 4 % (ref 4–12)
NEUTROPHILS # BLD AUTO: 7.14 THOUSANDS/ΜL (ref 1.85–7.62)
NEUTS SEG NFR BLD AUTO: 77 % (ref 43–75)
NRBC BLD AUTO-RTO: 0 /100 WBCS
PLATELET # BLD AUTO: 216 THOUSANDS/UL (ref 149–390)
PMV BLD AUTO: 10.8 FL (ref 8.9–12.7)
POTASSIUM SERPL-SCNC: 3.4 MMOL/L (ref 3.5–5.3)
PROT SERPL-MCNC: 7.9 G/DL (ref 6.4–8.2)
RBC # BLD AUTO: 4.3 MILLION/UL (ref 3.81–5.12)
SODIUM SERPL-SCNC: 143 MMOL/L (ref 136–145)
TSH SERPL DL<=0.05 MIU/L-ACNC: 1.11 UIU/ML (ref 0.36–3.74)
WBC # BLD AUTO: 9.31 THOUSAND/UL (ref 4.31–10.16)

## 2019-11-29 PROCEDURE — 84443 ASSAY THYROID STIM HORMONE: CPT | Performed by: INTERNAL MEDICINE

## 2019-11-29 PROCEDURE — 36415 COLL VENOUS BLD VENIPUNCTURE: CPT

## 2019-11-29 PROCEDURE — 3008F BODY MASS INDEX DOCD: CPT | Performed by: INTERNAL MEDICINE

## 2019-11-29 PROCEDURE — 93000 ELECTROCARDIOGRAM COMPLETE: CPT | Performed by: INTERNAL MEDICINE

## 2019-11-29 PROCEDURE — 80053 COMPREHEN METABOLIC PANEL: CPT

## 2019-11-29 PROCEDURE — 99214 OFFICE O/P EST MOD 30 MIN: CPT | Performed by: INTERNAL MEDICINE

## 2019-11-29 PROCEDURE — 1036F TOBACCO NON-USER: CPT | Performed by: INTERNAL MEDICINE

## 2019-11-29 PROCEDURE — 85025 COMPLETE CBC W/AUTO DIFF WBC: CPT | Performed by: INTERNAL MEDICINE

## 2019-11-29 SDOH — SOCIAL STABILITY - SOCIAL INSECURITY: OTHER SPECIFIED PROBLEMS RELATED TO PRIMARY SUPPORT GROUP: Z63.8

## 2019-11-29 NOTE — TELEPHONE ENCOUNTER
Lightheadness without cause   Going on for about 2 weeks   She saw you recently and got a flu shot but doesn't have these reactions    Usually feels like this in the morning and yesterday lasted a while   She is drinking a lot of fluids   Please advise Thanks   Declines- fever,chills,nausea,falls, loss conciousness, blury vision, headaches, diarrhea, no cold symptons,

## 2019-11-29 NOTE — TELEPHONE ENCOUNTER
If she feeling like passing out, should have someone take her to ER    If not, I can see her today/this afternoon

## 2019-11-29 NOTE — PROGRESS NOTES
Assessment/Plan:     Diagnoses and all orders for this visit:    Racing heart beat  Comments:  intermittently, likely related to mild dehydration/inadequate rest & sleep, stress and over-exertion  EKG no acute changes, will check BW today  Orders:  -     POCT ECG  -     Comprehensive metabolic panel; Future  -     CBC and differential  -     TSH, 3rd generation with Free T4 reflex    Episodic lightheadedness  Comments:  likely related to conditions as above  Orders:  -     Comprehensive metabolic panel; Future  -     CBC and differential  -     TSH, 3rd generation with Free T4 reflex    Other fatigue  -     Comprehensive metabolic panel; Future  -     CBC and differential  -     TSH, 3rd generation with Free T4 reflex    Parenting stress  Comments:  counseling provided today, t/c appt with therapist(Melanie not interested in this at this time)      Spent >25 mins with patient including >50% of time counseling on symptoms/stress/testing and test results and for coordination of care    Subjective:      Patient ID: Mary Carmen Archibald is a 37 y o  female  HPI    Here for follow up, here with 2 small children and  during today's appt  Takes care of 2 children and younger son is very attached to patient, if she leaves the room -> he develops a tantrum  Orrcookie Siemens has been feeling lightheaded with positional changes and when lying down  She has had an occ heart racing episode and chest pains in upper chest lasting a couple of moments each time   states she hasn't been sleeping well for a while due to obligations for kids, kids waking up and interrupting her rest   She has not had much of a break from her 35 year old son since he was born  She hasn't eaten or drank much foods/fluids today  Orthostatics borderline positive with elevation in HR & symptoms with position changes  No other complaints      Past Medical History:   Diagnosis Date    Anemia     Female infertility     IVF pregnacny 2017, IVF 2014    Gestational hypertension, third trimester     resolved: May 12, 2015    Infertility, female     Migraine     Resolved: Nov 3, 2015    Ovarian cyst     removed 2005, 6lb cyst    Preeclampsia     Pregnancy resulting from in vitro fertilization in third trimester 4/6/2018    Seasonal allergies     Type AB blood, Rh positive     Varicella     had as child around 8years old     Visual impairment     eywear      Vitals:    11/29/19 1353 11/29/19 1358 11/29/19 1403   BP: 110/74 110/72 106/72   BP Location: Left arm Left arm Left arm   Patient Position: Sitting Supine Standing   Cuff Size: Standard Standard Standard   Pulse: 92 90 102   Temp: 98 1 °F (36 7 °C)     SpO2: 99%     Weight: 66 4 kg (146 lb 6 4 oz)     Height: 5' 7" (1 702 m)       Body mass index is 22 93 kg/m²  Current Outpatient Medications:     Cholecalciferol (VITAMIN D3 PO), Take 1,000 Units by mouth daily  , Disp: , Rfl:     Coenzyme Q10 (COQ10 PO), Take 1 tablet by mouth daily  , Disp: , Rfl:     ferrous sulfate 325 (65 Fe) mg tablet, Take 325 mg by mouth daily with breakfast, Disp: , Rfl:     FOLIC ACID PO, Take 1 tablet by mouth daily  , Disp: , Rfl:     multivitamin (THERAGRAN) TABS, Take 1 tablet by mouth daily, Disp: , Rfl:     norethindrone-ethinyl estradiol (JUNEL FE 1/20) 1-20 MG-MCG per tablet, Take 1 tablet by mouth daily, Disp: 90 tablet, Rfl: 4  Allergies   Allergen Reactions    Neosporin [Neomycin-Bacitracin Zn-Polymyx] Hives    Pollen Extract Allergic Rhinitis         Review of Systems   Constitutional: Positive for fatigue  Negative for fever  HENT: Negative for congestion  Eyes: Negative for visual disturbance  Respiratory: Negative for shortness of breath  Cardiovascular: Positive for palpitations  Gastrointestinal: Negative for abdominal pain  Endocrine: Negative for polyuria  Genitourinary: Negative for dysuria  Musculoskeletal: Negative for arthralgias  Skin: Negative for rash  Allergic/Immunologic: Negative for immunocompromised state  Neurological: Positive for light-headedness  Negative for headaches  Psychiatric/Behavioral: Positive for sleep disturbance  Negative for dysphoric mood  The patient is not nervous/anxious  Objective:      /72 (BP Location: Left arm, Patient Position: Standing, Cuff Size: Standard)   Pulse 102 Comment: felt lightheaded standing up per MA  Temp 98 1 °F (36 7 °C)   Ht 5' 7" (1 702 m)   Wt 66 4 kg (146 lb 6 4 oz)   SpO2 99%   BMI 22 93 kg/m²          Physical Exam   Constitutional: She appears well-developed and well-nourished  Appears fatigued/tired but in no acute distress   HENT:   Head: Normocephalic and atraumatic  Eyes: Pupils are equal, round, and reactive to light  Cardiovascular: Normal rate and regular rhythm  No murmur heard  Pulmonary/Chest: Effort normal and breath sounds normal  She has no wheezes  She has no rales  Abdominal: Soft  Bowel sounds are normal  There is no tenderness  Musculoskeletal: She exhibits no edema  Neurological: She is alert  Psychiatric: She has a normal mood and affect  Her behavior is normal    Vitals reviewed  EKG: sinus rhythm, mild IVCD, unchanged from previous EKG dated 1/4/2017 & no acute ST-T segment changes

## 2019-11-29 NOTE — TELEPHONE ENCOUNTER
----- Message from Raul Fitzpatrick DO sent at 11/29/2019  4:18 PM EST -----  BW is back and looks fine except a mild reduction in potassium level to 3 4    Okay to add a banana per day for next few days & c/w our current treatment plan

## 2019-11-29 NOTE — TELEPHONE ENCOUNTER
Does she feel like she is going to pass out? If no, Can she come in today to see me?     I have appts available this afternoon

## 2019-11-29 NOTE — PATIENT INSTRUCTIONS
1  Blood work today, my office will call you with results  2  Aim for adequate sleep/hydration  3  Return in 3 months or sooner if questions    Stress   AMBULATORY CARE:   Stress  is a feeling of tension or strain related to the events and pressures of everyday life  Learn to cope and control your stress to help you function in a healthy way  Stress can be caused by many different things, including any of the following:  · Loss of a loved one or a job    · Life events, such as having a baby, buying a house, or getting a divorce    · Medical conditions, such as an acute or long-term illness or a new diagnosis  Common symptoms of too much stress include the following:   · Emotional:      ¨ Crying    ¨ Anxiety or nervousness    ¨ Easily upset    ¨ Edgy, angry, or impatient    ¨ Feeling overwhelmed    · Physical:      ¨ Headaches    ¨ Tiredness    ¨ Feeling restless    ¨ Sleep problems    ¨ Heartburn    · Mental:      ¨ Trouble thinking clearly or making decisions    ¨ Memory loss or forgetfulness    ¨ Constant worry    · Social:      ¨ Substance abuse    ¨ Overeating    ¨ Isolation or withdrawal from others    ¨ Decreased desire for sexual intimacy    ¨ Feeling bitter, resentful, or impatient with others  Call 911 for any of the following:   · You feel like hurting yourself or someone else  · You feel you are overwhelmed and can no longer handle things by yourself  Contact your healthcare provider if:   · You have trouble coping with your stress  · Your symptoms cause problems in your relationships  · You feel depressed  · You have trouble controlling your anger  · You have started to use alcohol, illegal drugs, or prescription medicines, or you increase your current use  · You have questions or concerns about your condition or care  Ways to manage your stress:  Learn what causes you stress  Not all stress can be avoided   Instead, change how you cope with stress by doing any of the following:  · Learn relaxation techniques, such as yoga, meditation, or listening to music  Take at least 30 minutes a day to do something you enjoy  This may include taking a bath or reading a book  · Do deep breathing exercises during times of increased stress  Sit up straight and take a slow, deep breath in through your nose  Then breathe out slowly through your mouth  Take twice as long to breathe out as you do when you breathe in  Repeat this a few times until you feel calmer or more focused  · Set realistic goals for yourself  Make a list of tasks and prioritize them  Focus on one task at a time  · Talk to someone about things that upset you  Talk to a trusted friend, family member, or support group  Try to stop yourself when you think negative, angry, or discouraging thoughts  · Take time to exercise  Start slowly, such as walking 1 to 2 blocks each day  Stretch and relax your muscles often  Ask about the best exercise plan for you  · Eat a variety of healthy foods  Healthy foods include fruits, vegetables, whole-grain breads, low-fat dairy products, beans, lean meats, and fish  Follow up with your healthcare provider as directed:  Write down your questions so you remember to ask them during your visits  © 2017 [facility] Information is for End User's use only and may not be sold, redistributed or otherwise used for commercial purposes  All illustrations and images included in CareNotes® are the copyrighted property of [employer]  or Robe Bolanos  The above information is an  only  It is not intended as medical advice for individual conditions or treatments  Talk to your doctor, nurse or pharmacist before following any medical regimen to see if it is safe and effective for you

## 2019-12-17 ENCOUNTER — TELEPHONE (OUTPATIENT)
Dept: INTERNAL MEDICINE CLINIC | Facility: CLINIC | Age: 34
End: 2019-12-17

## 2019-12-17 NOTE — TELEPHONE ENCOUNTER
Yes, Okay to cancel BP check tmrw & keep appt in march 2020    Please wish [place] & family Happy holidays from me!

## 2019-12-17 NOTE — TELEPHONE ENCOUNTER
Pt called back to cancel, she mentions happy holidays and says that increasing her potassium really helped her as she is doing a lot better now

## 2019-12-17 NOTE — TELEPHONE ENCOUNTER
Pt asked if her BP check appt tomorrow morning was still necessary or if she could cancel it  Please advise

## 2020-03-06 ENCOUNTER — OFFICE VISIT (OUTPATIENT)
Dept: INTERNAL MEDICINE CLINIC | Facility: CLINIC | Age: 35
End: 2020-03-06
Payer: COMMERCIAL

## 2020-03-06 VITALS
OXYGEN SATURATION: 99 % | WEIGHT: 155 LBS | HEIGHT: 67 IN | SYSTOLIC BLOOD PRESSURE: 122 MMHG | DIASTOLIC BLOOD PRESSURE: 74 MMHG | BODY MASS INDEX: 24.33 KG/M2 | HEART RATE: 72 BPM

## 2020-03-06 DIAGNOSIS — Z63.8 PARENTING STRESS: Primary | ICD-10-CM

## 2020-03-06 DIAGNOSIS — Z13.220 ENCOUNTER FOR LIPID SCREENING FOR CARDIOVASCULAR DISEASE: ICD-10-CM

## 2020-03-06 DIAGNOSIS — Z13.6 ENCOUNTER FOR LIPID SCREENING FOR CARDIOVASCULAR DISEASE: ICD-10-CM

## 2020-03-06 DIAGNOSIS — Z13.1 SCREENING FOR DIABETES MELLITUS (DM): ICD-10-CM

## 2020-03-06 DIAGNOSIS — E55.9 VITAMIN D DEFICIENCY: ICD-10-CM

## 2020-03-06 PROCEDURE — 1036F TOBACCO NON-USER: CPT | Performed by: INTERNAL MEDICINE

## 2020-03-06 PROCEDURE — 3074F SYST BP LT 130 MM HG: CPT | Performed by: INTERNAL MEDICINE

## 2020-03-06 PROCEDURE — 99213 OFFICE O/P EST LOW 20 MIN: CPT | Performed by: INTERNAL MEDICINE

## 2020-03-06 PROCEDURE — 3008F BODY MASS INDEX DOCD: CPT | Performed by: INTERNAL MEDICINE

## 2020-03-06 PROCEDURE — 3078F DIAST BP <80 MM HG: CPT | Performed by: INTERNAL MEDICINE

## 2020-03-06 SDOH — SOCIAL STABILITY - SOCIAL INSECURITY: OTHER SPECIFIED PROBLEMS RELATED TO PRIMARY SUPPORT GROUP: Z63.8

## 2020-03-06 NOTE — PROGRESS NOTES
Assessment/Plan:     Diagnoses and all orders for this visit:    Parenting stress  Comments:  pt feeling better, including with addition of potassium rich foods in diet  counseling provided and f/u in fall 2020 or prn    Vitamin D deficiency  Comments:  taking vit D OTC, re-check level with next BW  Orders:  -     Vitamin D 25 hydroxy; Future    Encounter for lipid screening for cardiovascular disease  -     Lipid Panel with Direct LDL reflex; Future    Screening for diabetes mellitus (DM)  -     Basic metabolic panel; Future      advised to stop coenzyme Q10 and on limited benefits of daily, long-term multivitamin use    Subjective:      Patient ID: David East is a 37 y o  female  HPI    Here for follow up, here with 1 5 yr old son during today's appt  Sonja Gifford is feeling better since last seeing me  She is having couple of add'l servings  Of potassium rich foods per day & add'l fluids which has helped her feel better, no more palpitations  She is still busy with children, having trouble sleeping b/c of her young children having sleep issues but is coping  Sonja Gifford is taking some supplements from when she was pregnant(underwent IVF) and is considering stopping these  ROS Otherwise negative, no other complaints  Past Medical History:   Diagnosis Date    Anemia     Female infertility     IVF pregnacny 2017, IVF 2014    Gestational hypertension, third trimester     resolved:  May 12, 2015    Infertility, female     Migraine     Resolved: Nov 3, 2015    Ovarian cyst     removed 2005, 6lb cyst    Preeclampsia     Pregnancy resulting from in vitro fertilization in third trimester 4/6/2018    Seasonal allergies     Type AB blood, Rh positive     Varicella     had as child around 8years old     Visual impairment     eywear      Vitals:    03/06/20 0940   BP: 122/74   BP Location: Left arm   Patient Position: Sitting   Cuff Size: Standard   Pulse: 72   SpO2: 99%   Weight: 70 3 kg (155 lb)   Height: 5' 7" (1 702 m)     Body mass index is 24 28 kg/m²  Current Outpatient Medications:     Cholecalciferol (VITAMIN D3 PO), Take 1,000 Units by mouth daily  , Disp: , Rfl:     Coenzyme Q10 (COQ10 PO), Take 1 tablet by mouth daily  , Disp: , Rfl:     multivitamin (THERAGRAN) TABS, Take 1 tablet by mouth daily, Disp: , Rfl:     norethindrone-ethinyl estradiol (JUNEL FE 1/20) 1-20 MG-MCG per tablet, Take 1 tablet by mouth daily, Disp: 90 tablet, Rfl: 4  Allergies   Allergen Reactions    Neosporin [Neomycin-Bacitracin Zn-Polymyx] Hives    Pollen Extract Allergic Rhinitis         Review of Systems   Constitutional: Negative for fever  HENT: Negative for congestion  Eyes: Negative for visual disturbance  Respiratory: Negative for shortness of breath  Cardiovascular: Negative for chest pain and leg swelling  Gastrointestinal: Negative for abdominal pain  Endocrine: Negative for polyuria  Genitourinary: Negative for dysuria  Allergic/Immunologic: Negative for immunocompromised state  Neurological: Negative for dizziness  Psychiatric/Behavioral: Positive for sleep disturbance (related to her young children's sleep patterns)  Negative for dysphoric mood  Objective:      /74 (BP Location: Left arm, Patient Position: Sitting, Cuff Size: Standard)   Pulse 72   Ht 5' 7" (1 702 m)   Wt 70 3 kg (155 lb)   SpO2 99%   BMI 24 28 kg/m²          Physical Exam   Constitutional: She appears well-developed and well-nourished  HENT:   Head: Normocephalic and atraumatic  Eyes: Pupils are equal, round, and reactive to light  Cardiovascular: Normal rate and regular rhythm  No murmur heard  Pulmonary/Chest: Effort normal and breath sounds normal  She has no wheezes  She has no rales  Abdominal: Soft  Bowel sounds are normal  There is no tenderness  Musculoskeletal: She exhibits no edema  Neurological: She is alert  Psychiatric: She has a normal mood and affect   Her behavior is normal  Vitals reviewed        Results for orders placed or performed in visit on 11/29/19   Comprehensive metabolic panel   Result Value Ref Range    Sodium 143 136 - 145 mmol/L    Potassium 3 4 (L) 3 5 - 5 3 mmol/L    Chloride 106 100 - 108 mmol/L    CO2 28 21 - 32 mmol/L    ANION GAP 9 4 - 13 mmol/L    BUN 10 5 - 25 mg/dL    Creatinine 0 73 0 60 - 1 30 mg/dL    Glucose 90 65 - 140 mg/dL    Calcium 9 2 8 3 - 10 1 mg/dL    AST 15 5 - 45 U/L    ALT 22 12 - 78 U/L    Alkaline Phosphatase 58 46 - 116 U/L    Total Protein 7 9 6 4 - 8 2 g/dL    Albumin 4 0 3 5 - 5 0 g/dL    Total Bilirubin 0 44 0 20 - 1 00 mg/dL    eGFR 106 ml/min/1 73sq m

## 2020-03-06 NOTE — PATIENT INSTRUCTIONS
1  Okay to stop coenzyme Q10  2  Blood work in December  3   Return after blood work for annual physical

## 2020-05-01 ENCOUNTER — TELEMEDICINE (OUTPATIENT)
Dept: INTERNAL MEDICINE CLINIC | Facility: CLINIC | Age: 35
End: 2020-05-01
Payer: COMMERCIAL

## 2020-05-01 DIAGNOSIS — R53.83 OTHER FATIGUE: ICD-10-CM

## 2020-05-01 DIAGNOSIS — F41.9 ANXIETY: Primary | ICD-10-CM

## 2020-05-01 DIAGNOSIS — Z86.39 HISTORY OF LOW POTASSIUM: ICD-10-CM

## 2020-05-01 PROCEDURE — 99213 OFFICE O/P EST LOW 20 MIN: CPT | Performed by: INTERNAL MEDICINE

## 2020-05-01 RX ORDER — DULOXETIN HYDROCHLORIDE 20 MG/1
20 CAPSULE, DELAYED RELEASE ORAL
Qty: 30 CAPSULE | Refills: 0 | Status: SHIPPED | OUTPATIENT
Start: 2020-05-01 | End: 2020-05-20 | Stop reason: SDUPTHER

## 2020-05-05 ENCOUNTER — TELEPHONE (OUTPATIENT)
Dept: OBGYN CLINIC | Facility: CLINIC | Age: 35
End: 2020-05-05

## 2020-05-20 ENCOUNTER — TELEMEDICINE (OUTPATIENT)
Dept: INTERNAL MEDICINE CLINIC | Facility: CLINIC | Age: 35
End: 2020-05-20
Payer: COMMERCIAL

## 2020-05-20 ENCOUNTER — TELEPHONE (OUTPATIENT)
Dept: OTHER | Facility: OTHER | Age: 35
End: 2020-05-20

## 2020-05-20 DIAGNOSIS — F41.9 ANXIETY: ICD-10-CM

## 2020-05-20 PROCEDURE — 99214 OFFICE O/P EST MOD 30 MIN: CPT | Performed by: INTERNAL MEDICINE

## 2020-05-20 RX ORDER — DULOXETIN HYDROCHLORIDE 20 MG/1
20 CAPSULE, DELAYED RELEASE ORAL
Qty: 30 CAPSULE | Refills: 1 | Status: SHIPPED | OUTPATIENT
Start: 2020-05-20 | End: 2020-05-24 | Stop reason: SDUPTHER

## 2020-05-24 DIAGNOSIS — F41.9 ANXIETY: ICD-10-CM

## 2020-05-24 RX ORDER — DULOXETIN HYDROCHLORIDE 20 MG/1
20 CAPSULE, DELAYED RELEASE ORAL
Qty: 30 CAPSULE | Refills: 0 | Status: SHIPPED | OUTPATIENT
Start: 2020-05-24 | End: 2020-06-21

## 2020-06-02 ENCOUNTER — TELEMEDICINE (OUTPATIENT)
Dept: INTERNAL MEDICINE CLINIC | Facility: CLINIC | Age: 35
End: 2020-06-02
Payer: COMMERCIAL

## 2020-06-02 DIAGNOSIS — R20.0 NUMBNESS AND TINGLING OF LEFT HAND: ICD-10-CM

## 2020-06-02 DIAGNOSIS — R20.2 NUMBNESS AND TINGLING OF LEFT HAND: ICD-10-CM

## 2020-06-02 DIAGNOSIS — R20.2 TINGLING OF SKIN: Primary | ICD-10-CM

## 2020-06-02 DIAGNOSIS — F41.9 ANXIETY: ICD-10-CM

## 2020-06-02 DIAGNOSIS — R20.2 TINGLING OF BOTH FEET: ICD-10-CM

## 2020-06-02 PROCEDURE — 99213 OFFICE O/P EST LOW 20 MIN: CPT | Performed by: INTERNAL MEDICINE

## 2020-06-02 RX ORDER — HYDROQUINONE 40 MG/G
CREAM TOPICAL
COMMUNITY
Start: 2020-04-06 | End: 2021-06-09 | Stop reason: ALTCHOICE

## 2020-06-04 ENCOUNTER — ANNUAL EXAM (OUTPATIENT)
Dept: OBGYN CLINIC | Facility: CLINIC | Age: 35
End: 2020-06-04
Payer: COMMERCIAL

## 2020-06-04 VITALS
WEIGHT: 149 LBS | DIASTOLIC BLOOD PRESSURE: 62 MMHG | SYSTOLIC BLOOD PRESSURE: 110 MMHG | HEIGHT: 67 IN | BODY MASS INDEX: 23.39 KG/M2

## 2020-06-04 DIAGNOSIS — N91.2 AMENORRHEA: ICD-10-CM

## 2020-06-04 DIAGNOSIS — Z01.419 ENCOUNTER FOR GYNECOLOGICAL EXAMINATION WITHOUT ABNORMAL FINDING: Primary | ICD-10-CM

## 2020-06-04 PROBLEM — Z98.891 S/P REPEAT LOW TRANSVERSE C-SECTION: Status: RESOLVED | Noted: 2018-06-26 | Resolved: 2020-06-04

## 2020-06-04 PROBLEM — Z30.41 ENCOUNTER FOR SURVEILLANCE OF CONTRACEPTIVE PILLS: Status: RESOLVED | Noted: 2018-12-18 | Resolved: 2020-06-04

## 2020-06-04 PROBLEM — Z12.4 ENCOUNTER FOR PAPANICOLAOU SMEAR OF CERVIX WITH HUMAN PAPILLOMA VIRUS (HPV) DNA COTESTING: Status: RESOLVED | Noted: 2018-12-18 | Resolved: 2020-06-04

## 2020-06-04 PROBLEM — Z87.51 HISTORY OF PRETERM DELIVERY: Status: RESOLVED | Noted: 2018-02-19 | Resolved: 2020-06-04

## 2020-06-04 PROBLEM — Z87.59 HISTORY OF PRE-ECLAMPSIA: Chronic | Status: RESOLVED | Noted: 2018-02-19 | Resolved: 2020-06-04

## 2020-06-04 LAB — SL AMB POCT URINE HCG: NEGATIVE

## 2020-06-04 PROCEDURE — 3008F BODY MASS INDEX DOCD: CPT | Performed by: OBSTETRICS & GYNECOLOGY

## 2020-06-04 PROCEDURE — 3008F BODY MASS INDEX DOCD: CPT | Performed by: INTERNAL MEDICINE

## 2020-06-04 PROCEDURE — 3078F DIAST BP <80 MM HG: CPT | Performed by: OBSTETRICS & GYNECOLOGY

## 2020-06-04 PROCEDURE — 99395 PREV VISIT EST AGE 18-39: CPT | Performed by: OBSTETRICS & GYNECOLOGY

## 2020-06-04 PROCEDURE — 81025 URINE PREGNANCY TEST: CPT | Performed by: OBSTETRICS & GYNECOLOGY

## 2020-06-04 PROCEDURE — 3074F SYST BP LT 130 MM HG: CPT | Performed by: OBSTETRICS & GYNECOLOGY

## 2020-06-21 DIAGNOSIS — F41.9 ANXIETY: ICD-10-CM

## 2020-06-21 RX ORDER — DULOXETIN HYDROCHLORIDE 20 MG/1
20 CAPSULE, DELAYED RELEASE ORAL
Qty: 30 CAPSULE | Refills: 2 | Status: SHIPPED | OUTPATIENT
Start: 2020-06-21 | End: 2020-10-05

## 2020-06-29 ENCOUNTER — TELEMEDICINE (OUTPATIENT)
Dept: INTERNAL MEDICINE CLINIC | Facility: CLINIC | Age: 35
End: 2020-06-29
Payer: COMMERCIAL

## 2020-06-29 DIAGNOSIS — F41.9 ANXIETY: Primary | ICD-10-CM

## 2020-06-29 PROCEDURE — 99213 OFFICE O/P EST LOW 20 MIN: CPT | Performed by: INTERNAL MEDICINE

## 2020-07-17 ENCOUNTER — TELEPHONE (OUTPATIENT)
Dept: OBGYN CLINIC | Facility: CLINIC | Age: 35
End: 2020-07-17

## 2020-07-17 DIAGNOSIS — Z80.3 FAMILY HISTORY OF MALIGNANT NEOPLASM OF BREAST: Primary | ICD-10-CM

## 2020-07-17 NOTE — TELEPHONE ENCOUNTER
Pt called to report she got her period on 6/22 it lasted for 14 days, now stopped, and she thinks her aunt had breast ca approx in 1995 she was 38

## 2020-08-10 DIAGNOSIS — Z00.00 HEALTHCARE MAINTENANCE: ICD-10-CM

## 2020-08-10 RX ORDER — NORETHINDRONE ACETATE AND ETHINYL ESTRADIOL AND FERROUS FUMARATE 1MG-20(21)
KIT ORAL
Qty: 84 TABLET | Refills: 0 | Status: SHIPPED | OUTPATIENT
Start: 2020-08-10 | End: 2020-10-29

## 2020-08-18 NOTE — TELEPHONE ENCOUNTER
Patient should begin mammography given a first degree relative with breast cancer  Please order for this diagnosis and send to patient in the mail        Thanks!!

## 2020-08-19 NOTE — TELEPHONE ENCOUNTER
Pt has been on Junel 1//20 for long time  Has missed periods and also - last menses lasted 14 days  Pt to take pills on time and if after 3 mos - if another 14 day menses - tcb   Rx for mammo - fam hx of br ca - entered

## 2020-10-04 DIAGNOSIS — F41.9 ANXIETY: ICD-10-CM

## 2020-10-05 RX ORDER — DULOXETIN HYDROCHLORIDE 20 MG/1
20 CAPSULE, DELAYED RELEASE ORAL
Qty: 90 CAPSULE | Refills: 0 | Status: SHIPPED | OUTPATIENT
Start: 2020-10-05 | End: 2021-01-04

## 2020-10-26 DIAGNOSIS — Z00.00 HEALTHCARE MAINTENANCE: ICD-10-CM

## 2020-10-29 ENCOUNTER — LAB (OUTPATIENT)
Dept: LAB | Facility: CLINIC | Age: 35
End: 2020-10-29
Payer: COMMERCIAL

## 2020-10-29 DIAGNOSIS — R53.83 OTHER FATIGUE: ICD-10-CM

## 2020-10-29 DIAGNOSIS — F41.9 ANXIETY: ICD-10-CM

## 2020-10-29 DIAGNOSIS — Z13.220 ENCOUNTER FOR LIPID SCREENING FOR CARDIOVASCULAR DISEASE: ICD-10-CM

## 2020-10-29 DIAGNOSIS — Z86.39 HISTORY OF LOW POTASSIUM: ICD-10-CM

## 2020-10-29 DIAGNOSIS — Z13.6 ENCOUNTER FOR LIPID SCREENING FOR CARDIOVASCULAR DISEASE: ICD-10-CM

## 2020-10-29 DIAGNOSIS — Z13.1 SCREENING FOR DIABETES MELLITUS (DM): ICD-10-CM

## 2020-10-29 DIAGNOSIS — E55.9 VITAMIN D DEFICIENCY: ICD-10-CM

## 2020-10-29 LAB
25(OH)D3 SERPL-MCNC: 46.2 NG/ML (ref 30–100)
ALBUMIN SERPL BCP-MCNC: 3.8 G/DL (ref 3.5–5)
ALP SERPL-CCNC: 48 U/L (ref 46–116)
ALT SERPL W P-5'-P-CCNC: 22 U/L (ref 12–78)
ANION GAP SERPL CALCULATED.3IONS-SCNC: 7 MMOL/L (ref 4–13)
AST SERPL W P-5'-P-CCNC: 17 U/L (ref 5–45)
BASOPHILS # BLD AUTO: 0.02 THOUSANDS/ΜL (ref 0–0.1)
BASOPHILS NFR BLD AUTO: 0 % (ref 0–1)
BILIRUB SERPL-MCNC: 0.56 MG/DL (ref 0.2–1)
BUN SERPL-MCNC: 15 MG/DL (ref 5–25)
CALCIUM SERPL-MCNC: 8.3 MG/DL (ref 8.3–10.1)
CHLORIDE SERPL-SCNC: 106 MMOL/L (ref 100–108)
CHOLEST SERPL-MCNC: 173 MG/DL (ref 50–200)
CO2 SERPL-SCNC: 28 MMOL/L (ref 21–32)
CREAT SERPL-MCNC: 0.75 MG/DL (ref 0.6–1.3)
EOSINOPHIL # BLD AUTO: 0.06 THOUSAND/ΜL (ref 0–0.61)
EOSINOPHIL NFR BLD AUTO: 1 % (ref 0–6)
ERYTHROCYTE [DISTWIDTH] IN BLOOD BY AUTOMATED COUNT: 13.3 % (ref 11.6–15.1)
GFR SERPL CREATININE-BSD FRML MDRD: 102 ML/MIN/1.73SQ M
GLUCOSE P FAST SERPL-MCNC: 95 MG/DL (ref 65–99)
HCT VFR BLD AUTO: 39.7 % (ref 34.8–46.1)
HDLC SERPL-MCNC: 67 MG/DL
HGB BLD-MCNC: 12.5 G/DL (ref 11.5–15.4)
IMM GRANULOCYTES # BLD AUTO: 0.01 THOUSAND/UL (ref 0–0.2)
IMM GRANULOCYTES NFR BLD AUTO: 0 % (ref 0–2)
LDLC SERPL CALC-MCNC: 89 MG/DL (ref 0–100)
LYMPHOCYTES # BLD AUTO: 2.53 THOUSANDS/ΜL (ref 0.6–4.47)
LYMPHOCYTES NFR BLD AUTO: 41 % (ref 14–44)
MCH RBC QN AUTO: 29.4 PG (ref 26.8–34.3)
MCHC RBC AUTO-ENTMCNC: 31.5 G/DL (ref 31.4–37.4)
MCV RBC AUTO: 93 FL (ref 82–98)
MONOCYTES # BLD AUTO: 0.27 THOUSAND/ΜL (ref 0.17–1.22)
MONOCYTES NFR BLD AUTO: 4 % (ref 4–12)
NEUTROPHILS # BLD AUTO: 3.23 THOUSANDS/ΜL (ref 1.85–7.62)
NEUTS SEG NFR BLD AUTO: 54 % (ref 43–75)
NRBC BLD AUTO-RTO: 0 /100 WBCS
PLATELET # BLD AUTO: 204 THOUSANDS/UL (ref 149–390)
PMV BLD AUTO: 10.8 FL (ref 8.9–12.7)
POTASSIUM SERPL-SCNC: 3.9 MMOL/L (ref 3.5–5.3)
PROT SERPL-MCNC: 7.5 G/DL (ref 6.4–8.2)
RBC # BLD AUTO: 4.25 MILLION/UL (ref 3.81–5.12)
SODIUM SERPL-SCNC: 141 MMOL/L (ref 136–145)
TRIGL SERPL-MCNC: 85 MG/DL
WBC # BLD AUTO: 6.12 THOUSAND/UL (ref 4.31–10.16)

## 2020-10-29 PROCEDURE — 85025 COMPLETE CBC W/AUTO DIFF WBC: CPT | Performed by: INTERNAL MEDICINE

## 2020-10-29 PROCEDURE — 80061 LIPID PANEL: CPT

## 2020-10-29 PROCEDURE — 36415 COLL VENOUS BLD VENIPUNCTURE: CPT | Performed by: INTERNAL MEDICINE

## 2020-10-29 PROCEDURE — 82306 VITAMIN D 25 HYDROXY: CPT

## 2020-10-29 PROCEDURE — 80053 COMPREHEN METABOLIC PANEL: CPT

## 2020-10-29 RX ORDER — NORETHINDRONE ACETATE AND ETHINYL ESTRADIOL AND FERROUS FUMARATE 1MG-20(21)
KIT ORAL
Qty: 84 TABLET | Refills: 0 | Status: SHIPPED | OUTPATIENT
Start: 2020-10-29 | End: 2021-01-18

## 2020-11-05 ENCOUNTER — OFFICE VISIT (OUTPATIENT)
Dept: INTERNAL MEDICINE CLINIC | Facility: CLINIC | Age: 35
End: 2020-11-05
Payer: COMMERCIAL

## 2020-11-05 VITALS
HEART RATE: 91 BPM | TEMPERATURE: 99.1 F | DIASTOLIC BLOOD PRESSURE: 78 MMHG | SYSTOLIC BLOOD PRESSURE: 122 MMHG | RESPIRATION RATE: 18 BRPM | WEIGHT: 154.2 LBS | BODY MASS INDEX: 24.2 KG/M2 | HEIGHT: 67 IN | OXYGEN SATURATION: 99 %

## 2020-11-05 DIAGNOSIS — Z23 FLU VACCINE NEED: ICD-10-CM

## 2020-11-05 DIAGNOSIS — Z00.00 WELLNESS EXAMINATION: Primary | ICD-10-CM

## 2020-11-05 DIAGNOSIS — F41.9 ANXIETY: ICD-10-CM

## 2020-11-05 DIAGNOSIS — R09.81 NASAL SINUS CONGESTION: ICD-10-CM

## 2020-11-05 DIAGNOSIS — L73.9 FOLLICULITIS: ICD-10-CM

## 2020-11-05 PROCEDURE — 90686 IIV4 VACC NO PRSV 0.5 ML IM: CPT

## 2020-11-05 PROCEDURE — 3725F SCREEN DEPRESSION PERFORMED: CPT | Performed by: INTERNAL MEDICINE

## 2020-11-05 PROCEDURE — 90471 IMMUNIZATION ADMIN: CPT

## 2020-11-05 PROCEDURE — 3008F BODY MASS INDEX DOCD: CPT | Performed by: INTERNAL MEDICINE

## 2020-11-05 PROCEDURE — 1036F TOBACCO NON-USER: CPT | Performed by: INTERNAL MEDICINE

## 2020-11-05 PROCEDURE — 99395 PREV VISIT EST AGE 18-39: CPT | Performed by: INTERNAL MEDICINE

## 2020-11-05 RX ORDER — CEPHALEXIN 500 MG/1
500 CAPSULE ORAL EVERY 12 HOURS SCHEDULED
Qty: 10 CAPSULE | Refills: 0 | Status: SHIPPED | OUTPATIENT
Start: 2020-11-05 | End: 2020-11-10

## 2020-11-17 ENCOUNTER — HOSPITAL ENCOUNTER (OUTPATIENT)
Dept: RADIOLOGY | Facility: HOSPITAL | Age: 35
Discharge: HOME | End: 2020-11-17
Payer: COMMERCIAL

## 2020-11-17 VITALS — WEIGHT: 155 LBS | HEIGHT: 67 IN | BODY MASS INDEX: 24.33 KG/M2

## 2020-11-17 DIAGNOSIS — Z80.3 FAMILY HISTORY OF MALIGNANT NEOPLASM OF BREAST: ICD-10-CM

## 2020-11-17 PROCEDURE — 77063 BREAST TOMOSYNTHESIS BI: CPT

## 2020-11-17 PROCEDURE — 77067 SCR MAMMO BI INCL CAD: CPT

## 2020-12-24 ENCOUNTER — TELEPHONE (OUTPATIENT)
Dept: OBGYN CLINIC | Facility: CLINIC | Age: 35
End: 2020-12-24

## 2021-01-04 DIAGNOSIS — F41.9 ANXIETY: ICD-10-CM

## 2021-01-04 RX ORDER — DULOXETIN HYDROCHLORIDE 20 MG/1
20 CAPSULE, DELAYED RELEASE ORAL
Qty: 90 CAPSULE | Refills: 0 | Status: SHIPPED | OUTPATIENT
Start: 2021-01-04 | End: 2021-06-09 | Stop reason: ALTCHOICE

## 2021-01-17 DIAGNOSIS — Z00.00 HEALTHCARE MAINTENANCE: ICD-10-CM

## 2021-01-18 RX ORDER — NORETHINDRONE ACETATE AND ETHINYL ESTRADIOL AND FERROUS FUMARATE 1MG-20(21)
KIT ORAL
Qty: 84 TABLET | Refills: 0 | Status: SHIPPED | OUTPATIENT
Start: 2021-01-18 | End: 2021-04-05

## 2021-03-04 ENCOUNTER — OFFICE VISIT (OUTPATIENT)
Dept: INTERNAL MEDICINE CLINIC | Facility: CLINIC | Age: 36
End: 2021-03-04
Payer: COMMERCIAL

## 2021-03-04 VITALS
HEIGHT: 67 IN | BODY MASS INDEX: 24.74 KG/M2 | SYSTOLIC BLOOD PRESSURE: 118 MMHG | HEART RATE: 87 BPM | OXYGEN SATURATION: 98 % | TEMPERATURE: 99.1 F | DIASTOLIC BLOOD PRESSURE: 80 MMHG | RESPIRATION RATE: 18 BRPM | WEIGHT: 157.6 LBS

## 2021-03-04 DIAGNOSIS — F41.9 ANXIETY: Primary | ICD-10-CM

## 2021-03-04 PROCEDURE — 3008F BODY MASS INDEX DOCD: CPT | Performed by: INTERNAL MEDICINE

## 2021-03-04 PROCEDURE — 1036F TOBACCO NON-USER: CPT | Performed by: INTERNAL MEDICINE

## 2021-03-04 PROCEDURE — 99213 OFFICE O/P EST LOW 20 MIN: CPT | Performed by: INTERNAL MEDICINE

## 2021-03-04 NOTE — PROGRESS NOTES
Assessment/Plan:     Diagnoses and all orders for this visit:    Anxiety  Comments:  Margie Espinosa requests to taper off duloxetine over next few weeks  instructions provided for taper to limit discontinuation symptoms  f/u in 4 mos or prn          Subjective:      Patient ID: Claudia Herman is a 38 y o  female  HPI    Here for follow up, Margie Espinosa is overall doing well  She is taking duloxetine as prescribed and better from the anxiety  She has no issues or side effects from duloxetine  She is taking OCP  She is trying to resume exercising more lately  She is UTD on her vaccines and other preventative care  We discussed staying on duloxetine and she would rather stop the medication in coming weeks  We discussed anti-depressant discontinuation symptoms that can occassionally occur  ROS otherwise negative, no other complaints  Past Medical History:   Diagnosis Date    Anemia     Female infertility     IVF pregnacny 2017, IVF 2014    Gestational hypertension, third trimester     resolved: May 12, 2015    Infertility, female     Migraine     Resolved: Nov 3, 2015    Ovarian cyst     removed 2005, 6lb cyst    Preeclampsia     Pregnancy resulting from in vitro fertilization in third trimester 4/6/2018    Seasonal allergies     Type AB blood, Rh positive     Varicella     had as child around 8years old     Visual impairment     eywear      Vitals:    03/04/21 0828 03/04/21 0900   BP: 132/74 118/80   Pulse: 87    Resp: 18    Temp: 99 1 °F (37 3 °C)    SpO2: 98%    Weight: 71 5 kg (157 lb 9 6 oz)    Height: 5' 7" (1 702 m)      Body mass index is 24 68 kg/m²      Current Outpatient Medications:     DULoxetine (CYMBALTA) 20 mg capsule, TAKE 1 CAPSULE (20 MG TOTAL) BY MOUTH DAILY AT BEDTIME, Disp: 90 capsule, Rfl: 0    hydroquinone 4 % cream, , Disp: , Rfl:     Junel FE 1/20 1-20 MG-MCG per tablet, TAKE 1 TABLET DAILY, Disp: 84 tablet, Rfl: 0    multivitamin (THERAGRAN) TABS, Take 1 tablet by mouth daily, Disp: , Rfl:     Cholecalciferol (VITAMIN D3 PO), Take 1,000 Units by mouth daily  , Disp: , Rfl:   Allergies   Allergen Reactions    Neosporin [Neomycin-Bacitracin Zn-Polymyx] Hives    Pollen Extract Allergic Rhinitis         Review of Systems   Constitutional: Negative for fever  Eyes: Negative for visual disturbance  Respiratory: Negative for shortness of breath  Cardiovascular: Negative for chest pain  Skin: Negative for rash  Allergic/Immunologic: Negative for immunocompromised state  Psychiatric/Behavioral: Negative for dysphoric mood  The patient is not nervous/anxious  Objective:      /80   Pulse 87   Temp 99 1 °F (37 3 °C)   Resp 18   Ht 5' 7" (1 702 m)   Wt 71 5 kg (157 lb 9 6 oz)   SpO2 98%   BMI 24 68 kg/m²          Physical Exam  Vitals signs reviewed  Constitutional:       Appearance: Normal appearance  HENT:      Head: Normocephalic and atraumatic  Right Ear: Tympanic membrane normal       Left Ear: Tympanic membrane normal    Cardiovascular:      Rate and Rhythm: Normal rate and regular rhythm  Heart sounds: No murmur  Pulmonary:      Effort: Pulmonary effort is normal       Breath sounds: No wheezing or rales  Abdominal:      General: Bowel sounds are normal       Palpations: Abdomen is soft  Tenderness: There is no abdominal tenderness  Musculoskeletal:      Right lower leg: No edema  Left lower leg: No edema  Neurological:      Mental Status: She is alert     Psychiatric:         Mood and Affect: Mood normal          Behavior: Behavior normal

## 2021-03-04 NOTE — PATIENT INSTRUCTIONS
1  Return to office in 4 months  2  Reduce dose to every other day for 3 weeks, then stop the medication

## 2021-04-04 DIAGNOSIS — Z00.00 HEALTHCARE MAINTENANCE: ICD-10-CM

## 2021-04-05 RX ORDER — NORETHINDRONE ACETATE AND ETHINYL ESTRADIOL AND FERROUS FUMARATE 1MG-20(21)
KIT ORAL
Qty: 84 TABLET | Refills: 0 | Status: SHIPPED | OUTPATIENT
Start: 2021-04-05 | End: 2021-06-09 | Stop reason: SDUPTHER

## 2021-04-13 DIAGNOSIS — Z23 ENCOUNTER FOR IMMUNIZATION: ICD-10-CM

## 2021-04-22 ENCOUNTER — IMMUNIZATIONS (OUTPATIENT)
Dept: FAMILY MEDICINE CLINIC | Facility: HOSPITAL | Age: 36
End: 2021-04-22

## 2021-04-22 DIAGNOSIS — Z23 ENCOUNTER FOR IMMUNIZATION: Primary | ICD-10-CM

## 2021-04-22 PROCEDURE — 91300 SARS-COV-2 / COVID-19 MRNA VACCINE (PFIZER-BIONTECH) 30 MCG: CPT | Performed by: NURSE PRACTITIONER

## 2021-04-22 PROCEDURE — 0001A SARS-COV-2 / COVID-19 MRNA VACCINE (PFIZER-BIONTECH) 30 MCG: CPT | Performed by: NURSE PRACTITIONER

## 2021-05-13 ENCOUNTER — IMMUNIZATIONS (OUTPATIENT)
Dept: FAMILY MEDICINE CLINIC | Facility: HOSPITAL | Age: 36
End: 2021-05-13

## 2021-05-13 DIAGNOSIS — Z23 ENCOUNTER FOR IMMUNIZATION: Primary | ICD-10-CM

## 2021-05-13 PROCEDURE — 0002A SARS-COV-2 / COVID-19 MRNA VACCINE (PFIZER-BIONTECH) 30 MCG: CPT

## 2021-05-13 PROCEDURE — 91300 SARS-COV-2 / COVID-19 MRNA VACCINE (PFIZER-BIONTECH) 30 MCG: CPT

## 2021-06-09 ENCOUNTER — ANNUAL EXAM (OUTPATIENT)
Dept: OBGYN CLINIC | Facility: CLINIC | Age: 36
End: 2021-06-09
Payer: COMMERCIAL

## 2021-06-09 VITALS
WEIGHT: 157 LBS | SYSTOLIC BLOOD PRESSURE: 118 MMHG | BODY MASS INDEX: 24.64 KG/M2 | HEIGHT: 67 IN | DIASTOLIC BLOOD PRESSURE: 80 MMHG

## 2021-06-09 DIAGNOSIS — Z00.00 HEALTHCARE MAINTENANCE: ICD-10-CM

## 2021-06-09 DIAGNOSIS — Z80.3 FAMILY HISTORY OF MALIGNANT NEOPLASM OF BREAST: ICD-10-CM

## 2021-06-09 DIAGNOSIS — Z12.31 ENCOUNTER FOR SCREENING MAMMOGRAM FOR MALIGNANT NEOPLASM OF BREAST: ICD-10-CM

## 2021-06-09 DIAGNOSIS — Z01.419 ENCOUNTER FOR GYNECOLOGICAL EXAMINATION WITHOUT ABNORMAL FINDING: Primary | ICD-10-CM

## 2021-06-09 PROCEDURE — 99395 PREV VISIT EST AGE 18-39: CPT | Performed by: OBSTETRICS & GYNECOLOGY

## 2021-06-09 PROCEDURE — 3008F BODY MASS INDEX DOCD: CPT | Performed by: OBSTETRICS & GYNECOLOGY

## 2021-06-09 PROCEDURE — 1036F TOBACCO NON-USER: CPT | Performed by: OBSTETRICS & GYNECOLOGY

## 2021-06-09 RX ORDER — NORETHINDRONE ACETATE AND ETHINYL ESTRADIOL 1MG-20(21)
1 KIT ORAL DAILY
Qty: 84 TABLET | Refills: 3 | Status: SHIPPED | OUTPATIENT
Start: 2021-06-09 | End: 2022-05-15

## 2021-06-09 NOTE — PROGRESS NOTES
K[place]  1985      CC:  Yearly exam    S:  38 y o  female here for yearly exam  Her cycles are regular, not heavy or crampy  She didn't have a period for a couple of cycles but is now back to having light menses  Sexual activity: She is sexually active without pain, bleeding or dryness  Contraception: She uses BCP for contraception  Last Pap 12/18/2018 - normal/negative HPV  Last Mammo 11/17/2020 - BIRAD-1    [facility]       Family hx of breast cancer: maternal twin sister  Family hx of ovarian cancer: no  Family hx of colon cancer: no      Current Outpatient Medications:     Junel FE 1/20 1-20 MG-MCG per tablet, TAKE 1 TABLET DAILY, Disp: 84 tablet, Rfl: 0    multivitamin (THERAGRAN) TABS, Take 1 tablet by mouth daily, Disp: , Rfl:   Social History     Socioeconomic History    Marital status: /Civil Union     Spouse name: Not on file    Number of children: 1    Years of education: Not on file    Highest education level: Not on file   Occupational History    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[place] [address] resource strain: Not on file    Food insecurity     Worry: Not on file     Inability: Not on file   [employer] needs     Medical: Not on file     Non-medical: Not on file   Tobacco Use    Smoking status: Never Smoker    Smokeless tobacco: Never Used   Substance and Sexual Activity    Alcohol use: Yes     Frequency: 2-3 times a week     Drinks per session: 1 or 2     Binge frequency: Never     Comment: socially    Drug use: No    Sexual activity: Yes     Partners: Male     Birth control/protection: OCP   Lifestyle    Physical activity     Days per week: Not on file     Minutes per session: Not on file    Stress: Not on file   Relationships    Social connections     Talks on phone: Not on file     Gets together: Not on file     Attends Synagogue service: Not on file     Active member of club or organization: Not on file     Attends meetings of clubs or organizations: Not on file     Relationship status: Not on file    Intimate partner violence     Fear of current or ex partner: Not on file     Emotionally abused: Not on file     Physically abused: Not on file     Forced sexual activity: Not on file   Other Topics Concern    Not on file   Social History Narrative    Always wears seat belt     Daily caffeinated coffee consumption     Tea      Family History   Problem Relation Age of Onset    Varicose Veins Mother     Cirrhosis Mother     Alcohol abuse Mother     Hyperlipidemia Father     Arthritis Maternal Grandmother     Hypertension Maternal Grandfather     Cancer Paternal Grandfather         lung    Breast cancer Maternal Aunt 36    Cancer Maternal Aunt     Substance Abuse Neg Hx     Mental illness Neg Hx     Depression Neg Hx       Past Medical History:   Diagnosis Date    Anemia     Female infertility     IVF pregnacny 2017, IVF 2014    Gestational hypertension, third trimester     resolved: May 12, 2015    Infertility, female     Migraine     Resolved: Nov 3, 2015    Ovarian cyst     removed 2005, 6lb cyst    Preeclampsia     Pregnancy resulting from in vitro fertilization in third trimester 4/6/2018    Seasonal allergies     Type AB blood, Rh positive     Varicella     had as child around 8years old     Visual impairment     eywear         Review of Systems   Respiratory: Negative  Cardiovascular: Negative  Gastrointestinal: Negative for constipation and diarrhea  Genitourinary: Negative for difficulty urinating, pelvic pain, vaginal bleeding, vaginal discharge, itching or odor  O:  Blood pressure 118/80, height 5' 6 54" (1 69 m), weight 71 2 kg (157 lb), last menstrual period 05/26/2021, not currently breastfeeding      Patient appears well and is not in distress  Neck is supple without masses  Breasts are symmetrical without mass, tenderness, nipple discharge, skin changes or adenopathy  Abdomen is soft and nontender without masses  External genitals are normal without lesions or rashes  Urethral meatus and urethra are normal  Bladder is normal to palpation  Vagina is normal without discharge or bleeding  Cervix is normal without discharge or lesion  Uterus is normal, mobile, nontender without palpable mass  Adnexa are normal, nontender, without palpable mass  A:  Yearly exam      P:   Pap due 2023   Mammo slip provided    BCP refill sent   RTO one year for yearly exam or sooner as needed

## 2021-06-29 ENCOUNTER — OFFICE VISIT (OUTPATIENT)
Dept: INTERNAL MEDICINE CLINIC | Facility: CLINIC | Age: 36
End: 2021-06-29
Payer: COMMERCIAL

## 2021-06-29 VITALS
TEMPERATURE: 98.5 F | HEART RATE: 68 BPM | RESPIRATION RATE: 16 BRPM | OXYGEN SATURATION: 98 % | WEIGHT: 156 LBS | SYSTOLIC BLOOD PRESSURE: 116 MMHG | DIASTOLIC BLOOD PRESSURE: 80 MMHG | HEIGHT: 67 IN | BODY MASS INDEX: 24.48 KG/M2

## 2021-06-29 DIAGNOSIS — Z13.220 ENCOUNTER FOR LIPID SCREENING FOR CARDIOVASCULAR DISEASE: ICD-10-CM

## 2021-06-29 DIAGNOSIS — R19.4 CHANGE IN BOWEL HABITS: Primary | ICD-10-CM

## 2021-06-29 DIAGNOSIS — Z86.59 HISTORY OF ANXIETY: ICD-10-CM

## 2021-06-29 DIAGNOSIS — Z13.6 ENCOUNTER FOR LIPID SCREENING FOR CARDIOVASCULAR DISEASE: ICD-10-CM

## 2021-06-29 PROCEDURE — 3008F BODY MASS INDEX DOCD: CPT | Performed by: INTERNAL MEDICINE

## 2021-06-29 PROCEDURE — 1036F TOBACCO NON-USER: CPT | Performed by: INTERNAL MEDICINE

## 2021-06-29 PROCEDURE — 99213 OFFICE O/P EST LOW 20 MIN: CPT | Performed by: INTERNAL MEDICINE

## 2021-06-29 PROCEDURE — 3725F SCREEN DEPRESSION PERFORMED: CPT | Performed by: INTERNAL MEDICINE

## 2021-06-29 RX ORDER — DICYCLOMINE HYDROCHLORIDE 10 MG/1
10 CAPSULE ORAL EVERY 8 HOURS PRN
Qty: 30 CAPSULE | Refills: 1 | Status: SHIPPED | OUTPATIENT
Start: 2021-06-29 | End: 2022-02-22 | Stop reason: HOSPADM

## 2021-06-29 NOTE — PROGRESS NOTES
Assessment/Plan:     Diagnoses and all orders for this visit:    Change in bowel habits  Comments:  etiology of symptoms unclear, suspicious for IBS vs other causes of change in bowels  advised on a trial of bentyl or probiotic to see if helps(see below)  Orders:  -     dicyclomine (BENTYL) 10 mg capsule; Take 1 capsule (10 mg total) by mouth every 8 (eight) hours as needed (abd spasms/cramping)  -     Comprehensive metabolic panel; Future    History of anxiety  Comments:  feels better, continue off SNRI at this time  f/u in 5 mos or prn    Encounter for lipid screening for cardiovascular disease  -     Lipid Panel with Direct LDL reflex; Future      advised patient that if symptoms persist, she would need to see a GI specialist to r/o other medical causes of change in bowels as IBS is a dx of exclusion, patient aware and will contact office if not improved    Subjective:      Patient ID: Leeann Veras is a 38 y o  female  HPI    Here for follow up, Luis Alfredo Britt weaned off duloxetine and reports feeling overall well  She is not exercising regularly but is looking to resume this  She is taking care of 3 children and going to [place] to visit family with kids this summer  She is UTD on COVID-19 vaccine  She feels well off duloxetine and no longer feels anxious  She has been having abd spasms/cramping off/on over recent weeks with associated diarrhea and sometimes constipation  She denies blood in stool, abd pain, fever, appetite changes or N/V  She has tried anti-diarrhea medication OTC which has helped a bit  She gets an occasional sharp, shooting pain on right side that lasts for a moment and then goes away  ROS Otherwise negative, no other complaints  Past Medical History:   Diagnosis Date    Anemia     Female infertility     IVF pregnacny 2017, IVF 2014    Gestational hypertension, third trimester     resolved:  May 12, 2015    Infertility, female     Migraine     Resolved: Nov 3, 2015    Ovarian cyst removed 2005, 6lb cyst    Preeclampsia     Pregnancy resulting from in vitro fertilization in third trimester 4/6/2018    Seasonal allergies     Type AB blood, Rh positive     Varicella     had as child around 8years old     Visual impairment     eywear      Vitals:    06/29/21 1328   BP: 116/80   Pulse: 68   Resp: 16   Temp: 98 5 °F (36 9 °C)   SpO2: 98%   Weight: 70 8 kg (156 lb)   Height: 5' 6 54" (1 69 m)     Body mass index is 24 77 kg/m²  Current Outpatient Medications:     multivitamin (THERAGRAN) TABS, Take 1 tablet by mouth daily, Disp: , Rfl:     norethindrone-ethinyl estradiol (Junel FE 1/20) 1-20 MG-MCG per tablet, Take 1 tablet by mouth daily, Disp: 84 tablet, Rfl: 3    dicyclomine (BENTYL) 10 mg capsule, Take 1 capsule (10 mg total) by mouth every 8 (eight) hours as needed (abd spasms/cramping), Disp: 30 capsule, Rfl: 1  Allergies   Allergen Reactions    Neosporin [Neomycin-Bacitracin Zn-Polymyx] Hives    Pollen Extract Allergic Rhinitis         Review of Systems   Constitutional: Negative for fever  HENT: Negative for congestion  Eyes: Negative for visual disturbance  Respiratory: Negative for shortness of breath  Cardiovascular: Negative for chest pain  Gastrointestinal: Positive for constipation and diarrhea  Negative for abdominal pain, blood in stool, nausea and vomiting  Endocrine: Negative for polyuria  Genitourinary: Negative for difficulty urinating  Musculoskeletal: Positive for arthralgias (see HPI)  Skin: Negative for rash  Allergic/Immunologic: Negative for immunocompromised state  Neurological: Negative for weakness  Psychiatric/Behavioral: Negative for dysphoric mood  The patient is not nervous/anxious  Objective:      /80   Pulse 68   Temp 98 5 °F (36 9 °C)   Resp 16   Ht 5' 6 54" (1 69 m)   Wt 70 8 kg (156 lb)   SpO2 98%   BMI 24 77 kg/m²          Physical Exam  Vitals reviewed     Constitutional:       Appearance: Normal appearance  HENT:      Head: Normocephalic and atraumatic  Right Ear: Tympanic membrane normal       Left Ear: Tympanic membrane normal    Cardiovascular:      Rate and Rhythm: Normal rate and regular rhythm  Heart sounds: No murmur heard  Pulmonary:      Effort: Pulmonary effort is normal       Breath sounds: No wheezing or rales  Abdominal:      General: Bowel sounds are normal       Palpations: Abdomen is soft  Tenderness: There is no abdominal tenderness  There is no guarding or rebound  Negative signs include Washington's sign  Hernia: There is no hernia in the ventral area  Musculoskeletal:      Right lower leg: No edema  Left lower leg: No edema  Neurological:      Mental Status: She is alert  Psychiatric:         Mood and Affect: Mood is anxious (mild)           Behavior: Behavior normal

## 2021-06-29 NOTE — PATIENT INSTRUCTIONS
1  Maintain adequate fiber in the diet  2  Dicyclomine as needed  3  If the above, not helping try a probiotic such as align or florastor over the counter  4  Call if not improving for GI specialist referral  5   Return in November

## 2021-11-06 ENCOUNTER — HOSPITAL ENCOUNTER (OUTPATIENT)
Dept: RADIOLOGY | Facility: HOSPITAL | Age: 36
Discharge: HOME | End: 2021-11-06
Payer: COMMERCIAL

## 2021-11-06 DIAGNOSIS — G57.50 TARSAL TUNNEL SYNDROME: ICD-10-CM

## 2021-11-06 PROCEDURE — G1004 CDSM NDSC: HCPCS

## 2021-11-06 PROCEDURE — 73721 MRI JNT OF LWR EXTRE W/O DYE: CPT

## 2021-11-10 ENCOUNTER — APPOINTMENT (OUTPATIENT)
Dept: LAB | Facility: CLINIC | Age: 36
End: 2021-11-10
Payer: COMMERCIAL

## 2021-11-10 DIAGNOSIS — R19.4 CHANGE IN BOWEL HABITS: ICD-10-CM

## 2021-11-10 DIAGNOSIS — Z13.6 ENCOUNTER FOR LIPID SCREENING FOR CARDIOVASCULAR DISEASE: ICD-10-CM

## 2021-11-10 DIAGNOSIS — Z13.220 ENCOUNTER FOR LIPID SCREENING FOR CARDIOVASCULAR DISEASE: ICD-10-CM

## 2021-11-10 LAB
ALBUMIN SERPL BCP-MCNC: 3.8 G/DL (ref 3.5–5)
ALP SERPL-CCNC: 46 U/L (ref 46–116)
ALT SERPL W P-5'-P-CCNC: 35 U/L (ref 12–78)
ANION GAP SERPL CALCULATED.3IONS-SCNC: 7 MMOL/L (ref 4–13)
AST SERPL W P-5'-P-CCNC: 30 U/L (ref 5–45)
BILIRUB SERPL-MCNC: 0.52 MG/DL (ref 0.2–1)
BUN SERPL-MCNC: 13 MG/DL (ref 5–25)
CALCIUM SERPL-MCNC: 8.8 MG/DL (ref 8.3–10.1)
CHLORIDE SERPL-SCNC: 107 MMOL/L (ref 100–108)
CHOLEST SERPL-MCNC: 185 MG/DL (ref 50–200)
CO2 SERPL-SCNC: 28 MMOL/L (ref 21–32)
CREAT SERPL-MCNC: 0.83 MG/DL (ref 0.6–1.3)
GFR SERPL CREATININE-BSD FRML MDRD: 90 ML/MIN/1.73SQ M
GLUCOSE P FAST SERPL-MCNC: 98 MG/DL (ref 65–99)
HDLC SERPL-MCNC: 62 MG/DL
LDLC SERPL CALC-MCNC: 102 MG/DL (ref 0–100)
POTASSIUM SERPL-SCNC: 3.9 MMOL/L (ref 3.5–5.3)
PROT SERPL-MCNC: 7.2 G/DL (ref 6.4–8.2)
SODIUM SERPL-SCNC: 142 MMOL/L (ref 136–145)
TRIGL SERPL-MCNC: 106 MG/DL

## 2021-11-10 PROCEDURE — 36415 COLL VENOUS BLD VENIPUNCTURE: CPT

## 2021-11-10 PROCEDURE — 80053 COMPREHEN METABOLIC PANEL: CPT

## 2021-11-10 PROCEDURE — 80061 LIPID PANEL: CPT

## 2021-11-15 ENCOUNTER — HOSPITAL ENCOUNTER (OUTPATIENT)
Dept: ULTRASOUND IMAGING | Facility: HOSPITAL | Age: 36
Discharge: HOME | End: 2021-11-15
Payer: COMMERCIAL

## 2021-11-15 ENCOUNTER — TELEPHONE (OUTPATIENT)
Dept: INTERNAL MEDICINE CLINIC | Facility: CLINIC | Age: 36
End: 2021-11-15

## 2021-11-15 ENCOUNTER — OFFICE VISIT (OUTPATIENT)
Dept: INTERNAL MEDICINE CLINIC | Facility: CLINIC | Age: 36
End: 2021-11-15
Payer: COMMERCIAL

## 2021-11-15 VITALS
DIASTOLIC BLOOD PRESSURE: 72 MMHG | TEMPERATURE: 98.5 F | BODY MASS INDEX: 24.77 KG/M2 | OXYGEN SATURATION: 99 % | WEIGHT: 157.8 LBS | SYSTOLIC BLOOD PRESSURE: 122 MMHG | RESPIRATION RATE: 16 BRPM | HEART RATE: 68 BPM | HEIGHT: 67 IN

## 2021-11-15 DIAGNOSIS — R10.9 RIGHT SIDED ABDOMINAL PAIN: ICD-10-CM

## 2021-11-15 DIAGNOSIS — R10.816 EPIGASTRIC ABDOMINAL TENDERNESS, REBOUND TENDERNESS PRESENCE NOT SPECIFIED: ICD-10-CM

## 2021-11-15 DIAGNOSIS — Z23 FLU VACCINE NEED: ICD-10-CM

## 2021-11-15 DIAGNOSIS — Z86.59 HISTORY OF ANXIETY: ICD-10-CM

## 2021-11-15 DIAGNOSIS — R19.4 CHANGE IN BOWEL HABITS: Primary | ICD-10-CM

## 2021-11-15 DIAGNOSIS — N28.9 LESION OF RIGHT NATIVE KIDNEY: Primary | ICD-10-CM

## 2021-11-15 PROCEDURE — 99213 OFFICE O/P EST LOW 20 MIN: CPT | Performed by: INTERNAL MEDICINE

## 2021-11-15 PROCEDURE — 90471 IMMUNIZATION ADMIN: CPT

## 2021-11-15 PROCEDURE — 76705 ECHO EXAM OF ABDOMEN: CPT

## 2021-11-15 PROCEDURE — 90686 IIV4 VACC NO PRSV 0.5 ML IM: CPT

## 2021-11-15 RX ORDER — PANTOPRAZOLE SODIUM 20 MG/1
20 TABLET, DELAYED RELEASE ORAL
Qty: 30 TABLET | Refills: 1 | Status: SHIPPED | OUTPATIENT
Start: 2021-11-15 | End: 2021-12-07

## 2021-11-17 ENCOUNTER — CONSULT (OUTPATIENT)
Dept: GASTROENTEROLOGY | Facility: AMBULARY SURGERY CENTER | Age: 36
End: 2021-11-17
Payer: COMMERCIAL

## 2021-11-17 VITALS
WEIGHT: 156 LBS | HEIGHT: 67 IN | BODY MASS INDEX: 24.48 KG/M2 | SYSTOLIC BLOOD PRESSURE: 118 MMHG | DIASTOLIC BLOOD PRESSURE: 78 MMHG

## 2021-11-17 DIAGNOSIS — R10.9 RIGHT SIDED ABDOMINAL PAIN: ICD-10-CM

## 2021-11-17 DIAGNOSIS — R10.816 EPIGASTRIC ABDOMINAL TENDERNESS, REBOUND TENDERNESS PRESENCE NOT SPECIFIED: ICD-10-CM

## 2021-11-17 DIAGNOSIS — R19.4 CHANGE IN BOWEL HABITS: ICD-10-CM

## 2021-11-17 PROCEDURE — 99204 OFFICE O/P NEW MOD 45 MIN: CPT | Performed by: INTERNAL MEDICINE

## 2021-11-17 PROCEDURE — 3008F BODY MASS INDEX DOCD: CPT | Performed by: INTERNAL MEDICINE

## 2021-11-17 PROCEDURE — 1036F TOBACCO NON-USER: CPT | Performed by: INTERNAL MEDICINE

## 2021-11-18 ENCOUNTER — HOSPITAL ENCOUNTER (OUTPATIENT)
Dept: RADIOLOGY | Facility: HOSPITAL | Age: 36
Discharge: HOME | End: 2021-11-18
Payer: COMMERCIAL

## 2021-11-18 VITALS — WEIGHT: 156 LBS | BODY MASS INDEX: 25.07 KG/M2 | HEIGHT: 66 IN

## 2021-11-18 DIAGNOSIS — Z80.3 FAMILY HISTORY OF MALIGNANT NEOPLASM OF BREAST: ICD-10-CM

## 2021-11-18 DIAGNOSIS — Z12.31 ENCOUNTER FOR SCREENING MAMMOGRAM FOR MALIGNANT NEOPLASM OF BREAST: ICD-10-CM

## 2021-11-18 PROBLEM — R10.9 RIGHT SIDED ABDOMINAL PAIN: Status: ACTIVE | Noted: 2021-11-18

## 2021-11-18 PROBLEM — R19.4 CHANGE IN BOWEL HABITS: Status: ACTIVE | Noted: 2021-11-18

## 2021-11-18 PROBLEM — R10.816 EPIGASTRIC ABDOMINAL TENDERNESS: Status: ACTIVE | Noted: 2021-11-18

## 2021-11-18 PROCEDURE — 77067 SCR MAMMO BI INCL CAD: CPT

## 2021-11-18 PROCEDURE — 77063 BREAST TOMOSYNTHESIS BI: CPT

## 2021-11-18 RX ORDER — SODIUM PICOSULFATE, MAGNESIUM OXIDE, AND ANHYDROUS CITRIC ACID 10; 3.5; 12 MG/160ML; G/160ML; G/160ML
LIQUID ORAL
Qty: 320 ML | Refills: 0 | Status: SHIPPED | OUTPATIENT
Start: 2021-11-18 | End: 2022-02-09 | Stop reason: ALTCHOICE

## 2021-11-23 ENCOUNTER — HOSPITAL ENCOUNTER (OUTPATIENT)
Dept: RADIOLOGY | Facility: HOSPITAL | Age: 36
Discharge: HOME | End: 2021-11-23
Payer: COMMERCIAL

## 2021-11-23 DIAGNOSIS — N28.9 LESION OF RIGHT NATIVE KIDNEY: ICD-10-CM

## 2021-11-23 PROCEDURE — G1004 CDSM NDSC: HCPCS

## 2021-11-23 PROCEDURE — 74181 MRI ABDOMEN W/O CONTRAST: CPT

## 2021-11-29 ENCOUNTER — TELEPHONE (OUTPATIENT)
Dept: INTERNAL MEDICINE CLINIC | Facility: CLINIC | Age: 36
End: 2021-11-29

## 2021-11-29 ENCOUNTER — HOSPITAL ENCOUNTER (OUTPATIENT)
Dept: RADIOLOGY | Facility: HOSPITAL | Age: 36
Discharge: HOME | End: 2021-11-29
Payer: COMMERCIAL

## 2021-11-29 DIAGNOSIS — N28.9 KIDNEY LESION, NATIVE, RIGHT: ICD-10-CM

## 2021-11-29 DIAGNOSIS — N28.9 KIDNEY LESION, NATIVE, RIGHT: Primary | ICD-10-CM

## 2021-11-29 PROCEDURE — 74178 CT ABD&PLV WO CNTR FLWD CNTR: CPT

## 2021-11-29 PROCEDURE — G1004 CDSM NDSC: HCPCS

## 2021-11-29 RX ADMIN — IOHEXOL 120 ML: 350 INJECTION, SOLUTION INTRAVENOUS at 17:20

## 2021-11-30 ENCOUNTER — TELEPHONE (OUTPATIENT)
Dept: INTERNAL MEDICINE CLINIC | Facility: CLINIC | Age: 36
End: 2021-11-30

## 2021-11-30 DIAGNOSIS — N28.9 KIDNEY LESION, NATIVE, RIGHT: Primary | ICD-10-CM

## 2021-12-02 ENCOUNTER — TELEPHONE (OUTPATIENT)
Dept: UROLOGY | Facility: AMBULATORY SURGERY CENTER | Age: 36
End: 2021-12-02

## 2021-12-02 PROCEDURE — U0005 INFEC AGEN DETEC AMPLI PROBE: HCPCS | Performed by: INTERNAL MEDICINE

## 2021-12-02 PROCEDURE — U0003 INFECTIOUS AGENT DETECTION BY NUCLEIC ACID (DNA OR RNA); SEVERE ACUTE RESPIRATORY SYNDROME CORONAVIRUS 2 (SARS-COV-2) (CORONAVIRUS DISEASE [COVID-19]), AMPLIFIED PROBE TECHNIQUE, MAKING USE OF HIGH THROUGHPUT TECHNOLOGIES AS DESCRIBED BY CMS-2020-01-R: HCPCS | Performed by: INTERNAL MEDICINE

## 2021-12-03 ENCOUNTER — HOSPITAL ENCOUNTER (OUTPATIENT)
Dept: RADIOLOGY | Facility: HOSPITAL | Age: 36
Discharge: HOME | End: 2021-12-03

## 2021-12-03 ENCOUNTER — TELEPHONE (OUTPATIENT)
Dept: RADIOLOGY | Facility: HOSPITAL | Age: 36
End: 2021-12-03

## 2021-12-06 ENCOUNTER — TELEPHONE (OUTPATIENT)
Dept: INTERNAL MEDICINE CLINIC | Facility: CLINIC | Age: 36
End: 2021-12-06

## 2021-12-07 DIAGNOSIS — R10.816 EPIGASTRIC ABDOMINAL TENDERNESS, REBOUND TENDERNESS PRESENCE NOT SPECIFIED: ICD-10-CM

## 2021-12-07 DIAGNOSIS — R10.9 RIGHT SIDED ABDOMINAL PAIN: ICD-10-CM

## 2021-12-07 RX ORDER — PANTOPRAZOLE SODIUM 20 MG/1
TABLET, DELAYED RELEASE ORAL
Qty: 30 TABLET | Refills: 1 | Status: SHIPPED | OUTPATIENT
Start: 2021-12-07 | End: 2021-12-22 | Stop reason: SDUPTHER

## 2021-12-13 PROBLEM — N28.89 RENAL MASS, RIGHT: Status: ACTIVE | Noted: 2021-12-13

## 2021-12-14 ENCOUNTER — CONSULT (OUTPATIENT)
Dept: UROLOGY | Facility: CLINIC | Age: 36
End: 2021-12-14
Payer: COMMERCIAL

## 2021-12-14 VITALS
SYSTOLIC BLOOD PRESSURE: 122 MMHG | BODY MASS INDEX: 24.59 KG/M2 | DIASTOLIC BLOOD PRESSURE: 88 MMHG | HEIGHT: 66 IN | WEIGHT: 153 LBS

## 2021-12-14 DIAGNOSIS — Z86.59 HISTORY OF ANXIETY: ICD-10-CM

## 2021-12-14 DIAGNOSIS — N28.89 RENAL MASS, RIGHT: ICD-10-CM

## 2021-12-14 DIAGNOSIS — R10.9 RIGHT SIDED ABDOMINAL PAIN: Primary | ICD-10-CM

## 2021-12-14 PROCEDURE — 99205 OFFICE O/P NEW HI 60 MIN: CPT | Performed by: UROLOGY

## 2021-12-14 RX ORDER — GABAPENTIN 100 MG/1
300 CAPSULE ORAL ONCE
Status: CANCELLED | OUTPATIENT
Start: 2021-12-14 | End: 2021-12-14

## 2021-12-14 RX ORDER — ACETAMINOPHEN 325 MG/1
975 TABLET ORAL ONCE
Status: CANCELLED | OUTPATIENT
Start: 2021-12-14 | End: 2021-12-14

## 2021-12-16 ENCOUNTER — PREP FOR PROCEDURE (OUTPATIENT)
Dept: UROLOGY | Facility: CLINIC | Age: 36
End: 2021-12-16

## 2021-12-16 ENCOUNTER — TELEPHONE (OUTPATIENT)
Dept: UROLOGY | Facility: CLINIC | Age: 36
End: 2021-12-16

## 2021-12-16 DIAGNOSIS — N28.89 RENAL MASS, RIGHT: Primary | ICD-10-CM

## 2021-12-17 ENCOUNTER — HOSPITAL ENCOUNTER (OUTPATIENT)
Dept: RADIOLOGY | Facility: HOSPITAL | Age: 36
Discharge: HOME | End: 2021-12-17
Payer: COMMERCIAL

## 2021-12-17 DIAGNOSIS — R92.8 ABNORMAL SCREENING MAMMOGRAM: ICD-10-CM

## 2021-12-17 PROCEDURE — 76642 ULTRASOUND BREAST LIMITED: CPT

## 2021-12-17 PROCEDURE — G0279 TOMOSYNTHESIS, MAMMO: HCPCS

## 2021-12-17 PROCEDURE — 77065 DX MAMMO INCL CAD UNI: CPT

## 2021-12-22 ENCOUNTER — OFFICE VISIT (OUTPATIENT)
Dept: INTERNAL MEDICINE CLINIC | Facility: CLINIC | Age: 36
End: 2021-12-22
Payer: COMMERCIAL

## 2021-12-22 VITALS
TEMPERATURE: 98.5 F | DIASTOLIC BLOOD PRESSURE: 62 MMHG | OXYGEN SATURATION: 98 % | HEIGHT: 66 IN | SYSTOLIC BLOOD PRESSURE: 102 MMHG | WEIGHT: 156.3 LBS | BODY MASS INDEX: 25.12 KG/M2 | RESPIRATION RATE: 15 BRPM | HEART RATE: 68 BPM

## 2021-12-22 DIAGNOSIS — Z86.16 HISTORY OF 2019 NOVEL CORONAVIRUS DISEASE (COVID-19): ICD-10-CM

## 2021-12-22 DIAGNOSIS — R10.816 EPIGASTRIC ABDOMINAL TENDERNESS, REBOUND TENDERNESS PRESENCE NOT SPECIFIED: Primary | ICD-10-CM

## 2021-12-22 DIAGNOSIS — Z86.59 HISTORY OF ANXIETY: ICD-10-CM

## 2021-12-22 PROCEDURE — 99213 OFFICE O/P EST LOW 20 MIN: CPT | Performed by: INTERNAL MEDICINE

## 2021-12-22 PROCEDURE — 1036F TOBACCO NON-USER: CPT | Performed by: INTERNAL MEDICINE

## 2021-12-22 PROCEDURE — 3008F BODY MASS INDEX DOCD: CPT | Performed by: INTERNAL MEDICINE

## 2021-12-22 RX ORDER — PANTOPRAZOLE SODIUM 20 MG/1
20 TABLET, DELAYED RELEASE ORAL
Qty: 30 TABLET | Refills: 1 | Status: SHIPPED | OUTPATIENT
Start: 2021-12-22 | End: 2022-05-11

## 2022-01-31 ENCOUNTER — APPOINTMENT (OUTPATIENT)
Dept: LAB | Facility: CLINIC | Age: 37
End: 2022-01-31
Payer: COMMERCIAL

## 2022-01-31 DIAGNOSIS — N28.89 URETERAL FISTULA: ICD-10-CM

## 2022-01-31 DIAGNOSIS — N28.89 RENAL MASS, RIGHT: ICD-10-CM

## 2022-01-31 DIAGNOSIS — R19.4 CHANGE IN BOWEL HABITS: ICD-10-CM

## 2022-01-31 DIAGNOSIS — R10.816 EPIGASTRIC ABDOMINAL TENDERNESS, REBOUND TENDERNESS PRESENCE NOT SPECIFIED: ICD-10-CM

## 2022-01-31 DIAGNOSIS — R10.9 RIGHT SIDED ABDOMINAL PAIN: ICD-10-CM

## 2022-01-31 LAB
ABO GROUP BLD: NORMAL
ANION GAP SERPL CALCULATED.3IONS-SCNC: 8 MMOL/L (ref 4–13)
APTT PPP: 24 SECONDS (ref 23–37)
BASOPHILS # BLD AUTO: 0.03 THOUSANDS/ΜL (ref 0–0.1)
BASOPHILS NFR BLD AUTO: 1 % (ref 0–1)
BLD GP AB SCN SERPL QL: NEGATIVE
BUN SERPL-MCNC: 15 MG/DL (ref 5–25)
CALCIUM SERPL-MCNC: 9.3 MG/DL (ref 8.3–10.1)
CHLORIDE SERPL-SCNC: 105 MMOL/L (ref 100–108)
CO2 SERPL-SCNC: 28 MMOL/L (ref 21–32)
CREAT SERPL-MCNC: 0.83 MG/DL (ref 0.6–1.3)
CRP SERPL QL: 4.6 MG/L
EOSINOPHIL # BLD AUTO: 0.06 THOUSAND/ΜL (ref 0–0.61)
EOSINOPHIL NFR BLD AUTO: 1 % (ref 0–6)
ERYTHROCYTE [DISTWIDTH] IN BLOOD BY AUTOMATED COUNT: 13.2 % (ref 11.6–15.1)
ERYTHROCYTE [SEDIMENTATION RATE] IN BLOOD: 15 MM/HOUR (ref 0–19)
GFR SERPL CREATININE-BSD FRML MDRD: 89 ML/MIN/1.73SQ M
GLUCOSE P FAST SERPL-MCNC: 102 MG/DL (ref 65–99)
HCT VFR BLD AUTO: 41.1 % (ref 34.8–46.1)
HGB BLD-MCNC: 13.5 G/DL (ref 11.5–15.4)
IMM GRANULOCYTES # BLD AUTO: 0.01 THOUSAND/UL (ref 0–0.2)
IMM GRANULOCYTES NFR BLD AUTO: 0 % (ref 0–2)
INR PPP: 0.92 (ref 0.84–1.19)
LYMPHOCYTES # BLD AUTO: 2.35 THOUSANDS/ΜL (ref 0.6–4.47)
LYMPHOCYTES NFR BLD AUTO: 36 % (ref 14–44)
MCH RBC QN AUTO: 29.7 PG (ref 26.8–34.3)
MCHC RBC AUTO-ENTMCNC: 32.8 G/DL (ref 31.4–37.4)
MCV RBC AUTO: 91 FL (ref 82–98)
MONOCYTES # BLD AUTO: 0.25 THOUSAND/ΜL (ref 0.17–1.22)
MONOCYTES NFR BLD AUTO: 4 % (ref 4–12)
NEUTROPHILS # BLD AUTO: 3.88 THOUSANDS/ΜL (ref 1.85–7.62)
NEUTS SEG NFR BLD AUTO: 58 % (ref 43–75)
NRBC BLD AUTO-RTO: 0 /100 WBCS
PLATELET # BLD AUTO: 223 THOUSANDS/UL (ref 149–390)
PMV BLD AUTO: 10.5 FL (ref 8.9–12.7)
POTASSIUM SERPL-SCNC: 3.7 MMOL/L (ref 3.5–5.3)
PROTHROMBIN TIME: 12.3 SECONDS (ref 11.6–14.5)
RBC # BLD AUTO: 4.54 MILLION/UL (ref 3.81–5.12)
RH BLD: POSITIVE
SODIUM SERPL-SCNC: 141 MMOL/L (ref 136–145)
SPECIMEN EXPIRATION DATE: NORMAL
WBC # BLD AUTO: 6.58 THOUSAND/UL (ref 4.31–10.16)

## 2022-01-31 PROCEDURE — 86900 BLOOD TYPING SEROLOGIC ABO: CPT

## 2022-01-31 PROCEDURE — 87086 URINE CULTURE/COLONY COUNT: CPT

## 2022-01-31 PROCEDURE — 36415 COLL VENOUS BLD VENIPUNCTURE: CPT

## 2022-01-31 PROCEDURE — 86140 C-REACTIVE PROTEIN: CPT

## 2022-01-31 PROCEDURE — 86850 RBC ANTIBODY SCREEN: CPT

## 2022-01-31 PROCEDURE — 85730 THROMBOPLASTIN TIME PARTIAL: CPT

## 2022-01-31 PROCEDURE — 85610 PROTHROMBIN TIME: CPT

## 2022-01-31 PROCEDURE — 80048 BASIC METABOLIC PNL TOTAL CA: CPT

## 2022-01-31 PROCEDURE — 86258 DGP ANTIBODY EACH IG CLASS: CPT

## 2022-01-31 PROCEDURE — 86901 BLOOD TYPING SEROLOGIC RH(D): CPT

## 2022-01-31 PROCEDURE — 85025 COMPLETE CBC W/AUTO DIFF WBC: CPT

## 2022-01-31 PROCEDURE — 86923 COMPATIBILITY TEST ELECTRIC: CPT

## 2022-01-31 PROCEDURE — 86231 EMA EACH IG CLASS: CPT

## 2022-01-31 PROCEDURE — 82784 ASSAY IGA/IGD/IGG/IGM EACH: CPT

## 2022-01-31 PROCEDURE — 86364 TISS TRNSGLTMNASE EA IG CLAS: CPT

## 2022-01-31 PROCEDURE — 85652 RBC SED RATE AUTOMATED: CPT

## 2022-02-01 LAB
BACTERIA UR CULT: NORMAL
ENDOMYSIUM IGA SER QL: NEGATIVE
GLIADIN PEPTIDE IGA SER-ACNC: 3 UNITS (ref 0–19)
GLIADIN PEPTIDE IGG SER-ACNC: 2 UNITS (ref 0–19)
IGA SERPL-MCNC: 163 MG/DL (ref 87–352)
TTG IGA SER-ACNC: <2 U/ML (ref 0–3)
TTG IGG SER-ACNC: 4 U/ML (ref 0–5)

## 2022-02-01 NOTE — PRE-PROCEDURE INSTRUCTIONS
Pre-Surgery Instructions:   Medication Instructions    dicyclomine (BENTYL) 10 mg capsule Instructed to take per normal schedule except DOS    multivitamin (THERAGRAN) TABS Hold one week prior to DOS    norethindrone-ethinyl estradiol (Junel FE 1/20) 1-20 MG-MCG per tablet Instructed to take per normal schedule including DOS with sips water    pantoprazole (PROTONIX) 20 mg tablet Instructed to take per normal schedule including DOS with sips water    triamcinolone (KENALOG) 0 5 % ointment Do not use night prior to or DOS after skin prep showers     Have you had / have a sore throat? no  Have you had / have a cough less than 1 week? no  Have you had / have a fever greater than 100 0 - 100  4? no  Are you experiencing any shortness of breath? No  Fully vaccinated  Reviewed with patient, in detail, instructions from "My Surgical Experience"  Instructed to avoid all ASA and OTC Vit/Supp 1 week prior to surgery and to avoid NSAIDs 7 days prior to surgery per anesthesia instructions  Tylenol ok to take prn  Advised patient that [facility] will call with surgery arrival time and hospital directions the business day prior to surgery  Advised patient nothing eat or drink after midnight  Patient verbalized understanding and knows to call surgeon's office with any additional questions prior to surgery  Patient verbalized understanding of current visitor restrictions and advised that he/she can confirm these at time of arrival call with [facility]  Reviewed Carb Drink Instructions per Surgeon/Anes  Guidelines (Val)    My Surgical Experience    The following information was developed to assist you to prepare for your operation  What do I need to do before coming to the hospital?   Arrange for a responsible person to drive you to and from the hospital    Arrange care for your children at home  Children are not allowed in the recovery areas of the hospital   Plan to wear clothing that is easy to put on and take off   If you are having shoulder surgery, wear a shirt that buttons or zippers in the front  Bathing  o Shower the evening before and the morning of your surgery with an antibacterial soap  Please refer to the Pre Op Showering Instructions for Surgery Patients Sheet   o Remove nail polish and all body piercing jewelry  o Do not shave any body part for at least 24 hours before surgery-this includes face, arms, legs and upper body  Food  o Nothing to eat or drink after midnight the night before your surgery  This includes candy and chewing gum  o Exception: If your surgery is after 12:00pm (noon), you may have clear liquids such as 7-Up®, ginger ale, apple or cranberry juice, Jell-O®, water, or clear broth until 8:00 am  o Do not drink milk or juice with pulp on the morning before surgery  o Do not drink alcohol 24 hours before surgery  Medicine  o Follow instructions you received from your surgeon about which medicines you may take on the day of surgery  o If instructed to take medicine on the morning of surgery, take pills with just a small sip of water  Call your prescribing doctor for specific information on what to do if you take insulin    What should I bring to the hospital?    Bring:  Tenzin Banuelos or a walker, if you have them, for foot or knee surgery   A list of the daily medicines, vitamins, minerals, herbals and nutritional supplements you take  Include the dosages of medicines and the time you take them each day   Glasses, dentures or hearing aids   Minimal clothing; you will be wearing hospital sleepwear   Photo ID; required to verify your identity   If you have a Living Will or Power of , bring a copy of the documents   If you have an ostomy, bring an extra pouch and any supplies you use    Do not bring   Medicines or inhalers   Money, valuables or jewelry    What other information should I know about the day of surgery?    Notify your surgeons if you develop a cold, sore throat, cough, fever, rash or any other illness   Report to the Ambulatory Surgical/Same Day Surgery Unit   You will be instructed to stop at Registration only if you have not been pre-registered   Inform your  fi they do not stay that they will be asked by the staff to leave a phone number where they can be reached   Be available to be reached before surgery  In the event the operating room schedule changes, you may be asked to come in earlier or later than expected    *It is important to tell your doctor and others involved in your health care if you are taking or have been taking any non-prescription drugs, vitamins, minerals, herbals or other nutritional supplements   Any of these may interact with some food or medicines and cause a reaction

## 2022-02-08 ENCOUNTER — TELEPHONE (OUTPATIENT)
Dept: GASTROENTEROLOGY | Facility: AMBULARY SURGERY CENTER | Age: 37
End: 2022-02-08

## 2022-02-09 ENCOUNTER — HOSPITAL ENCOUNTER (OUTPATIENT)
Dept: GASTROENTEROLOGY | Facility: AMBULARY SURGERY CENTER | Age: 37
Setting detail: OUTPATIENT SURGERY
Discharge: HOME | End: 2022-02-09
Attending: INTERNAL MEDICINE | Admitting: INTERNAL MEDICINE
Payer: COMMERCIAL

## 2022-02-09 ENCOUNTER — ANESTHESIA (OUTPATIENT)
Dept: GASTROENTEROLOGY | Facility: AMBULARY SURGERY CENTER | Age: 37
End: 2022-02-09

## 2022-02-09 ENCOUNTER — TELEPHONE (OUTPATIENT)
Dept: GASTROENTEROLOGY | Facility: AMBULARY SURGERY CENTER | Age: 37
End: 2022-02-09

## 2022-02-09 ENCOUNTER — ANESTHESIA EVENT (OUTPATIENT)
Dept: GASTROENTEROLOGY | Facility: AMBULARY SURGERY CENTER | Age: 37
End: 2022-02-09

## 2022-02-09 VITALS
SYSTOLIC BLOOD PRESSURE: 119 MMHG | RESPIRATION RATE: 18 BRPM | HEART RATE: 66 BPM | DIASTOLIC BLOOD PRESSURE: 73 MMHG | WEIGHT: 157 LBS | OXYGEN SATURATION: 100 % | HEIGHT: 67 IN | BODY MASS INDEX: 24.64 KG/M2 | TEMPERATURE: 98 F

## 2022-02-09 DIAGNOSIS — R10.816 EPIGASTRIC ABDOMINAL TENDERNESS, REBOUND TENDERNESS PRESENCE NOT SPECIFIED: ICD-10-CM

## 2022-02-09 DIAGNOSIS — R10.9 RIGHT SIDED ABDOMINAL PAIN: ICD-10-CM

## 2022-02-09 DIAGNOSIS — R19.4 CHANGE IN BOWEL HABITS: ICD-10-CM

## 2022-02-09 LAB
EXT PREGNANCY TEST URINE: NEGATIVE
EXT. CONTROL: NORMAL

## 2022-02-09 PROCEDURE — 81025 URINE PREGNANCY TEST: CPT | Performed by: INTERNAL MEDICINE

## 2022-02-09 PROCEDURE — 45380 COLONOSCOPY AND BIOPSY: CPT | Performed by: INTERNAL MEDICINE

## 2022-02-09 PROCEDURE — 88305 TISSUE EXAM BY PATHOLOGIST: CPT | Performed by: PATHOLOGY

## 2022-02-09 PROCEDURE — 43239 EGD BIOPSY SINGLE/MULTIPLE: CPT | Performed by: INTERNAL MEDICINE

## 2022-02-09 RX ORDER — PROPOFOL 10 MG/ML
INJECTION, EMULSION INTRAVENOUS CONTINUOUS PRN
Status: DISCONTINUED | OUTPATIENT
Start: 2022-02-09 | End: 2022-02-09

## 2022-02-09 RX ORDER — LIDOCAINE HYDROCHLORIDE 10 MG/ML
INJECTION, SOLUTION EPIDURAL; INFILTRATION; INTRACAUDAL; PERINEURAL AS NEEDED
Status: DISCONTINUED | OUTPATIENT
Start: 2022-02-09 | End: 2022-02-09

## 2022-02-09 RX ORDER — SIMETHICONE 20 MG/.3ML
EMULSION ORAL CODE/TRAUMA/SEDATION MEDICATION
Status: COMPLETED | OUTPATIENT
Start: 2022-02-09 | End: 2022-02-09

## 2022-02-09 RX ORDER — LIDOCAINE HYDROCHLORIDE 10 MG/ML
0.5 INJECTION, SOLUTION EPIDURAL; INFILTRATION; INTRACAUDAL; PERINEURAL ONCE AS NEEDED
Status: DISCONTINUED | OUTPATIENT
Start: 2022-02-09 | End: 2022-02-13 | Stop reason: HOSPADM

## 2022-02-09 RX ORDER — PROPOFOL 10 MG/ML
INJECTION, EMULSION INTRAVENOUS AS NEEDED
Status: DISCONTINUED | OUTPATIENT
Start: 2022-02-09 | End: 2022-02-09

## 2022-02-09 RX ORDER — SODIUM CHLORIDE, SODIUM LACTATE, POTASSIUM CHLORIDE, CALCIUM CHLORIDE 600; 310; 30; 20 MG/100ML; MG/100ML; MG/100ML; MG/100ML
125 INJECTION, SOLUTION INTRAVENOUS CONTINUOUS
Status: DISCONTINUED | OUTPATIENT
Start: 2022-02-09 | End: 2022-02-13 | Stop reason: HOSPADM

## 2022-02-09 RX ADMIN — PROPOFOL 50 MG: 10 INJECTION, EMULSION INTRAVENOUS at 09:57

## 2022-02-09 RX ADMIN — PROPOFOL 50 MG: 10 INJECTION, EMULSION INTRAVENOUS at 09:52

## 2022-02-09 RX ADMIN — PROPOFOL 50 MG: 10 INJECTION, EMULSION INTRAVENOUS at 09:55

## 2022-02-09 RX ADMIN — SODIUM CHLORIDE, SODIUM LACTATE, POTASSIUM CHLORIDE, AND CALCIUM CHLORIDE: .6; .31; .03; .02 INJECTION, SOLUTION INTRAVENOUS at 09:48

## 2022-02-09 RX ADMIN — PROPOFOL 50 MG: 10 INJECTION, EMULSION INTRAVENOUS at 10:14

## 2022-02-09 RX ADMIN — PROPOFOL 150 MCG/KG/MIN: 10 INJECTION, EMULSION INTRAVENOUS at 10:00

## 2022-02-09 RX ADMIN — PROPOFOL 150 MG: 10 INJECTION, EMULSION INTRAVENOUS at 09:49

## 2022-02-09 RX ADMIN — PROPOFOL 50 MG: 10 INJECTION, EMULSION INTRAVENOUS at 10:00

## 2022-02-09 RX ADMIN — Medication 40 MG: at 10:10

## 2022-02-09 RX ADMIN — PROPOFOL 50 MG: 10 INJECTION, EMULSION INTRAVENOUS at 09:50

## 2022-02-09 RX ADMIN — LIDOCAINE HYDROCHLORIDE 50 MG: 10 INJECTION, SOLUTION EPIDURAL; INFILTRATION; INTRACAUDAL at 09:49

## 2022-02-09 NOTE — H&P
History and Physical -  [facility]  Gideon Mills 43 y o  female MRN: [id number]    HPI: Gideon Mills is a 43y o  year old female who presents for evaluation of abdominal pain and change in bowel habits  Review of Systems    Historical Information   Past Medical History:   Diagnosis Date    Anemia     Female infertility     IVF pregnacny , IVF     Gestational hypertension, third trimester     resolved:  May 12, 2015    Infertility, female     Migraine     Resolved: Nov 3, 2015    Ovarian cyst     removed , 6lb cyst    Preeclampsia     Pregnancy resulting from in vitro fertilization in third trimester 2018    Seasonal allergies     Type AB blood, Rh positive     Varicella     had as child around 8years old     Visual impairment     eywear      Past Surgical History:   Procedure Laterality Date     SECTION, LOW TRANSVERSE  2015 daughter    MOUTH FLOOR MASS EXCISION  1985    OOPHORECTOMY Left     unilateral - Removal of one ovary     OVARIAN CYST REMOVAL Left     HI  DELIVERY ONLY N/A 2018    Procedure:  SECTION () REPEAT;  Surgeon: Lesvia Fontaine MD;  [facility];  Service: Obstetrics     Social History   Social History     Substance and Sexual Activity   Alcohol Use Yes    Comment: socially     Social History     Substance and Sexual Activity   Drug Use No     Social History     Tobacco Use   Smoking Status Never Smoker   Smokeless Tobacco Never Used     Family History   Problem Relation Age of Onset    Varicose Veins Mother     Cirrhosis Mother     Alcohol abuse Mother     Hyperlipidemia Father     Arthritis Maternal Grandmother     Hypertension Maternal Grandfather     Cancer Paternal Grandfather         lung    Breast cancer Maternal Aunt 36    Cancer Maternal Aunt     Substance Abuse Neg Hx     Mental illness Neg Hx     Depression Neg Hx        Meds/Allergies     (Not in a hospital admission)      Allergies   Allergen Reactions    Neosporin [Neomycin-Bacitracin Zn-Polymyx] Hives    Pollen Extract Allergic Rhinitis       Objective     /82   Pulse 87   Temp 98 4 °F (36 9 °C) (Temporal)   Resp 16   Ht 5' 7" (1 702 m)   Wt 71 2 kg (157 lb)   SpO2 100%   BMI 24 59 kg/m²       PHYSICAL EXAM    Gen: NAD  CV: RRR  CHEST: Clear  ABD: soft, NT/ND  EXT: no edema  Neuro: AAO      ASSESSMENT/PLAN:  This is a 43y o  year old female here for evaluation of abdominal pain and change in bowel habits  PLAN:   Procedure:  EGD and colonoscopy

## 2022-02-09 NOTE — ANESTHESIA PREPROCEDURE EVALUATION
Procedure:  COLONOSCOPY  EGD    Relevant Problems   NEURO/PSYCH   (+) History of 2019 novel coronavirus disease (COVID-19)   (+) History of anxiety        Physical Exam    Airway    Mallampati score: II  TM Distance: >3 FB  Neck ROM: full     Dental       Cardiovascular      Pulmonary      Other Findings        Anesthesia Plan  ASA Score- 2     Anesthesia Type- IV sedation with anesthesia with ASA Monitors  Additional Monitors:   Airway Plan:           Plan Factors-    Chart reviewed  Induction- intravenous  Postoperative Plan-     Informed Consent- Anesthetic plan and risks discussed with patient  I personally reviewed this patient with the CRNA  Discussed and agreed on the Anesthesia Plan with the CRNA  Dara Soulier

## 2022-02-09 NOTE — ANESTHESIA POSTPROCEDURE EVALUATION
Post-Op Assessment Note    CV Status:  Stable  Pain Score: 0    Pain management: adequate     Mental Status:  Alert and awake   Hydration Status:  Euvolemic   PONV Controlled:  Controlled   Airway Patency:  Patent      Post Op Vitals Reviewed: Yes      Staff: CRNA         No complications documented      BP 97/54 (02/09/22 1023)    Temp 98 °F (36 7 °C) (02/09/22 1023)    Pulse 66 (02/09/22 1023)   Resp 16 (02/09/22 1023)    SpO2 100 % (02/09/22 1023)

## 2022-02-11 ENCOUNTER — OFFICE VISIT (OUTPATIENT)
Dept: INTERNAL MEDICINE CLINIC | Facility: CLINIC | Age: 37
End: 2022-02-11
Payer: COMMERCIAL

## 2022-02-11 ENCOUNTER — TELEPHONE (OUTPATIENT)
Dept: INTERNAL MEDICINE CLINIC | Facility: CLINIC | Age: 37
End: 2022-02-11

## 2022-02-11 VITALS
HEART RATE: 63 BPM | SYSTOLIC BLOOD PRESSURE: 122 MMHG | HEIGHT: 67 IN | RESPIRATION RATE: 15 BRPM | BODY MASS INDEX: 24.74 KG/M2 | DIASTOLIC BLOOD PRESSURE: 74 MMHG | TEMPERATURE: 98.5 F | OXYGEN SATURATION: 99 % | WEIGHT: 157.6 LBS

## 2022-02-11 DIAGNOSIS — Z01.818 PRE-OP EXAMINATION: Primary | ICD-10-CM

## 2022-02-11 DIAGNOSIS — N28.89 RENAL MASS: ICD-10-CM

## 2022-02-11 PROCEDURE — 99214 OFFICE O/P EST MOD 30 MIN: CPT | Performed by: INTERNAL MEDICINE

## 2022-02-11 PROCEDURE — 1036F TOBACCO NON-USER: CPT | Performed by: INTERNAL MEDICINE

## 2022-02-11 PROCEDURE — 3725F SCREEN DEPRESSION PERFORMED: CPT | Performed by: INTERNAL MEDICINE

## 2022-02-11 NOTE — PATIENT INSTRUCTIONS
1  Will send pre-op paperwork to surgeon's office by Monday  2   Return as scheduled or sooner if questions

## 2022-02-11 NOTE — TELEPHONE ENCOUNTER
Okay to come in for her appt if her  tested negative and she is feeling better    If she can wear a KN95 or N95 mask that would be great    thanks

## 2022-02-11 NOTE — TELEPHONE ENCOUNTER
PT CALLED, SAID THAT SINCE Wednesday SHE'S HAD SINUS CONGESTION AND A SORE THROAT, NO OTHER SYMPTOMS AND IS FEELING BETTER    HER  HAS BEEN SICK TOO BUT HE WAS TESTED FOR COVID AND IS NEGATIVE   SHE HAS NOT BEEN TESTED     SHE'S VACCINATED BUT WANTED TO MAKE SURE IT WAS OK TO STILL COME IN FOR HER PRE OP THIS MORNING     PLEASE ADVISE

## 2022-02-11 NOTE — Clinical Note
Hi    The mobile swabbing stations are closed as of last week    Do you want Georgie Hoffman swabbed on a certain day next week? We could swab her for PAT COVID on Wednesday 2/16  She also have COVID-19 infection back in December FYI    Let me know    thanks

## 2022-02-16 ENCOUNTER — CLINICAL SUPPORT (OUTPATIENT)
Dept: INTERNAL MEDICINE CLINIC | Facility: CLINIC | Age: 37
End: 2022-02-16

## 2022-02-16 DIAGNOSIS — Z01.818 PRE-OP EXAMINATION: Primary | ICD-10-CM

## 2022-02-16 DIAGNOSIS — N28.89 RENAL MASS, RIGHT: ICD-10-CM

## 2022-02-16 PROCEDURE — U0005 INFEC AGEN DETEC AMPLI PROBE: HCPCS | Performed by: INTERNAL MEDICINE

## 2022-02-16 PROCEDURE — U0003 INFECTIOUS AGENT DETECTION BY NUCLEIC ACID (DNA OR RNA); SEVERE ACUTE RESPIRATORY SYNDROME CORONAVIRUS 2 (SARS-COV-2) (CORONAVIRUS DISEASE [COVID-19]), AMPLIFIED PROBE TECHNIQUE, MAKING USE OF HIGH THROUGHPUT TECHNOLOGIES AS DESCRIBED BY CMS-2020-01-R: HCPCS | Performed by: INTERNAL MEDICINE

## 2022-02-17 LAB — SARS-COV-2 RNA RESP QL NAA+PROBE: NEGATIVE

## 2022-02-18 PROCEDURE — NC001 PR NO CHARGE: Performed by: UROLOGY

## 2022-02-18 NOTE — H&P
H&P Exam - Urology   Esperanza Terrazas 43 y o  female MRN: [id number]  Unit/Bed#: OR [place] Encounter: [id number]    Assessment/Plan     Assessment:  61-year-old woman with a renal mass, right side, measuring 1 6 centimeters, enhancing with contrast administration, concerning for renal cell carcinoma  The patient has been advised of all of her options including surveillance and she has opted for surgical extirpation by way of a robotic partial nephrectomy  She has been marked on her right-hand side      Plan:  Proceed to robot assisted partial nephrectomy and all indicated procedures, right-hand side  History of Present Illness   HPI:  Esperanza Terrazas is a 43 y o  female who presents with a history of anxiety and a small renal mass  She was counseled extensively at her last office visit on her management options, she has opted for removal of her right renal mass by way of a robot assisted approach to laparoscopic partial nephrectomy  Changes in her state of health since the last time we saw her include nervousness, no other complaints  The following portions of the patient's history were reviewed and updated as appropriate: allergies, current medications, past family history, past medical history, past social history, past surgical history and problem list     Review of Systems   Constitutional: Negative  HENT: Negative  Eyes: Negative  Respiratory: Negative  Cardiovascular: Negative  Gastrointestinal: Negative  Endocrine: Negative  Genitourinary: Negative  Musculoskeletal: Negative  Skin: Negative  Allergic/Immunologic: Negative  Neurological: Negative  Hematological: Negative  Psychiatric/Behavioral: The patient is nervous/anxious  Historical Information   Past Medical History:   Diagnosis Date    Anemia     Female infertility     IVF pregnacny 2017, IVF 2014    Gestational hypertension, third trimester     resolved:  May 12, 2015    Infertility, female  Migraine     Resolved: Nov 3, 2015    Ovarian cyst     removed 2005, 6lb cyst    Preeclampsia     Pregnancy resulting from in vitro fertilization in third trimester 2018    Seasonal allergies     Type AB blood, Rh positive     Varicella     had as child around 8years old     Visual impairment     eywear      Past Surgical History:   Procedure Laterality Date     SECTION, LOW TRANSVERSE  2015 daughter    MOUTH FLOOR MASS EXCISION  1985    OOPHORECTOMY Left     unilateral - Removal of one ovary     OVARIAN CYST REMOVAL Left     GA  DELIVERY ONLY N/A 2018    Procedure:  SECTION () REPEAT;  Surgeon: Sharee Stoner MD;  Location: [facility];  Service: Obstetrics     Social History   Social History     Substance and Sexual Activity   Alcohol Use Yes    Comment: socially     Social History     Substance and Sexual Activity   Drug Use No     Social History     Tobacco Use   Smoking Status Never Smoker   Smokeless Tobacco Never Used     E-Cigarette/Vaping    E-Cigarette Use Never User      E-Cigarette/Vaping Substances    Nicotine No     THC No     CBD No      Family History:   Family History   Problem Relation Age of Onset    Varicose Veins Mother     Cirrhosis Mother     Alcohol abuse Mother     Hyperlipidemia Father     Arthritis Maternal Grandmother     Hypertension Maternal Grandfather     Cancer Paternal Grandfather         lung    Breast cancer Maternal Aunt 36    Cancer Maternal Aunt     Substance Abuse Neg Hx     Mental illness Neg Hx     Depression Neg Hx        Meds/Allergies   PTA meds:   Cannot display prior to admission medications because the patient has not been admitted in this contact       Allergies   Allergen Reactions    Neosporin [Neomycin-Bacitracin Zn-Polymyx] Hives    Pollen Extract Allergic Rhinitis       Objective   Vitals: Height 5' 7" (1 702 m), weight 70 8 kg (156 lb), not currently breastfeeding  No intake/output data recorded  Invasive Devices  Report    None                 Physical Exam  Vitals reviewed  Constitutional:       General: She is not in acute distress  Appearance: Normal appearance  She is not ill-appearing, toxic-appearing or diaphoretic  HENT:      Head: Normocephalic and atraumatic  Eyes:      General: No scleral icterus  Right eye: No discharge  Left eye: No discharge  Cardiovascular:      Pulses: Normal pulses  Pulmonary:      Effort: Pulmonary effort is normal    Abdominal:      General: There is no distension  Palpations: There is no mass  Tenderness: There is no abdominal tenderness  Hernia: No hernia is present  Musculoskeletal:         General: No swelling  Comments: Marked on right arm   Skin:     General: Skin is warm  Neurological:      General: No focal deficit present  Mental Status: She is alert and oriented to person, place, and time  Cranial Nerves: No cranial nerve deficit  Psychiatric:         Mood and Affect: Mood normal          Behavior: Behavior normal          Thought Content: Thought content normal          Judgment: Judgment normal          Lab Results: I have personally reviewed pertinent reports  Imaging: I have personally reviewed pertinent reports  EKG, Pathology, and Other Studies: I have personally reviewed pertinent reports  VTE Prophylaxis: Sequential compression device Estrella Peng)     Code Status: Prior  Advance Directive and Living Will:      Power of :    POLST:      Counseling / Coordination of Care  Total floor / unit time spent today 15 minutes  Greater than 50% of total time was spent with the patient and / or family counseling and / or coordination of care  A description of the counseling / coordination of care:  Review of today's case

## 2022-02-21 ENCOUNTER — ANESTHESIA EVENT (INPATIENT)
Dept: PERIOP | Facility: HOSPITAL | Age: 37
DRG: 658 | End: 2022-02-21
Payer: COMMERCIAL

## 2022-02-21 ENCOUNTER — ANESTHESIA (INPATIENT)
Dept: PERIOP | Facility: HOSPITAL | Age: 37
DRG: 658 | End: 2022-02-21
Payer: COMMERCIAL

## 2022-02-21 ENCOUNTER — HOSPITAL ENCOUNTER (INPATIENT)
Facility: HOSPITAL | Age: 37
LOS: 1 days | Discharge: HOME | DRG: 658 | End: 2022-02-22
Attending: UROLOGY | Admitting: UROLOGY
Payer: COMMERCIAL

## 2022-02-21 ENCOUNTER — TELEPHONE (OUTPATIENT)
Dept: UROLOGY | Facility: CLINIC | Age: 37
End: 2022-02-21

## 2022-02-21 DIAGNOSIS — N28.89 RENAL MASS, RIGHT: Primary | ICD-10-CM

## 2022-02-21 DIAGNOSIS — R10.9 RIGHT SIDED ABDOMINAL PAIN: ICD-10-CM

## 2022-02-21 DIAGNOSIS — R19.4 CHANGE IN BOWEL HABITS: ICD-10-CM

## 2022-02-21 LAB
ANION GAP SERPL CALCULATED.3IONS-SCNC: 9 MMOL/L (ref 4–13)
BUN SERPL-MCNC: 11 MG/DL (ref 5–25)
CALCIUM SERPL-MCNC: 8.4 MG/DL (ref 8.3–10.1)
CHLORIDE SERPL-SCNC: 105 MMOL/L (ref 100–108)
CO2 SERPL-SCNC: 26 MMOL/L (ref 21–32)
CREAT SERPL-MCNC: 0.93 MG/DL (ref 0.6–1.3)
ERYTHROCYTE [DISTWIDTH] IN BLOOD BY AUTOMATED COUNT: 13.2 % (ref 11.6–15.1)
EXT PREGNANCY TEST URINE: NEGATIVE
EXT. CONTROL: NORMAL
GFR SERPL CREATININE-BSD FRML MDRD: 78 ML/MIN/1.73SQ M
GLUCOSE SERPL-MCNC: 148 MG/DL (ref 65–140)
HCT VFR BLD AUTO: 36.3 % (ref 34.8–46.1)
HGB BLD-MCNC: 11.8 G/DL (ref 11.5–15.4)
MCH RBC QN AUTO: 29.9 PG (ref 26.8–34.3)
MCHC RBC AUTO-ENTMCNC: 32.5 G/DL (ref 31.4–37.4)
MCV RBC AUTO: 92 FL (ref 82–98)
PLATELET # BLD AUTO: 233 THOUSANDS/UL (ref 149–390)
PMV BLD AUTO: 10.2 FL (ref 8.9–12.7)
POTASSIUM SERPL-SCNC: 3.5 MMOL/L (ref 3.5–5.3)
RBC # BLD AUTO: 3.95 MILLION/UL (ref 3.81–5.12)
SODIUM SERPL-SCNC: 140 MMOL/L (ref 136–145)
WBC # BLD AUTO: 10.63 THOUSAND/UL (ref 4.31–10.16)

## 2022-02-21 PROCEDURE — 76998 US GUIDE INTRAOP: CPT | Performed by: UROLOGY

## 2022-02-21 PROCEDURE — 88341 IMHCHEM/IMCYTCHM EA ADD ANTB: CPT | Performed by: PATHOLOGY

## 2022-02-21 PROCEDURE — 88307 TISSUE EXAM BY PATHOLOGIST: CPT | Performed by: PATHOLOGY

## 2022-02-21 PROCEDURE — 80048 BASIC METABOLIC PNL TOTAL CA: CPT | Performed by: UROLOGY

## 2022-02-21 PROCEDURE — NC001 PR NO CHARGE: Performed by: PHYSICIAN ASSISTANT

## 2022-02-21 PROCEDURE — 8E0W8CZ ROBOTIC ASSISTED PROCEDURE OF TRUNK REGION, VIA NATURAL OR ARTIFICIAL OPENING ENDOSCOPIC: ICD-10-PCS | Performed by: UROLOGY

## 2022-02-21 PROCEDURE — 81025 URINE PREGNANCY TEST: CPT | Performed by: UROLOGY

## 2022-02-21 PROCEDURE — S2900 ROBOTIC SURGICAL SYSTEM: HCPCS | Performed by: UROLOGY

## 2022-02-21 PROCEDURE — 88342 IMHCHEM/IMCYTCHM 1ST ANTB: CPT | Performed by: PATHOLOGY

## 2022-02-21 PROCEDURE — 50593 PERC CRYO ABLATE RENAL TUM: CPT | Performed by: UROLOGY

## 2022-02-21 PROCEDURE — 0TB04ZZ EXCISION OF RIGHT KIDNEY, PERCUTANEOUS ENDOSCOPIC APPROACH: ICD-10-PCS | Performed by: UROLOGY

## 2022-02-21 PROCEDURE — 85027 COMPLETE CBC AUTOMATED: CPT | Performed by: UROLOGY

## 2022-02-21 RX ORDER — LIDOCAINE HYDROCHLORIDE 10 MG/ML
INJECTION, SOLUTION EPIDURAL; INFILTRATION; INTRACAUDAL; PERINEURAL AS NEEDED
Status: DISCONTINUED | OUTPATIENT
Start: 2022-02-21 | End: 2022-02-21

## 2022-02-21 RX ORDER — HYDROMORPHONE HCL 110MG/55ML
PATIENT CONTROLLED ANALGESIA SYRINGE INTRAVENOUS AS NEEDED
Status: DISCONTINUED | OUTPATIENT
Start: 2022-02-21 | End: 2022-02-21

## 2022-02-21 RX ORDER — PANTOPRAZOLE SODIUM 20 MG/1
20 TABLET, DELAYED RELEASE ORAL
Status: DISCONTINUED | OUTPATIENT
Start: 2022-02-22 | End: 2022-02-22 | Stop reason: HOSPADM

## 2022-02-21 RX ORDER — OXYCODONE HYDROCHLORIDE 5 MG/1
5 TABLET ORAL EVERY 4 HOURS PRN
Status: DISCONTINUED | OUTPATIENT
Start: 2022-02-21 | End: 2022-02-22 | Stop reason: HOSPADM

## 2022-02-21 RX ORDER — METOCLOPRAMIDE HYDROCHLORIDE 5 MG/ML
INJECTION INTRAMUSCULAR; INTRAVENOUS AS NEEDED
Status: DISCONTINUED | OUTPATIENT
Start: 2022-02-21 | End: 2022-02-21

## 2022-02-21 RX ORDER — KETAMINE HYDROCHLORIDE 50 MG/ML
INJECTION, SOLUTION, CONCENTRATE INTRAMUSCULAR; INTRAVENOUS AS NEEDED
Status: DISCONTINUED | OUTPATIENT
Start: 2022-02-21 | End: 2022-02-21

## 2022-02-21 RX ORDER — OXYCODONE HYDROCHLORIDE 5 MG/1
5 TABLET ORAL EVERY 4 HOURS PRN
Qty: 8 TABLET | Refills: 0 | Status: SHIPPED | OUTPATIENT
Start: 2022-02-21 | End: 2022-02-26

## 2022-02-21 RX ORDER — FENTANYL CITRATE/PF 50 MCG/ML
50 SYRINGE (ML) INJECTION
Status: COMPLETED | OUTPATIENT
Start: 2022-02-21 | End: 2022-02-21

## 2022-02-21 RX ORDER — SODIUM CHLORIDE, SODIUM LACTATE, POTASSIUM CHLORIDE, CALCIUM CHLORIDE 600; 310; 30; 20 MG/100ML; MG/100ML; MG/100ML; MG/100ML
INJECTION, SOLUTION INTRAVENOUS CONTINUOUS PRN
Status: DISCONTINUED | OUTPATIENT
Start: 2022-02-21 | End: 2022-02-21

## 2022-02-21 RX ORDER — HYDROMORPHONE HCL/PF 1 MG/ML
0.5 SYRINGE (ML) INJECTION EVERY 2 HOUR PRN
Status: DISCONTINUED | OUTPATIENT
Start: 2022-02-21 | End: 2022-02-22 | Stop reason: HOSPADM

## 2022-02-21 RX ORDER — MEPERIDINE HYDROCHLORIDE 25 MG/ML
12.5 INJECTION INTRAMUSCULAR; INTRAVENOUS; SUBCUTANEOUS ONCE
Status: DISCONTINUED | OUTPATIENT
Start: 2022-02-21 | End: 2022-02-21 | Stop reason: HOSPADM

## 2022-02-21 RX ORDER — PROMETHAZINE HYDROCHLORIDE 25 MG/ML
12.5 INJECTION, SOLUTION INTRAMUSCULAR; INTRAVENOUS ONCE
Status: DISCONTINUED | OUTPATIENT
Start: 2022-02-21 | End: 2022-02-21 | Stop reason: HOSPADM

## 2022-02-21 RX ORDER — FENTANYL CITRATE 50 UG/ML
INJECTION, SOLUTION INTRAMUSCULAR; INTRAVENOUS AS NEEDED
Status: DISCONTINUED | OUTPATIENT
Start: 2022-02-21 | End: 2022-02-21

## 2022-02-21 RX ORDER — BUPIVACAINE HYDROCHLORIDE 2.5 MG/ML
INJECTION, SOLUTION EPIDURAL; INFILTRATION; INTRACAUDAL AS NEEDED
Status: DISCONTINUED | OUTPATIENT
Start: 2022-02-21 | End: 2022-02-21 | Stop reason: HOSPADM

## 2022-02-21 RX ORDER — ONDANSETRON 2 MG/ML
4 INJECTION INTRAMUSCULAR; INTRAVENOUS ONCE AS NEEDED
Status: DISCONTINUED | OUTPATIENT
Start: 2022-02-21 | End: 2022-02-21 | Stop reason: HOSPADM

## 2022-02-21 RX ORDER — HYDROMORPHONE HCL/PF 1 MG/ML
0.5 SYRINGE (ML) INJECTION
Status: DISCONTINUED | OUTPATIENT
Start: 2022-02-21 | End: 2022-02-21 | Stop reason: HOSPADM

## 2022-02-21 RX ORDER — ROCURONIUM BROMIDE 10 MG/ML
INJECTION, SOLUTION INTRAVENOUS AS NEEDED
Status: DISCONTINUED | OUTPATIENT
Start: 2022-02-21 | End: 2022-02-21

## 2022-02-21 RX ORDER — DOCUSATE SODIUM 100 MG/1
100 CAPSULE, LIQUID FILLED ORAL 3 TIMES DAILY
Qty: 42 CAPSULE | Refills: 0 | Status: SHIPPED | OUTPATIENT
Start: 2022-02-21 | End: 2022-06-20

## 2022-02-21 RX ORDER — ACETAMINOPHEN 325 MG/1
975 TABLET ORAL ONCE
Status: COMPLETED | OUTPATIENT
Start: 2022-02-21 | End: 2022-02-21

## 2022-02-21 RX ORDER — SODIUM CHLORIDE, SODIUM LACTATE, POTASSIUM CHLORIDE, CALCIUM CHLORIDE 600; 310; 30; 20 MG/100ML; MG/100ML; MG/100ML; MG/100ML
125 INJECTION, SOLUTION INTRAVENOUS CONTINUOUS
Status: DISCONTINUED | OUTPATIENT
Start: 2022-02-21 | End: 2022-02-22

## 2022-02-21 RX ORDER — DICYCLOMINE HYDROCHLORIDE 10 MG/1
10 CAPSULE ORAL EVERY 8 HOURS PRN
Status: DISCONTINUED | OUTPATIENT
Start: 2022-02-21 | End: 2022-02-22 | Stop reason: HOSPADM

## 2022-02-21 RX ORDER — ONDANSETRON 2 MG/ML
INJECTION INTRAMUSCULAR; INTRAVENOUS AS NEEDED
Status: DISCONTINUED | OUTPATIENT
Start: 2022-02-21 | End: 2022-02-21

## 2022-02-21 RX ORDER — ONDANSETRON 2 MG/ML
4 INJECTION INTRAMUSCULAR; INTRAVENOUS EVERY 6 HOURS PRN
Status: DISCONTINUED | OUTPATIENT
Start: 2022-02-21 | End: 2022-02-22 | Stop reason: HOSPADM

## 2022-02-21 RX ORDER — MAGNESIUM HYDROXIDE/ALUMINUM HYDROXICE/SIMETHICONE 120; 1200; 1200 MG/30ML; MG/30ML; MG/30ML
30 SUSPENSION ORAL EVERY 6 HOURS PRN
Status: DISCONTINUED | OUTPATIENT
Start: 2022-02-21 | End: 2022-02-22 | Stop reason: HOSPADM

## 2022-02-21 RX ORDER — ACETAMINOPHEN 325 MG/1
650 TABLET ORAL EVERY 6 HOURS SCHEDULED
Status: DISCONTINUED | OUTPATIENT
Start: 2022-02-21 | End: 2022-02-22 | Stop reason: HOSPADM

## 2022-02-21 RX ORDER — SODIUM CHLORIDE 9 MG/ML
INJECTION, SOLUTION INTRAVENOUS CONTINUOUS PRN
Status: DISCONTINUED | OUTPATIENT
Start: 2022-02-21 | End: 2022-02-21

## 2022-02-21 RX ORDER — PROPOFOL 10 MG/ML
INJECTION, EMULSION INTRAVENOUS AS NEEDED
Status: DISCONTINUED | OUTPATIENT
Start: 2022-02-21 | End: 2022-02-21

## 2022-02-21 RX ORDER — OXYCODONE HYDROCHLORIDE 10 MG/1
10 TABLET ORAL EVERY 4 HOURS PRN
Status: DISCONTINUED | OUTPATIENT
Start: 2022-02-21 | End: 2022-02-22 | Stop reason: HOSPADM

## 2022-02-21 RX ORDER — SODIUM CHLORIDE, SODIUM LACTATE, POTASSIUM CHLORIDE, CALCIUM CHLORIDE 600; 310; 30; 20 MG/100ML; MG/100ML; MG/100ML; MG/100ML
100 INJECTION, SOLUTION INTRAVENOUS CONTINUOUS
Status: CANCELLED | OUTPATIENT
Start: 2022-02-21

## 2022-02-21 RX ORDER — SENNOSIDES 8.6 MG
1 TABLET ORAL DAILY
Status: DISCONTINUED | OUTPATIENT
Start: 2022-02-21 | End: 2022-02-22 | Stop reason: HOSPADM

## 2022-02-21 RX ORDER — GABAPENTIN 300 MG/1
300 CAPSULE ORAL ONCE
Status: COMPLETED | OUTPATIENT
Start: 2022-02-21 | End: 2022-02-21

## 2022-02-21 RX ORDER — DEXAMETHASONE SODIUM PHOSPHATE 10 MG/ML
INJECTION, SOLUTION INTRAMUSCULAR; INTRAVENOUS AS NEEDED
Status: DISCONTINUED | OUTPATIENT
Start: 2022-02-21 | End: 2022-02-21

## 2022-02-21 RX ORDER — CEFAZOLIN SODIUM 1 G/50ML
1000 SOLUTION INTRAVENOUS EVERY 8 HOURS
Status: COMPLETED | OUTPATIENT
Start: 2022-02-21 | End: 2022-02-22

## 2022-02-21 RX ORDER — CEFAZOLIN SODIUM 1 G/50ML
1000 SOLUTION INTRAVENOUS ONCE
Status: COMPLETED | OUTPATIENT
Start: 2022-02-21 | End: 2022-02-21

## 2022-02-21 RX ORDER — DIPHENHYDRAMINE HCL 25 MG
12.5 TABLET ORAL EVERY 6 HOURS PRN
Status: DISCONTINUED | OUTPATIENT
Start: 2022-02-21 | End: 2022-02-22 | Stop reason: HOSPADM

## 2022-02-21 RX ORDER — DOCUSATE SODIUM 100 MG/1
100 CAPSULE, LIQUID FILLED ORAL 2 TIMES DAILY
Status: DISCONTINUED | OUTPATIENT
Start: 2022-02-21 | End: 2022-02-22 | Stop reason: HOSPADM

## 2022-02-21 RX ORDER — MIDAZOLAM HYDROCHLORIDE 2 MG/2ML
INJECTION, SOLUTION INTRAMUSCULAR; INTRAVENOUS AS NEEDED
Status: DISCONTINUED | OUTPATIENT
Start: 2022-02-21 | End: 2022-02-21

## 2022-02-21 RX ADMIN — CEFAZOLIN SODIUM 1000 MG: 1 SOLUTION INTRAVENOUS at 23:52

## 2022-02-21 RX ADMIN — HYDROMORPHONE HYDROCHLORIDE 0.5 MG: 1 INJECTION, SOLUTION INTRAMUSCULAR; INTRAVENOUS; SUBCUTANEOUS at 10:38

## 2022-02-21 RX ADMIN — ACETAMINOPHEN 650 MG: 325 TABLET, FILM COATED ORAL at 23:52

## 2022-02-21 RX ADMIN — FENTANYL CITRATE 50 MCG: 50 INJECTION INTRAMUSCULAR; INTRAVENOUS at 10:21

## 2022-02-21 RX ADMIN — OXYCODONE HYDROCHLORIDE 5 MG: 5 TABLET ORAL at 12:12

## 2022-02-21 RX ADMIN — HYDROMORPHONE HYDROCHLORIDE 0.5 MG: 2 INJECTION INTRAMUSCULAR; INTRAVENOUS; SUBCUTANEOUS at 08:30

## 2022-02-21 RX ADMIN — FENTANYL CITRATE 50 MCG: 50 INJECTION INTRAMUSCULAR; INTRAVENOUS at 10:26

## 2022-02-21 RX ADMIN — GABAPENTIN 300 MG: 300 CAPSULE ORAL at 07:03

## 2022-02-21 RX ADMIN — SUGAMMADEX 200 MG: 100 INJECTION, SOLUTION INTRAVENOUS at 09:45

## 2022-02-21 RX ADMIN — SODIUM CHLORIDE, SODIUM LACTATE, POTASSIUM CHLORIDE, AND CALCIUM CHLORIDE: .6; .31; .03; .02 INJECTION, SOLUTION INTRAVENOUS at 08:51

## 2022-02-21 RX ADMIN — FENTANYL CITRATE 25 MCG: 50 INJECTION, SOLUTION INTRAMUSCULAR; INTRAVENOUS at 09:53

## 2022-02-21 RX ADMIN — PROPOFOL 100 MG: 10 INJECTION, EMULSION INTRAVENOUS at 07:40

## 2022-02-21 RX ADMIN — MIDAZOLAM HYDROCHLORIDE 2 MG: 1 INJECTION, SOLUTION INTRAMUSCULAR; INTRAVENOUS at 07:32

## 2022-02-21 RX ADMIN — DICYCLOMINE HYDROCHLORIDE 10 MG: 10 CAPSULE ORAL at 19:55

## 2022-02-21 RX ADMIN — STANDARDIZED SENNA CONCENTRATE 8.6 MG: 8.6 TABLET ORAL at 18:03

## 2022-02-21 RX ADMIN — ROCURONIUM BROMIDE 50 MG: 10 SOLUTION INTRAVENOUS at 07:37

## 2022-02-21 RX ADMIN — HYDROMORPHONE HYDROCHLORIDE 0.5 MG: 1 INJECTION, SOLUTION INTRAMUSCULAR; INTRAVENOUS; SUBCUTANEOUS at 10:48

## 2022-02-21 RX ADMIN — CEFAZOLIN SODIUM 1000 MG: 1 SOLUTION INTRAVENOUS at 18:22

## 2022-02-21 RX ADMIN — KETAMINE HYDROCHLORIDE 20 MG: 50 INJECTION INTRAMUSCULAR; INTRAVENOUS at 08:03

## 2022-02-21 RX ADMIN — PROPOFOL 200 MG: 10 INJECTION, EMULSION INTRAVENOUS at 07:37

## 2022-02-21 RX ADMIN — ACETAMINOPHEN 650 MG: 325 TABLET, FILM COATED ORAL at 12:13

## 2022-02-21 RX ADMIN — FENTANYL CITRATE 50 MCG: 50 INJECTION, SOLUTION INTRAMUSCULAR; INTRAVENOUS at 10:02

## 2022-02-21 RX ADMIN — LIDOCAINE HYDROCHLORIDE 50 MG: 10 INJECTION, SOLUTION EPIDURAL; INFILTRATION; INTRACAUDAL at 07:37

## 2022-02-21 RX ADMIN — ACETAMINOPHEN 975 MG: 325 TABLET, FILM COATED ORAL at 07:03

## 2022-02-21 RX ADMIN — DEXAMETHASONE SODIUM PHOSPHATE 10 MG: 10 INJECTION, SOLUTION INTRAMUSCULAR; INTRAVENOUS at 07:37

## 2022-02-21 RX ADMIN — DIPHENHYDRAMINE HYDROCHLORIDE 12.5 MG: 25 TABLET ORAL at 20:45

## 2022-02-21 RX ADMIN — SODIUM CHLORIDE, SODIUM LACTATE, POTASSIUM CHLORIDE, AND CALCIUM CHLORIDE 125 ML/HR: .6; .31; .03; .02 INJECTION, SOLUTION INTRAVENOUS at 18:08

## 2022-02-21 RX ADMIN — ACETAMINOPHEN 650 MG: 325 TABLET, FILM COATED ORAL at 18:02

## 2022-02-21 RX ADMIN — FENTANYL CITRATE 25 MCG: 50 INJECTION, SOLUTION INTRAMUSCULAR; INTRAVENOUS at 09:00

## 2022-02-21 RX ADMIN — KETAMINE HYDROCHLORIDE 15 MG: 50 INJECTION INTRAMUSCULAR; INTRAVENOUS at 08:16

## 2022-02-21 RX ADMIN — FENTANYL CITRATE 50 MCG: 50 INJECTION, SOLUTION INTRAMUSCULAR; INTRAVENOUS at 08:16

## 2022-02-21 RX ADMIN — ONDANSETRON 4 MG: 2 INJECTION INTRAMUSCULAR; INTRAVENOUS at 09:43

## 2022-02-21 RX ADMIN — METOCLOPRAMIDE HYDROCHLORIDE 10 MG: 5 INJECTION INTRAMUSCULAR; INTRAVENOUS at 07:57

## 2022-02-21 RX ADMIN — CEFAZOLIN SODIUM 1000 MG: 1 SOLUTION INTRAVENOUS at 07:53

## 2022-02-21 RX ADMIN — SODIUM CHLORIDE, SODIUM LACTATE, POTASSIUM CHLORIDE, AND CALCIUM CHLORIDE: .6; .31; .03; .02 INJECTION, SOLUTION INTRAVENOUS at 07:32

## 2022-02-21 RX ADMIN — ONDANSETRON 4 MG: 2 INJECTION INTRAMUSCULAR; INTRAVENOUS at 12:44

## 2022-02-21 RX ADMIN — DOCUSATE SODIUM 100 MG: 100 CAPSULE ORAL at 18:03

## 2022-02-21 RX ADMIN — SODIUM CHLORIDE: 9 INJECTION, SOLUTION INTRAVENOUS at 07:52

## 2022-02-21 RX ADMIN — FENTANYL CITRATE 50 MCG: 50 INJECTION, SOLUTION INTRAMUSCULAR; INTRAVENOUS at 07:37

## 2022-02-21 NOTE — DISCHARGE INSTRUCTIONS
Laparoscopic Partial Nephrectomy   WHAT YOU NEED TO KNOW:     Albert Ivy,    Today you had a robot assisted laparoscopic right-sided partial nephrectomy  This went well  Your mass appeared to be limited to the kidney, I saw no signs of spread to other organs  Your tumor was removed entirely with a gross negative margin meaning that there was normal-appearing kidney on the underside of your tumor to ensure that everything was removed  The final pathology will help to guide next steps in terms of follow-up and lab work in the future  Once you have reach the 8-10 week mis postoperatively we will be out of the woods  Typically if you are going to have any issues that will be before that time frame  You will be with us in the hospital for 1 or 2 days I expect should you progress as I think you will given your young age and good health  Should you have any issues while you recover I will try you what these are and how we will face time together  I could not be happier with how your surgery went today, I am optimistic for your outcome and prognosis going forward  Thank you for allowing us to take care of you today,  Dr Magno Nicolas  Laparoscopic partial nephrectomy is surgery to remove part of your kidney  Surgery will be done through small incisions on your side  DISCHARGE INSTRUCTIONS:   Call your local emergency number (911 in the [address] [place] [address],[address]) if:   · You have sudden chest pain or trouble breathing  Seek care immediately if:   · Blood soaks through your bandage  · Your stitches come apart  · You cannot urinate  Call your nephrologist or surgeon if:   · You have a fever  · You have blood in your urine  · Your wound is red, swollen, or draining pus  · You have chills, a cough, or feel weak and achy  · You have questions or concerns about your condition or care  Medicines: You may need any of the following:  · Prescription pain medicine  may be given   Ask your healthcare provider how to take this medicine safely  Some prescription pain medicines contain acetaminophen  Do not take other medicines that contain acetaminophen without talking to your healthcare provider  Too much acetaminophen may cause liver damage  Prescription pain medicine may cause constipation  Ask your healthcare provider how to prevent or treat constipation  · Antibiotics  may be given to prevent or treat an infection caused by bacteria  · Bowel movement softeners  may be given to help prevent constipation  · Take your medicine as directed  Contact your healthcare provider if you think your medicine is not helping or if you have side effects  Tell him or her if you are allergic to any medicine  Keep a list of the medicines, vitamins, and herbs you take  Include the amounts, and when and why you take them  Bring the list or the pill bottles to follow-up visits  Carry your medicine list with you in case of an emergency  Care for the surgery area:   · Do not get your stitches wet unless your surgeon says it is okay  When you are allowed to bathe, carefully wash the area with soap and water  Gently pat the area dry and put on new, clean bandages as directed  Change your bandages when they get wet or dirty  · You may have pieces of medical tape on your incision wound  Keep them clean and dry  They will fall off on their own after several days  Do not pull them off  Drink liquids as directed: This will help prevent constipation  Ask your healthcare provider how much liquid to drink each day and which liquids are best for you  Activity:  Do not lift, pull, or push heavy objects  You may also need to limit movement, such as bending your back or twisting  Ask when you can return to work and do other activities  Follow up with your nephrologist or surgeon as directed: You will need to return to have your wound checked and stitches removed   Write down your questions so you remember to ask them during your visits  © Copyright [employer] 2021 Information is for End User's use only and may not be sold, redistributed or otherwise used for commercial purposes  All illustrations and images included in CareNotes® are the copyrighted property of A D A M [employer] Inc  or Avni Pandey  The above information is an  only  It is not intended as medical advice for individual conditions or treatments  Talk to your doctor, nurse or pharmacist before following any medical regimen to see if it is safe and effective for you

## 2022-02-21 NOTE — INTERVAL H&P NOTE
H&P reviewed  After examining the patient I find no changes in the patients condition since the H&P had been written      Vitals:    02/21/22 0657   BP: 131/60   Pulse: 99   Resp: 20   Temp: 98 3 °F (36 8 °C)   SpO2: 100%     Proceed to right partial nephrectomy, robot assisted

## 2022-02-21 NOTE — OP NOTE
OPERATIVE REPORT  PATIENT NAME: Melita Garnica    :  1985  MRN: [id number]  Pt Location: AN OR ROOM 04    SURGERY DATE: 2022    Surgeon(s) and Role:     * Dameon Leung MD - Primary     * Alexa Nation PA-C - Shara Hanks MD - Assisting    Please note that the physician associates as well as a 2nd board certified urologist were necessary during today's case for exposing appropriate anatomy, changing robotic instruments, and helping with this dissection given the nature of today's case    Preop Diagnosis:  Right sided abdominal pain [R10 9]  Renal mass, right [N28 89]    Post-Op Diagnosis Codes:     * Right sided abdominal pain [R10 9]     * Renal mass, right [N28 89]    Procedure(s) (LRB):  ROBOTIC LAPAROSCOPIC PARTIAL NEPHRECTOMY (Right)    Specimen(s):  ID Type Source Tests Collected by Time Destination   1 : RIGHT RENAL MASS Tissue Soft Tissue, Other TISSUE EXAM Dameon Leung MD 2022 [id number]        Estimated Blood Loss:   100 mL    Drains:  Closed/Suction Drain Right RLQ Bulb 19 Fr  (Active)   Number of days: 0       NG/OG/Enteral Tube Orogastric 18 Fr Center mouth (Active)   Number of days: 0       Urethral Catheter Latex 16 Fr  (Active)   Number of days: 0       Anesthesia Type:   General    Operative Indications:  Right sided abdominal pain [R10 9]  Renal mass, right [N28 89]      Operative Findings:  A lower pole renal mass which is anterior/lateral is noted the right lower pole of the kidney  No signs of local or regional spread, the hilum was dissected and a bulldog clamp was placed for 9 minutes and 57 seconds during excision of the mass and renorrhaphy  No complications  Estimated blood loss 100 milliliters  Approximately 90% of healthy renal parenchyma remains after today's case    Complications:   None    Procedure and Technique:      INDICATIONS FOR PROCEDURE:  Maryanne Dinero is an 43 y o  old female with a right renal mass measuring 1 7 cm    After discussing the options, the patient elected to undergo a robotic assisted laparoscopic partial nephrectomy  We discussed the procedure in detail, the alternatives, and the risks, and they signed informed consent to proceed  The benefits include removal of the kidney mass with preservation of as much healthy renal parenchyma as possible and obtaining tissue for pathologic and prognostic information  Risks of partial nephrectomy include infection, bleeding, pain, damage to surrounding structures, risk of tumor violation with potential tumor spread, risk of incomplete resection, risk of inducing chronic kidney disease via the surgical removal of nephrons, risk of delayed bleeding (arteriovenous malformation and pseudoaneurysm), risk of urine leak and fistula possibly requiring secondary intervention, positioning complications (chronic pain, neurapraxia, paralysis, rhabdomyolysis, compartment syndrome), and the risk of general anesthesia  The alternative to partial nephrectomy is radical nephrectomy, ablative therapies, or no surgery  PROCEDURE SUMMARY:  The patient was brought to the operating room and general anesthesia was induced  A 16 F Connors catheter was placed for bladder decompression  The patient was then placed in the left lateral decubitus position with the right side, the correct side, facing up, and prepped and draped using standard sterile technique after being secured to the surgical table with surgical towels and tape  All pressure points were carefully padded and there was no undue pressure on the muscle belly, nerve structure, or bony prominence  Antibiotics were administered, and thromboembolism prophylaxis was given as per the guidelines  A surgical time out was performed with all in the room in agreement with the correct patient, procedure, indications, and laterality  A Veress needle was then placed into the abdomen to inflate the abdomen to 15 millimeters of mercury    Good positioning was confirmed with the saline drip test then pneumoperitoneum was obtained to 15 mmHg  A 8 mm axially dilating trochar was then passed and the robotic camera was inserted and the viscera examined  No evidence of adjacent organ injury was noted  A total of 3 additional 8 millimeter ports were placed for the typical for robotic arms, a 5 millimeters system port was placed  A 12 millimeter AirSeal port was also placed      The right colon was mobilized along the line of Toldt using a combination of sharp and blunt dissection  The inferior vena cava was identified and the duodenum Kocherized  The right kidney was then carefully dissected and the renal hilum identified  The ureter and adrenal gland were identified as well and did not require dissection given the lower pole location of the mass  The kidney was then defatted and the renal hilum skeletonized to allow for accurate clamping of the renal vasculature  A vessel loop was placed around the artery to allow for traction and a inferior fashion given that this was directly posterior to a large venous complex  The tumor was clearly identified on the kidney  Intraoperative ultrasound was used, intraoperative interpretation was also performed  The renal cortex surrounding the tumor was scored with electrocautery  A 2nd intraoperative time-out was performed during which we confirmed that all necessary tools, sutures, and hemostatic materials were present and ready for use in partial nephrectomy  After this was completed the renal hilum was clamped with atraumatic vascular bulldog clamps and a stopwatch was started  Using a combination of blunt and sharp dissection, the tumor was resected and placed temporarily behind the kidney taking care not to violate it   The renorrhaphy was then performed in 2 running layers using 3-0 V-lock suture through the tumor defect, and multiple 0 Stratafix stitches on CT 1 needles were used via the sliding Weck clip technique to perform the final renorrhaphy  Once adequate renal parenchymal compression was achieved, the vascular clamping was then removed and the stopwatch was stopped  The kidney was observed for 5 minutes to ensure adequate perfusion and to rule out bleeding  Total clamp time was 9 minutes and 57 seconds  Floseal was then applied after which Surgicel bolsters were placed over the area of renorhapphy  The kidney became immediately firm and pink and showed no evidence of vascular compromise  The mass was then placed in a 10 mm Endocatch bag along with any perirenal fat that was adjacent to the tumor  Insufflation pressure was then dropped to 5 mmHg and the surgical site was again examined for any signs of bleeding  No evidence of active bleeding was noted  The surrounding structures show no evidence of iatrogenic injury  A surgical drain was placed  The robotic arms were then undocked and the pneumoperitoneum deflated  Ports were removed  The assistant port was extended to approximately 2 cm and the rectus sheath was incised along a similar length  The mass was then removed intact within the Endocatch bag and sent for pathology  The fascia was closed with running absorbable suture     Subcutaneous fat was closed with interrupted 2-0 Vicryl suture and the skin with subcuticular 4-0 Monocryl  All other port sites were closed at the skin only using subcuticular 4-0 Monocryl and dermal glue  Sterile dressings were then applied  There were no complications  All instrument and sponge counts were correct  DISPOSITION:   PACU - hemodynamically stable  Plan:  After admission for postoperative recovery the patient will be discharged home with a plan for follow-up in the office in 2-3 weeks to review pathology from today's partial nephrectomy    Wounds will be checked at that time and a plan for follow-up as per the NCCN guidelines will be established at that time pending pathology results         I was present for the entire procedure and A qualified resident physician was not available    Patient Disposition:  PACU       SIGNATURE: Boaz Wade MD  DATE: February 21, 2022  TIME: 9:28 AM

## 2022-02-21 NOTE — ANESTHESIA PREPROCEDURE EVALUATION
Procedure:  ROBOTIC LAPAROSCOPIC PARTIAL NEPHRECTOMY (Right Abdomen)    Relevant Problems   NEURO/PSYCH   (+) History of 2019 novel coronavirus disease (COVID-19)   (+) History of anxiety        Physical Exam    Airway    Mallampati score: II  TM Distance: >3 FB  Neck ROM: full     Dental   No notable dental hx     Cardiovascular  Cardiovascular exam normal    Pulmonary  Pulmonary exam normal     Other Findings        Anesthesia Plan  ASA Score- 2     Anesthesia Type- general with ASA Monitors  Additional Monitors: arterial line  Airway Plan: ETT  Plan Factors-Exercise tolerance (METS): >4 METS  Patient is not a current smoker  Patient did not smoke on day of surgery  Induction- intravenous  Postoperative Plan- Plan for postoperative opioid use  Informed Consent- Anesthetic plan and risks discussed with patient  I personally reviewed this patient with the CRNA  Discussed and agreed on the Anesthesia Plan with the CRNA  Delfina Arita

## 2022-02-21 NOTE — ANESTHESIA POSTPROCEDURE EVALUATION
Post-Op Assessment Note    CV Status:  Stable    Pain management: adequate     Mental Status:  Alert and awake   Hydration Status:  Stable   PONV Controlled:  None   Airway Patency:  Patent      Post Op Vitals Reviewed: Yes      Staff: Anesthesiologist, CRNA         No complications documented      BP   129/79   Temp   97 0   Pulse  78   Resp   14   SpO2   98

## 2022-02-22 VITALS
SYSTOLIC BLOOD PRESSURE: 119 MMHG | WEIGHT: 160.94 LBS | HEART RATE: 79 BPM | TEMPERATURE: 98.4 F | RESPIRATION RATE: 18 BRPM | DIASTOLIC BLOOD PRESSURE: 81 MMHG | BODY MASS INDEX: 25.26 KG/M2 | HEIGHT: 67 IN | OXYGEN SATURATION: 100 %

## 2022-02-22 LAB
ANION GAP SERPL CALCULATED.3IONS-SCNC: 6 MMOL/L (ref 4–13)
BUN SERPL-MCNC: 7 MG/DL (ref 5–25)
CALCIUM SERPL-MCNC: 8.5 MG/DL (ref 8.3–10.1)
CHLORIDE SERPL-SCNC: 105 MMOL/L (ref 100–108)
CO2 SERPL-SCNC: 28 MMOL/L (ref 21–32)
CREAT FLD-MCNC: 0.75 MG/DL
CREAT SERPL-MCNC: 0.82 MG/DL (ref 0.6–1.3)
ERYTHROCYTE [DISTWIDTH] IN BLOOD BY AUTOMATED COUNT: 13.2 % (ref 11.6–15.1)
GFR SERPL CREATININE-BSD FRML MDRD: 91 ML/MIN/1.73SQ M
GLUCOSE SERPL-MCNC: 98 MG/DL (ref 65–140)
HCT VFR BLD AUTO: 31.9 % (ref 34.8–46.1)
HGB BLD-MCNC: 10.3 G/DL (ref 11.5–15.4)
MCH RBC QN AUTO: 29 PG (ref 26.8–34.3)
MCHC RBC AUTO-ENTMCNC: 32.3 G/DL (ref 31.4–37.4)
MCV RBC AUTO: 90 FL (ref 82–98)
PLATELET # BLD AUTO: 187 THOUSANDS/UL (ref 149–390)
PMV BLD AUTO: 10.5 FL (ref 8.9–12.7)
POTASSIUM SERPL-SCNC: 3.5 MMOL/L (ref 3.5–5.3)
RBC # BLD AUTO: 3.55 MILLION/UL (ref 3.81–5.12)
SODIUM SERPL-SCNC: 139 MMOL/L (ref 136–145)
WBC # BLD AUTO: 9.47 THOUSAND/UL (ref 4.31–10.16)

## 2022-02-22 PROCEDURE — 80048 BASIC METABOLIC PNL TOTAL CA: CPT | Performed by: UROLOGY

## 2022-02-22 PROCEDURE — NC001 PR NO CHARGE: Performed by: NURSE PRACTITIONER

## 2022-02-22 PROCEDURE — 99024 POSTOP FOLLOW-UP VISIT: CPT | Performed by: NURSE PRACTITIONER

## 2022-02-22 PROCEDURE — 85027 COMPLETE CBC AUTOMATED: CPT | Performed by: UROLOGY

## 2022-02-22 PROCEDURE — 82570 ASSAY OF URINE CREATININE: CPT | Performed by: NURSE PRACTITIONER

## 2022-02-22 RX ORDER — DICYCLOMINE HYDROCHLORIDE 10 MG/1
10 CAPSULE ORAL EVERY 8 HOURS PRN
Qty: 15 CAPSULE | Refills: 0 | Status: SHIPPED | OUTPATIENT
Start: 2022-02-22 | End: 2022-07-05

## 2022-02-22 RX ADMIN — ACETAMINOPHEN 650 MG: 325 TABLET, FILM COATED ORAL at 11:12

## 2022-02-22 RX ADMIN — DICYCLOMINE HYDROCHLORIDE 10 MG: 10 CAPSULE ORAL at 08:07

## 2022-02-22 RX ADMIN — PANTOPRAZOLE SODIUM 20 MG: 20 TABLET, DELAYED RELEASE ORAL at 07:51

## 2022-02-22 RX ADMIN — ACETAMINOPHEN 650 MG: 325 TABLET, FILM COATED ORAL at 05:31

## 2022-02-22 NOTE — DISCHARGE SUMMARY
DISCHARGE SUMMARY    Patient Name: Ward Angelucci  Patient MRN: [id number]  Admitting Provider: Zackary Medina MD  Discharging Provider: Zackary Medina MD  [facility] Dis[phone]   Admission Date: 2/21/2022       Discharge Date: 02/22/22      Admission Diagnosis   Right sided abdominal pain [R10 9]  Renal mass, right [N28 89]    Discharge Diagnoses  Patient Active Problem List   Diagnosis    S/P removal of left ovary    History of anxiety    Change in bowel habits    Epigastric abdominal tenderness    Right sided abdominal pain    Renal mass, right    History of 2019 novel coronavirus disease (COVID-19)       Hospital Course  Patient is known to group for onset of flank pain  Patient has undergone office evaluation including diagnostic imaging for such  Unfortunately, patient's CT scan is demonstrative for right  renal mass suspicious for neoplasm  Patient verbalized understanding of surgical approach, risks vs  benefits and possible unintended complications as mentioned above and desired to proceed with Robotic Laparascopic Assisted partial  Nephrectomy  Refer to operative report  This procedure went well without adverse events or complications  Patient was afebrile, hemodynamically stable and adequately pain controlled throughout hospital stay  Patient tolerated diet advancement without evidence of nausea/vomiting  Abdominal puncture sites clean dry and intact with Dermabond without evidence of dehiscence  Patient side abdominal binder  She ambulated  with minimal assistance multiple times throughout postoperative day 1  Connors catheter was removed early in the morning  Patient was voiding spontaneously without irritative obstructive urinary symptoms  SUSANNAH drain creatinine consistent with serum--removed  She was deemed  stable for discharge the afternoon of postoperative day 1    She verbalized understanding of all discharge instructions provided:  Including medications, diet, activity limitations/restrictions, signs and symptoms to report and follow-up        Medications  Current Discharge Medication List      CONTINUE these medications which have NOT CHANGED    Details   multivitamin (THERAGRAN) TABS Take 1 tablet by mouth daily      norethindrone-ethinyl estradiol (Junel FE 1/20) 1-20 MG-MCG per tablet Take 1 tablet by mouth daily  Qty: 84 tablet, Refills: 3    Associated Diagnoses: Healthcare maintenance      pantoprazole (PROTONIX) 20 mg tablet Take 1 tablet (20 mg total) by mouth daily before breakfast  Qty: 30 tablet, Refills: 1    Associated Diagnoses: Epigastric abdominal tenderness, rebound tenderness presence not specified      triamcinolone (KENALOG) 0 5 % ointment Apply topically 2 (two) times a day for 3 days As needed for itching  Qty: 15 g, Refills: 0    Associated Diagnoses: Mosquito bite, sequela           Current Discharge Medication List      START taking these medications    Details   docusate sodium (COLACE) 100 mg capsule Take 1 capsule (100 mg total) by mouth 3 (three) times a day for 14 days  Qty: 42 capsule, Refills: 0    Associated Diagnoses: Renal mass, right      oxyCODONE (Roxicodone) 5 immediate release tablet Take 1 tablet (5 mg total) by mouth every 4 (four) hours as needed for moderate pain for up to 5 days Max Daily Amount: 30 mg  Qty: 8 tablet, Refills: 0    Associated Diagnoses: Renal mass, right               Allergies  Allergies   Allergen Reactions    Neosporin [Neomycin-Bacitracin Zn-Polymyx] Hives    Pollen Extract Allergic Rhinitis       Outpatient Follow-Up  Future Appointments   Date Time Provider Lencho Padilla   3/14/2022  4:00 PM Vernon Goltz, MD [facility]   4/6/2022  1:00 PM Graeme Edouard PA-C [facility]   6/14/2022  1:20 PM Xenia Beatty MD [facility]   6/21/2022  1:30 PM DO LE Yan Rai BE  [facility]       Active Issues Requiring Follow-Up  none    Test Results Pending at Discharge  Pending Labs     Order Current Status    Tissue Exam In process            Discharge Disposition  home    Treatments    Consults   none    Procedures   percutaneous drainage catheter placement--removed prior to discharge  Operative Procedures Performed  Procedure(s):  RIGHT ROBOTIC LAPAROSCOPIC PARTIAL NEPHRECTOMY    Pertinent Test Results   Lab Results   Component Value Date    WBC 9 47 02/22/2022    HGB 10 3 (L) 02/22/2022    HCT 31 9 (L) 02/22/2022    MCV 90 02/22/2022     02/22/2022     Lab Results   Component Value Date    SODIUM 139 02/22/2022    K 3 5 02/22/2022     02/22/2022    CO2 28 02/22/2022    BUN 7 02/22/2022    CREATININE 0 82 02/22/2022    GLUC 98 02/22/2022    CALCIUM 8 5 02/22/2022       Pathology Report Pending    Physical Exam at Discharge  Condition of Patient on Discharge: stable  /81   Pulse 79   Temp 98 4 °F (36 9 °C)   Resp 18   Ht 5' 7" (1 702 m)   Wt 73 kg (160 lb 15 oz)   SpO2 100%   BMI 25 21 kg/m²   General appearance: alert and oriented, in no acute distress, appears stated age, cooperative and no distress  Head: Normocephalic, without obvious abnormality, atraumatic  Neck: no adenopathy, no carotid bruit, no JVD, supple, symmetrical, trachea midline and thyroid not enlarged, symmetric, no tenderness/mass/nodules  Lungs: clear to auscultation bilaterally  Heart: regular rate and rhythm, S1, S2 normal, no murmur, click, rub or gallop  Abdomen: abnormal findings:  mild and Puncture sites clean dry and intact with Dermabond  No evidence of dehiscence tenderness in the lower abdomen and SUSANNAH drain--serosanguineous  Removed prior to discharge  Extremities: extremities normal, warm and well-perfused; no cyanosis, clubbing, or edema  Pulses: 2+ and symmetric  Neurologic: Grossly normal  Connors--clear yellow urine  Removed prior to discharge  Voiding volitionally without complaints        I/O last 3 completed shifts: In: 5003 [P O :240;  I V :2000; IV Piggyback:50]  Out: 2125 [Urine:1750; Drains:275; Blood:100]  I/O this shift:  In: 1105 [P O :480; I V :625]  Out: -           DOUG Parker

## 2022-02-22 NOTE — PLAN OF CARE
Problem: PAIN - ADULT  Goal: Verbalizes/displays adequate comfort level or baseline comfort level  Description: Interventions:  - Encourage patient to monitor pain and request assistance  - Assess pain using appropriate pain scale  - Administer analgesics based on type and severity of pain and evaluate response  - Implement non-pharmacological measures as appropriate and evaluate response  - Consider cultural and social influences on pain and pain management  - Notify physician/advanced practitioner if interventions unsuccessful or patient reports new pain  2/22/2022 1700 by Emilee Stuart RN  Outcome: Completed  2/22/2022 1659 by Emilee Stuart RN  Outcome: Progressing     Problem: INFECTION - ADULT  Goal: Absence or prevention of progression during hospitalization  Description: INTERVENTIONS:  - Assess and monitor for signs and symptoms of infection  - Monitor lab/diagnostic results  - Monitor all insertion sites, i e  indwelling lines, tubes, and drains  - Monitor endotracheal if appropriate and nasal secretions for changes in amount and color  - [place] appropriate cooling/warming therapies per order  - Administer medications as ordered  - Instruct and encourage patient and family to use good hand hygiene technique  - Identify and instruct in appropriate isolation precautions for identified infection/condition  2/22/2022 1700 by Emilee Stuart RN  Outcome: Completed  2/22/2022 1659 by Emilee Stuart RN  Outcome: Progressing  Goal: Absence of fever/infection during neutropenic period  Description: INTERVENTIONS:  - Monitor WBC    2/22/2022 1700 by Emilee Stuart RN  Outcome: Completed  2/22/2022 1659 by Emilee Stuart RN  Outcome: Progressing     Problem: SAFETY ADULT  Goal: Patient will remain free of falls  Description: INTERVENTIONS:  - Educate patient/family on patient safety including physical limitations  - Instruct patient to call for assistance with activity   - Consult OT/PT to assist with strengthening/mobility   - Keep Call bell within reach  - Keep bed low and locked with side rails adjusted as appropriate  - Keep care items and personal belongings within reach  - Initiate and maintain comfort rounds  - Make Fall Risk Sign visible to staff  - Offer Toileting every  Hours, in advance of need  - Initiate/Maintain alarm  - Obtain necessary fall risk management equipment:   - Apply yellow socks and bracelet for high fall risk patients  - Consider moving patient to room near nurses station  2/22/2022 1700 by Zackery Stearns RN  Outcome: Completed  2/22/2022 1659 by Zackery Stearns RN  Outcome: Progressing  Goal: Maintain or return to baseline ADL function  Description: INTERVENTIONS:  -  Assess patient's ability to carry out ADLs; assess patient's baseline for ADL function and identify physical deficits which impact ability to perform ADLs (bathing, care of mouth/teeth, toileting, grooming, dressing, etc )  - Assess/evaluate cause of self-care deficits   - Assess range of motion  - Assess patient's mobility; develop plan if impaired  - Assess patient's need for assistive devices and provide as appropriate  - Encourage maximum independence but intervene and supervise when necessary  - Involve family in performance of ADLs  - Assess for home care needs following discharge   - Consider OT consult to assist with ADL evaluation and planning for discharge  - Provide patient education as appropriate  2/22/2022 1700 by Zackery Stearns RN  Outcome: Completed  2/22/2022 1659 by Zackery Stearns RN  Outcome: Progressing  Goal: Maintains/Returns to pre admission functional level  Description: INTERVENTIONS:  - Perform BMAT or MOVE assessment daily    - Set and communicate daily mobility goal to care team and patient/family/caregiver  - Collaborate with rehabilitation services on mobility goals if consulted  - Perform Range of Motion  times a day  - Reposition patient every  hours    - Dangle patient  times a day  - Stand patient  times a day  - Ambulate patient  times a day  - Out of bed to chair  times a day   - Out of bed for meals  times a day  - Out of bed for toileting  - Record patient progress and toleration of activity level   2/22/2022 1700 by Tyrone Osorio RN  Outcome: Completed  2/22/2022 1659 by Tyrone Osorio RN  Outcome: Progressing     Problem: DISCHARGE PLANNING  Goal: Discharge to home or other facility with appropriate resources  Description: INTERVENTIONS:  - Identify barriers to discharge w/patient and caregiver  - Arrange for needed discharge resources and transportation as appropriate  - Identify discharge learning needs (meds, wound care, etc )  - Arrange for interpretive services to assist at discharge as needed  - Refer to Case Management Department for coordinating discharge planning if the patient needs post-hospital services based on physician/advanced practitioner order or complex needs related to functional status, cognitive ability, or social support system  2/22/2022 1700 by Tyrone Osorio RN  Outcome: Completed  2/22/2022 1659 by Tyrone Osorio RN  Outcome: Progressing     Problem: Knowledge Deficit  Goal: Patient/family/caregiver demonstrates understanding of disease process, treatment plan, medications, and discharge instructions  Description: Complete learning assessment and assess knowledge base    Interventions:  - Provide teaching at level of understanding  - Provide teaching via preferred learning methods  2/22/2022 1700 by Tyrone Osorio RN  Outcome: Completed  2/22/2022 1659 by Tyrone Osorio RN  Outcome: Progressing

## 2022-02-22 NOTE — PROGRESS NOTES
Progress Note - Lydia Rivas 43 y o  female MRN: [id number]    Unit/Bed#: W -01 Encounter: [id number]      Assessment:  Lydia Rivas is a 20-year-old female with right renal mass  Postoperative day 1 robotic assisted laparoscopic partial right nephrectomy  She is afebrile, hemodynamically stable with complaints of incisional pain--to be expected  However she is in no apparent distress  Abdominal puncture sites clean dry and intact with Dermabond  No evidence of dehiscence  Excellent renal function  No leukocytosis  Hemoglobin stable 10 3  275 mL SUSANNAH drain output overnight  1 75 L urinary output  Connors in-situ and patent for grossly clear yellow urine  Plan:  · Continue routine postoperative care which should consist of:   · monitoring diet tolerance  · Surveillance of vital signs  · Incentive spirometry and DVT prophylaxis for minimization of postoperative complications  · Pain control  · Remove Connors catheter  Monitor void  · Send SUSANNAH drain for creatinine  · Aggressive ambulation  Obtain abdominal binder  Get patient up into the room and as well as in the hallway  Out of bed for all meals  · Will re-evaluate this afternoon  Subjective:   Denies fever, chills  Reports incisional related pain, soreness  Denies nausea/vomiting  Objective:     Vitals: Blood pressure 142/92, pulse 75, temperature 98 6 °F (37 °C), resp  rate 18, height 5' 7" (1 702 m), weight 73 kg (160 lb 15 oz), SpO2 99 %, not currently breastfeeding  ,Body mass index is 25 21 kg/m²        Intake/Output Summary (Last 24 hours) at 2/22/2022 0920  Last data filed at 2/22/2022 0130  Gross per 24 hour   Intake 1290 ml   Output 2025 ml   Net -735 ml       Physical Exam: General appearance: alert and oriented, in no acute distress, appears stated age, cooperative and no distress  Head: Normocephalic, without obvious abnormality, atraumatic  Neck: no adenopathy, no carotid bruit, no JVD, supple, symmetrical, trachea midline and thyroid not enlarged, symmetric, no tenderness/mass/nodules  Lungs: clear to auscultation bilaterally  Heart: regular rate and rhythm, S1, S2 normal, no murmur, click, rub or gallop  Abdomen: abnormal findings:  moderate tenderness in the lower abdomen  Extremities: extremities normal, warm and well-perfused; no cyanosis, clubbing, or edema  Pulses: 2+ and symmetric  Neurologic: Grossly normal  Connors--grossly clear yellow urine     Invasive Devices  Report    Peripheral Intravenous Line            Peripheral IV Right Wrist -- days          Drain            Closed/Suction Drain Right RLQ Bulb 19 Fr  1 day    Urethral Catheter Latex 16 Fr  1 day                Lab, Imaging and other studies: I have personally reviewed pertinent reports  VTE Pharmacologic Prophylaxis: RX contraindicated due to: Partial nephrectomy  High risk of bleeding    VTE Mechanical Prophylaxis: sequential compression device

## 2022-02-22 NOTE — PLAN OF CARE
Problem: PAIN - ADULT  Goal: Verbalizes/displays adequate comfort level or baseline comfort level  Description: Interventions:  - Encourage patient to monitor pain and request assistance  - Assess pain using appropriate pain scale  - Administer analgesics based on type and severity of pain and evaluate response  - Implement non-pharmacological measures as appropriate and evaluate response  - Consider cultural and social influences on pain and pain management  - Notify physician/advanced practitioner if interventions unsuccessful or patient reports new pain  Outcome: Progressing     Problem: INFECTION - ADULT  Goal: Absence or prevention of progression during hospitalization  Description: INTERVENTIONS:  - Assess and monitor for signs and symptoms of infection  - Monitor lab/diagnostic results  - Monitor all insertion sites, i e  indwelling lines, tubes, and drains  - Monitor endotracheal if appropriate and nasal secretions for changes in amount and color  - [place] appropriate cooling/warming therapies per order  - Administer medications as ordered  - Instruct and encourage patient and family to use good hand hygiene technique  - Identify and instruct in appropriate isolation precautions for identified infection/condition  Outcome: Progressing  Goal: Absence of fever/infection during neutropenic period  Description: INTERVENTIONS:  - Monitor WBC    Outcome: Progressing     Problem: SAFETY ADULT  Goal: Patient will remain free of falls  Description: INTERVENTIONS:  - Educate patient/family on patient safety including physical limitations  - Instruct patient to call for assistance with activity   - Consult OT/PT to assist with strengthening/mobility   - Keep Call bell within reach  - Keep bed low and locked with side rails adjusted as appropriate  - Keep care items and personal belongings within reach  - Initiate and maintain comfort rounds  - Make Fall Risk Sign visible to staff  - Offer Toileting every  Hours, in advance of need  - Initiate/Maintain alarm  - Obtain necessary fall risk management equipment:   - Apply yellow socks and bracelet for high fall risk patients  - Consider moving patient to room near nurses station  Outcome: Progressing  Goal: Maintain or return to baseline ADL function  Description: INTERVENTIONS:  -  Assess patient's ability to carry out ADLs; assess patient's baseline for ADL function and identify physical deficits which impact ability to perform ADLs (bathing, care of mouth/teeth, toileting, grooming, dressing, etc )  - Assess/evaluate cause of self-care deficits   - Assess range of motion  - Assess patient's mobility; develop plan if impaired  - Assess patient's need for assistive devices and provide as appropriate  - Encourage maximum independence but intervene and supervise when necessary  - Involve family in performance of ADLs  - Assess for home care needs following discharge   - Consider OT consult to assist with ADL evaluation and planning for discharge  - Provide patient education as appropriate  Outcome: Progressing  Goal: Maintains/Returns to pre admission functional level  Description: INTERVENTIONS:  - Perform BMAT or MOVE assessment daily    - Set and communicate daily mobility goal to care team and patient/family/caregiver  - Collaborate with rehabilitation services on mobility goals if consulted  - Perform Range of Motion  times a day  - Reposition patient every  hours    - Dangle patient  times a day  - Stand patient  times a day  - Ambulate patient  times a day  - Out of bed to chair  times a day   - Out of bed for meals times a day  - Out of bed for toileting  - Record patient progress and toleration of activity level   Outcome: Progressing     Problem: DISCHARGE PLANNING  Goal: Discharge to home or other facility with appropriate resources  Description: INTERVENTIONS:  - Identify barriers to discharge w/patient and caregiver  - Arrange for needed discharge resources and transportation as appropriate  - Identify discharge learning needs (meds, wound care, etc )  - Arrange for interpretive services to assist at discharge as needed  - Refer to Case Management Department for coordinating discharge planning if the patient needs post-hospital services based on physician/advanced practitioner order or complex needs related to functional status, cognitive ability, or social support system  Outcome: Progressing     Problem: Knowledge Deficit  Goal: Patient/family/caregiver demonstrates understanding of disease process, treatment plan, medications, and discharge instructions  Description: Complete learning assessment and assess knowledge base    Interventions:  - Provide teaching at level of understanding  - Provide teaching via preferred learning methods  Outcome: Progressing

## 2022-02-22 NOTE — TREATMENT PLAN
Rey Ontiveros is a 17-year-old female with right renal mass  Postoperative day 1 robotic assisted laparoscopic right partial nephrectomy  Slow to ambulate this a m     Bed after providing abdominal binder and with several verbal cues, patient was encouraged to begin walking in room and later in hallway  She remains afebrile, hemodynamically stable and adequately pain controlled  SUSANNAH for creatinine sent earlier this a m , finally resulted  Normal inconsistent with serum---0 75 mg/ dL  SUSANNAH drain removed at bedside without incident  Plan:  Continue postop standard of care  Vital signs  Pain control  DVT prophylaxis  Aggressive ambulation  Monitor voiding post catheter removal  Will plan for discharge tonight versus tomorrow

## 2022-02-22 NOTE — UTILIZATION REVIEW
Initial Clinical Review    Elective inpatient surgical procedure  Age/Sex: 43 y o  female with R renal mass  Surgery Date: 2/21/22 @1511  Procedure: ROBOTIC LAPAROSCOPIC PARTIAL NEPHRECTOMY (Right)  [facility]: General   Operative Findings: A lower pole renal mass which is anterior/lateral is noted the right lower pole of the kidney  No signs of local or regional spread, the hilum was dissected and a bulldog clamp was placed for 9 minutes and 57 seconds during excision of the mass and renorrhaphy  No complications  Estimated blood loss 100 milliliters  Approximately 90% of healthy renal parenchyma remains after today's case    POD#1 Progress Note: 2/22/22  Pt afebrile  Has incisional pain,moderate tenderness lower abdoman 6/10 per nursing notes this am  Pt receiving scheduled acetaminophen for pain today  Abdominal puncture sites C/D/I with Dermabond  Labs w/o leukocytosis, creat 0 82 and Hgb stable  SUSANNAH patent for 275 ml drainage   Guadalupe cath in place with grossly clear yellow urine 1 75 L UOP  Plan - continue to monitor diet tolerance, monitor vitals, pain control  Send SUSANNAH drain fluid for creat  Aggressive ambulation  Abdominal binder ordered  Yellow Medicine Diaz Spirometry, DVT ppx  Remove guadalupe cath and monitor void  IVF d/c'ed today  Admission Orders: Date/Time/Statement:   Admission Orders (From admission, onward)     Ordered        02/21/22 0706  Inpatient Admission  Once                      Orders Placed This Encounter   Procedures    Inpatient Admission     Standing Status:   Standing     Number of Occurrences:   1     Order Specific Question:   Level of Care     Answer:   Med Surg [16]     Order Specific Question:   Estimated length of stay     Answer:   More than 2 Midnights     Order Specific Question:   Certification     Answer:   I certify that inpatient services are medically necessary for this patient for a duration of greater than two midnights   See H&P and MD Progress Notes for additional information about the patient's course of treatment       Vital Signs: /79 (BP Location: Right arm)   Pulse 83   Temp 98 3 °F (36 8 °C) (Oral)   Resp 20   Ht 5' 7" (1 702 m)   Wt 73 kg (160 lb 15 oz)   SpO2 97%   BMI 25 21 kg/m²     Pertinent Labs/Diagnostic Test Results:   Results from last 7 days   Lab Units 02/16/22  0759   SARS-COV-2  Negative     Results from last 7 days   Lab Units 02/22/22  0502 02/21/22  1022   WBC Thousand/uL 9 47 10 63*   HEMOGLOBIN g/dL 10 3* 11 8   HEMATOCRIT % 31 9* 36 3   PLATELETS Thousands/uL 187 233         Results from last 7 days   Lab Units 02/22/22  0502 02/21/22  1022   SODIUM mmol/L 139 140   POTASSIUM mmol/L 3 5 3 5   CHLORIDE mmol/L 105 105   CO2 mmol/L 28 26   ANION GAP mmol/L 6 9   BUN mg/dL 7 11   CREATININE mg/dL 0 82 0 93   EGFR ml/min/1 73sq m 91 78   CALCIUM mg/dL 8 5 8 4             Results from last 7 days   Lab Units 02/22/22  0502 02/21/22  1022   GLUCOSE RANDOM mg/dL 98 148*                   Diet: Reg house  Mobility: ambulation  DVT Prophylaxis: SCD's ambulation    Medications/Pain Control:   Scheduled Medications:  acetaminophen, 650 mg, Oral, Q6H CAROL  docusate sodium, 100 mg, Oral, BID  norgestrel-ethinyl estradiol, 1 tablet, Oral, Daily  pantoprazole, 20 mg, Oral, Daily Before Breakfast  senna, 1 tablet, Oral, Daily  ceFAZolin (ANCEF) IVPB (premix in dextrose) 1,000 mg 50 mL  Dose: 1,000 mg  Freq: Every 8 hours Route: IV  Last Dose: Stopped (02/22/22 0045)  Start: 02/21/22 1600 End: 02/22/22 0045    Continuous IV Infusions:  lactated ringers, 125 mL/hr, Intravenous, ContinuousEnd: 02/22/22 1015      PRN Meds:  aluminum-magnesium hydroxide-simethicone, 30 mL, Oral, Q6H PRN  dicyclomine, 10 mg, Oral, Q8H PRN x1 2/21 x1 2/22  diphenhydrAMINE, 12 5 mg, Oral, Q6H PRN x1 2/21  HYDROmorphone, 0 5 mg, Intravenous, Q2H PRN  lactated ringers, 1,000 mL, Intravenous, Once PRN   And  lactated ringers, 1,000 mL, Intravenous, Once PRN  ondansetron, 4 mg, Intravenous, Q6H PRN x1 2/21  oxyCODONE, 10 mg, Oral, Q4H PRN  oxyCODONE, 5 mg, Oral, Q4H PRN x1 2/21  sodium chloride, 1,000 mL, Intravenous, Once PRN   And  sodium chloride, 1,000 mL, Intravenous, Once PRN        Network Utilization Review Department  ATTENTION: Please call with any questions or concerns to [phone] and carefully listen to the prompts so that you are directed to the right person  All voicemails are confidential   Floresita Booth all requests for admission clinical reviews, approved or denied determinations and any other requests to dedicated fax number below belonging to the campus where the patient is receiving treatment   List of dedicated fax numbers for the Facilities:  [address] DENIALS (Administrative/Medical Necessity) [phone]   [address] (Maternity/NICU/Pediatrics) [phone]   [address] [place] [address]  [address] Medical [place] [address] [address] [address] [place] [address] Tila [address] [facility] [facility] [phone]

## 2022-02-23 ENCOUNTER — TRANSITIONAL CARE MANAGEMENT (OUTPATIENT)
Dept: INTERNAL MEDICINE CLINIC | Facility: CLINIC | Age: 37
End: 2022-02-23

## 2022-02-23 NOTE — UTILIZATION REVIEW
Notification of Discharge   This is a Notification of Discharge from our facility [address]  Please be advised that this patient has been discharge from our facility  Below you will find the admission and discharge date and time including the patients disposition  UTILIZATION REVIEW CONTACT:  Sara Valencia  Utilization   Network Utilization Review Department  Phone: [phone] x carefully listen to the prompts  All voicemails are confidential   Email: [email or web address]     PHYSICIAN ADVISORY SERVICES:  FOR [id number] REVIEW - MEDICAL NECESSITY DENIAL  Phone: [phone]  Fax: [phone]  Email: [email or web address]     PRESENTATION DATE: 2/21/2022  6:40 AM  OBERVATION ADMISSION DATE:   INPATIENT ADMISSION DATE: 2/21/22  7:06 AM   DISCHARGE DATE: 2/22/2022  5:58 PM  DISPOSITION: Home/Self Care Home/Self Care      IMPORTANT INFORMATION:  Send all requests for admission clinical reviews, approved or denied determinations and any other requests to dedicated fax number below belonging to the campus where the patient is receiving treatment   List of dedicated fax numbers:  [address] DENIALS (Administrative/Medical Necessity) [phone]   [address] (Maternity/NICU/Pediatrics) [phone]   Rosa Isela Sweet [phone]   Antonia Liz [phone]   [facility] Head [phone]   [address] [place] [address] [address] [place] [address],[address] [place] [address] [phone]   [facility]  [phone]   [address] [facility] [place] [facility]  [address] [facility] 1000 [facility] [phone]

## 2022-02-28 ENCOUNTER — OFFICE VISIT (OUTPATIENT)
Dept: INTERNAL MEDICINE CLINIC | Facility: CLINIC | Age: 37
End: 2022-02-28
Payer: COMMERCIAL

## 2022-02-28 VITALS
SYSTOLIC BLOOD PRESSURE: 126 MMHG | TEMPERATURE: 98.4 F | BODY MASS INDEX: 24.64 KG/M2 | DIASTOLIC BLOOD PRESSURE: 74 MMHG | WEIGHT: 157 LBS | HEART RATE: 82 BPM | HEIGHT: 67 IN | OXYGEN SATURATION: 99 %

## 2022-02-28 DIAGNOSIS — Z09 HOSPITAL DISCHARGE FOLLOW-UP: Primary | ICD-10-CM

## 2022-02-28 DIAGNOSIS — Z80.9 FHX: CANCER: ICD-10-CM

## 2022-02-28 DIAGNOSIS — Z13.6 ENCOUNTER FOR LIPID SCREENING FOR CARDIOVASCULAR DISEASE: ICD-10-CM

## 2022-02-28 DIAGNOSIS — C64.9 CHROMOPHOBE RENAL CELL CARCINOMA (HCC): ICD-10-CM

## 2022-02-28 DIAGNOSIS — Z13.220 ENCOUNTER FOR LIPID SCREENING FOR CARDIOVASCULAR DISEASE: ICD-10-CM

## 2022-02-28 DIAGNOSIS — Z90.5 HISTORY OF PARTIAL NEPHRECTOMY: ICD-10-CM

## 2022-02-28 PROBLEM — R10.9 RIGHT SIDED ABDOMINAL PAIN: Status: RESOLVED | Noted: 2021-11-18 | Resolved: 2022-02-28

## 2022-02-28 PROCEDURE — 3008F BODY MASS INDEX DOCD: CPT | Performed by: INTERNAL MEDICINE

## 2022-02-28 PROCEDURE — 1111F DSCHRG MED/CURRENT MED MERGE: CPT | Performed by: INTERNAL MEDICINE

## 2022-02-28 PROCEDURE — 99495 TRANSJ CARE MGMT MOD F2F 14D: CPT | Performed by: INTERNAL MEDICINE

## 2022-02-28 RX ORDER — ACETAMINOPHEN 325 MG/1
650 TABLET ORAL EVERY 4 HOURS PRN
COMMUNITY
End: 2022-06-20

## 2022-02-28 NOTE — PROGRESS NOTES
TCM Call (since 1/28/2022)     Date and time call was made  2/23/2022  1:10 PM    Hospital care reviewed  Records reviewed        Patient was hospitialized at  [address] [place] [address]        Date of Admission  02/21/22    Date of discharge  02/22/22    Diagnosis  RIGHT RENAL MASS     Disposition  Home      TCM Call (since 1/28/2022)     Scheduled for follow up? 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[address] )  Comments:  f/u urology and will refer for genetic counseling  Orders:  -     Cancel: Ambulatory Referral to Oncology Genetics; Future  -     Ambulatory Referral to Oncology Genetics; Future    FHx: cancer  -     Cancel: Ambulatory Referral to Oncology Genetics; Future  -     Ambulatory Referral to Oncology Genetics; Future    Encounter for lipid screening for cardiovascular disease  -     Lipid Panel with Direct LDL reflex; Future    Other orders  -     acetaminophen (TYLENOL) 325 mg tablet; Take 650 mg by mouth every 4 (four) hours as needed for mild pain          Subjective:      Patient ID: Derek Bueno is a 43 y o  female  HPI    Here for hospital discharge follow up, Elizabet Fong had partial right nephrectomy & path came back as renal cell cancer  She is home and recovering, overall stable  She is monitoring her laparoscopic incision sites and states they are slowly healing  She rec'd a message from urologist about the results showing cancer but that clear margins were achieved  She is tearful about this and recent stressors but has good family and spousal support  She is thinking about getting her COVID booster at this time  She is moving her bowels and has no urinary symptoms or leg swelling    aunt had breast cancer at a young age and is identical twin to her mother(mother passed away young)  ROS Otherwise negative, no other complaints  Past Medical History:   Diagnosis Date    Anemia     Female infertility     IVF pregnacny 2017, IVF 2014    Gestational hypertension, third trimester     resolved: May 12, 2015    Infertility, female     Migraine     Resolved: Nov 3, 2015    Ovarian cyst     removed 2005, 6lb cyst    Preeclampsia     Pregnancy resulting from in vitro fertilization in third trimester 4/6/2018    Seasonal allergies     Type AB blood, Rh positive     Varicella     had as child around 8years old     Visual impairment     eywear      Vitals:    02/28/22 1122 02/28/22 1213   BP: 126/74    BP Location: Left arm    Patient Position: Sitting    Cuff Size: Adult    Pulse: 82    Temp: 100 3 °F (37 9 °C) 98 4 °F (36 9 °C)   TempSrc: Tympanic    SpO2: 99%    Weight: 71 2 kg (157 lb)    Height: 5' 7" (1 702 m)      Body mass index is 24 59 kg/m²      Current Outpatient Medications:     acetaminophen (TYLENOL) 325 mg tablet, Take 650 mg by mouth every 4 (four) hours as needed for mild pain, Disp: , Rfl:     norethindrone-ethinyl estradiol (Junel FE 1/20) 1-20 MG-MCG per tablet, Take 1 tablet by mouth daily, Disp: 84 tablet, Rfl: 3    pantoprazole (PROTONIX) 20 mg tablet, Take 1 tablet (20 mg total) by mouth daily before breakfast, Disp: 30 tablet, Rfl: 1    dicyclomine (BENTYL) 10 mg capsule, Take 1 capsule (10 mg total) by mouth every 8 (eight) hours as needed (abd spasms/cramping) for up to 5 days, Disp: 15 capsule, Rfl: 0    docusate sodium (COLACE) 100 mg capsule, Take 1 capsule (100 mg total) by mouth 3 (three) times a day for 14 days (Patient not taking: Reported on 2/28/2022 ), Disp: 42 capsule, Rfl: 0    multivitamin (THERAGRAN) TABS, Take 1 tablet by mouth daily (Patient not taking: Reported on 2/28/2022 ), Disp: , Rfl:     triamcinolone (KENALOG) 0 5 % ointment, Apply topically 2 (two) times a day for 3 days As needed for itching, Disp: 15 g, Rfl: 0  Allergies   Allergen Reactions    Neosporin [Neomycin-Bacitracin Zn-Polymyx] Hives    Pollen Extract Allergic Rhinitis         Review of Systems   Constitutional: Negative for fever  HENT: Negative for congestion  Eyes: Negative for visual disturbance  Respiratory: Negative for shortness of breath  Cardiovascular: Negative for chest pain  Gastrointestinal: Negative for abdominal pain  Endocrine: Negative for polyuria  Genitourinary: Negative for difficulty urinating and dyspareunia  Musculoskeletal: Negative for arthralgias  Skin: Positive for wound (laparoscopic sites are healing)  Allergic/Immunologic: Negative for immunocompromised state  Neurological: Negative for weakness  Psychiatric/Behavioral: Negative for dysphoric mood (+recent stress)  The patient is not nervous/anxious  Objective:      /74 (BP Location: Left arm, Patient Position: Sitting, Cuff Size: Adult)   Pulse 82   Temp 98 4 °F (36 9 °C)   Ht 5' 7" (1 702 m)   Wt 71 2 kg (157 lb)   SpO2 99%   BMI 24 59 kg/m²          Physical Exam  Vitals reviewed  Constitutional:       Appearance: Normal appearance  HENT:      Head: Normocephalic and atraumatic  Cardiovascular:      Rate and Rhythm: Normal rate and regular rhythm  Heart sounds: No murmur heard  Pulmonary:      Effort: Pulmonary effort is normal       Breath sounds: No wheezing or rales  Abdominal:      General: Bowel sounds are normal       Palpations: Abdomen is soft  Tenderness: There is no abdominal tenderness  Comments: Several laparoscopic incisions that are healing and are C/D/I   Musculoskeletal:      Right lower leg: No edema  Left lower leg: No edema  Neurological:      Mental Status: She is alert  Mental status is at baseline  Psychiatric:         Mood and Affect: Affect is tearful (but not depressed)           Behavior: Behavior normal

## 2022-02-28 NOTE — PATIENT INSTRUCTIONS
1  Cholesterol blood work in about 6 months  2  Genetic counseling appointment  3   Return in 6 months or as needed

## 2022-03-01 LAB
ABO GROUP BLD BPU: NORMAL
ABO GROUP BLD BPU: NORMAL
BPU ID: NORMAL
BPU ID: NORMAL
CROSSMATCH: NORMAL
CROSSMATCH: NORMAL
UNIT DISPENSE STATUS: NORMAL
UNIT DISPENSE STATUS: NORMAL
UNIT PRODUCT CODE: NORMAL
UNIT PRODUCT CODE: NORMAL
UNIT PRODUCT VOLUME: 350 ML
UNIT PRODUCT VOLUME: 350 ML
UNIT RH: NORMAL
UNIT RH: NORMAL

## 2022-03-07 ENCOUNTER — TELEPHONE (OUTPATIENT)
Dept: GENETICS | Facility: CLINIC | Age: 37
End: 2022-03-07

## 2022-03-07 NOTE — TELEPHONE ENCOUNTER
I called Tresa Canavan to schedule a new patient appointment with the [facility]       Outcome:   Spoke with patient, pt was recently dx with a cancerous mass on her kidney which was removed  She also has a fhx of breast cancer (m aunt)   Genetics appt scheduled for 4/21

## 2022-03-08 ENCOUNTER — TELEPHONE (OUTPATIENT)
Dept: UROLOGY | Facility: AMBULATORY SURGERY CENTER | Age: 37
End: 2022-03-08

## 2022-03-08 NOTE — TELEPHONE ENCOUNTER
Pt called today and left a   PT is asking for someone to call her and discuss billing questions she has on her most recent procedure        She can be reached at [phone]

## 2022-03-12 PROBLEM — Z71.2 ENCOUNTER TO DISCUSS TEST RESULTS: Status: ACTIVE | Noted: 2022-03-12

## 2022-03-12 PROBLEM — C64.1 RENAL CELL CARCINOMA OF RIGHT KIDNEY (HCC): Status: ACTIVE | Noted: 2022-03-12

## 2022-03-12 NOTE — PROGRESS NOTES
Problem List Items Addressed This Visit        Genitourinary    Renal cell carcinoma of right kidney ([address] ) - Primary    Relevant Orders    CBC and differential    Comprehensive metabolic panel    CT chest abdomen pelvis w wo contrast       Other    Encounter to discuss test results            Discussion:    Georgie Hoffman is doing well postop, she has had some trouble adjusting to her new diagnosis which she is healing well  She has no hernias  I will see her back in 3 months with imaging prior  Assessment and plan:     Please see problem oriented charting for the assessment plan of today's urological complaints    Alisa House MD      Chief Complaint     As above      History of Present Illness     Kishor Alford is a 43 y o  woman who is status post a right partial nephrectomy, postoperative course has been uncomplicated  This shows a chromophobe renal cell carcinoma with negative margins  She has had troubles with her baseline anxiety  Otherwise no issues  Her incisions are clean, dry, and intact  The following portions of the patient's history were reviewed and updated as appropriate: allergies, current medications, past family history, past medical history, past social history, past surgical history and problem list     Detailed Urologic History     - please refer to HPI    Review of Systems     Review of Systems   Constitutional: Negative  HENT: Negative  Eyes: Negative  Respiratory: Negative  Cardiovascular: Negative  Gastrointestinal: Negative  Endocrine: Negative  Genitourinary: Negative  Musculoskeletal: Negative  Skin: Negative  Allergic/Immunologic: Negative  Neurological: Negative  Hematological: Negative  Psychiatric/Behavioral: Negative  Allergies     Allergies   Allergen Reactions    Neosporin [Neomycin-Bacitracin Zn-Polymyx] Hives    Pollen Extract Allergic Rhinitis       Physical Exam     Physical Exam  Vitals reviewed  Constitutional:       General: She is not in acute distress  Appearance: Normal appearance  She is not ill-appearing, toxic-appearing or diaphoretic  HENT:      Head: Normocephalic and atraumatic  Eyes:      General: No scleral icterus  Right eye: No discharge  Left eye: No discharge  Cardiovascular:      Pulses: Normal pulses  Pulmonary:      Effort: Pulmonary effort is normal    Abdominal:      General: There is no distension  Palpations: There is no mass  Musculoskeletal:         General: No swelling or tenderness  Skin:     Coloration: Skin is not jaundiced  Neurological:      General: No focal deficit present  Mental Status: She is alert  Psychiatric:         Mood and Affect: Mood normal          Behavior: Behavior normal          Thought Content:  Thought content normal          Judgment: Judgment normal              Vital Signs  Vitals:    03/14/22 0949   BP: 124/68   Pulse: 74   Resp: 18   SpO2: 99%   Weight: 69 9 kg (154 lb)   Height: 5' 7" (1 702 m)         Current Medications       Current Outpatient Medications:     acetaminophen (TYLENOL) 325 mg tablet, Take 650 mg by mouth every 4 (four) hours as needed for mild pain, Disp: , Rfl:     dicyclomine (BENTYL) 10 mg capsule, Take 1 capsule (10 mg total) by mouth every 8 (eight) hours as needed (abd spasms/cramping) for up to 5 days, Disp: 15 capsule, Rfl: 0    docusate sodium (COLACE) 100 mg capsule, Take 1 capsule (100 mg total) by mouth 3 (three) times a day for 14 days (Patient not taking: Reported on 2/28/2022 ), Disp: 42 capsule, Rfl: 0    multivitamin (THERAGRAN) TABS, Take 1 tablet by mouth daily (Patient not taking: Reported on 2/28/2022 ), Disp: , Rfl:     norethindrone-ethinyl estradiol (Junel FE 1/20) 1-20 MG-MCG per tablet, Take 1 tablet by mouth daily, Disp: 84 tablet, Rfl: 3    pantoprazole (PROTONIX) 20 mg tablet, Take 1 tablet (20 mg total) by mouth daily before breakfast, Disp: 30 tablet, Rfl: 1    triamcinolone (KENALOG) 0 5 % ointment, Apply topically 2 (two) times a day for 3 days As needed for itching, Disp: 15 g, Rfl: 0      Active Problems     Patient Active Problem List   Diagnosis    S/P removal of left ovary    History of anxiety    Change in bowel habits    Epigastric abdominal tenderness    History of partial nephrectomy    History of  novel coronavirus disease (COVID-19)    Renal cell carcinoma of right kidney ([facility] [address] )    Encounter to discuss test results         Past Medical History     Past Medical History:   Diagnosis Date    Anemia     Female infertility     IVF pregnacny , IVF     Gestational hypertension, third trimester     resolved:  May 12, 2015    Infertility, female     Migraine     Resolved: Nov 3, 2015    Ovarian cyst     removed , 6lb cyst    Preeclampsia     Pregnancy resulting from in vitro fertilization in third trimester 2018    Seasonal allergies     Type AB blood, Rh positive     Varicella     had as child around 8years old     Visual impairment     eywear          Surgical History     Past Surgical History:   Procedure Laterality Date     SECTION, LOW TRANSVERSE  2015 daughter   Collette Joseph Left     unilateral - Removal of one ovary     OVARIAN CYST REMOVAL Left     TN  DELIVERY ONLY N/A 2018    Procedure:  SECTION () REPEAT;  Surgeon: Alana Fountain MD;  Location: [facility] ;  Service: Obstetrics    TN LAP,PARTIAL NEPHRECTOMY Right 2022    Procedure: RIGHT ROBOTIC LAPAROSCOPIC PARTIAL NEPHRECTOMY;  Surgeon: Haroon Howard MD;  [facility];  Service: Urology         Family History     Family History   Problem Relation Age of Onset    Varicose Veins Mother     Cirrhosis Mother     Alcohol abuse Mother     Hyperlipidemia Father     Arthritis Maternal Grandmother     Hypertension Maternal Grandfather  Lung cancer Paternal Grandfather     Breast cancer Maternal Aunt 36    Cancer Maternal Aunt     Substance Abuse Neg Hx     Mental illness Neg Hx     Depression Neg Hx          Social History     Social History     Social History     Tobacco Use   Smoking Status Never Smoker   Smokeless Tobacco Never Used         Pertinent Lab Values     Lab Results   Component Value Date    CREATININE 0 82 02/22/2022         Final Diagnosis   A  Kidney, Right, partial nephrectomy:  - Chromophobe renal cell carcinoma  See comment and synoptic report      Comment: Immunohistochemistry is positive in the tumor cells for CK7 and ; and negative for carbonic anhydrase IX and P504S     8th Ed AJCC Tumor Stage:  at least Stage I - pT1a, pNX, G1    Representative tumor block: A4   Electronically signed by Yuliana Cardoso MD on 2/24/2022 at  3:00 PM           Pertinent Imaging      No new imaging for my review

## 2022-03-12 NOTE — PATIENT INSTRUCTIONS
Renal Cancer   WHAT YOU NEED TO KNOW:   What is renal cancer? Renal cancer begins in the kidney or ureters  The ureters are the tubes that connect your kidneys to your bladder  Urine is made in the kidneys, collects in the bladder, and is emptied from your body through your urethra  What increases my risk for renal cancer? · Age between 39 and 76 years    · A gene that makes certain cancers more likely, or a family history of renal cancer    · Smoking cigarettes    · Obesity    · High blood pressure    · Kidney disease, long-term kidney dialysis, or a kidney transplant    · Eating large amounts of red meat, such as beef or pork    What are the signs and symptoms of renal cancer? · Pink, red, or brown urine    · Abdominal pain or pain in your side    · A lump or growth in your abdomen or side    · Swollen legs or feet, or swelling in your scrotum (males)    · Loss of appetite or weight loss    · Fatigue, fever, or night sweats    How is renal cancer diagnosed? · A urine sample  may be checked for blood, cancer cells, or infection  · X-ray, ultrasound, CT, or MRI  pictures will show a kidney tumor  You may be given contrast liquid to help the tumor show up better in pictures  Tell the healthcare provider if you have ever had an allergic reaction to contrast liquid  Do not enter the MRI room with anything metal  Metal can cause serious injury  Tell the healthcare provider if you have any metal in or on your body  · Ureteroscopy and pyeloscopy  are procedures to look inside your ureters and kidney  Samples of tissue may be removed and tested for cancer  Ask your healthcare provider for more information on these procedures  · A biopsy  may be used to take a sample of tissue from your kidney or ureter  The sample will be sent to a lab and tested for cancer  How is renal cancer treated? · Surgery  is the main treatment for renal cancer   Your entire kidney may be removed, or only the part where the tumor is found  Lymph nodes or other tissues that contain cancer cells may also be removed  · Procedures  may be used to kill the cancer cells  Examples include cryosurgery, radiofrequency ablation, and arterial embolization  Ask your healthcare provider for more information on these procedures  · Targeted therapy  is medicine given to target and kill cancer cells  It may shrink a kidney tumor or slow its growth  · Immunotherapy  is medicine to help your immune system fight the cancer cells  What can I do to care for myself? · Do not smoke  Nicotine can damage blood vessels and make it more difficult to manage renal cancer  Smoking also increases your risk for new or returning cancer  Ask your healthcare provider for information if you currently smoke and need help to quit  E-cigarettes or smokeless tobacco still contain nicotine  Talk to your healthcare provider before you use these products  · Do not drink alcohol  Alcohol can damage your kidneys and make it hard to manage renal cancer  Alcohol can also increase your risk for dehydration  · Drink liquids as directed  Liquids will help prevent constipation and fluid loss caused by vomiting or diarrhea  Ask your healthcare provider how much liquid to drink each day and which liquids are best for you  · Eat a variety of healthy foods  Healthy foods include fruits, vegetables, whole-grain breads, low-fat dairy products, beans, lean meats, and fish  Your healthcare provider may recommend that you limit red meat  You need to eat enough calories to help prevent weight loss and increase your energy level  You also need protein to give you strength  If you do not feel hungry, eat small amounts often instead of large meals  Your healthcare provider or dietitian to help you plan your meals  · Exercise as directed  Exercise can increase your energy level and appetite   Ask your healthcare provider how much exercise you need and which exercises are best for you  Where can I find more information and support? It may be difficult for you and your family to go through cancer and cancer treatments  Join a support group or talk with others who have gone through treatment  · [address]  [place]  Phone: 9- 484 - [phone]  Web Address: http://[employer]/  [employer]  · [address] [facility], [address] [place] [address]  Phone: 4- 639 - [phone]  Web Address: http://[employer]/  [employer]    Call your local emergency number (911 in the [place]), or have someone call if:   · You have trouble breathing  · You feel lightheaded, short of breath, and have chest pain  · You cough up blood  When should I seek immediate care? · Your arm or leg feels warm, tender, and painful  It may look swollen and red  · You cannot urinate  · You have severe fatigue or confusion  When should I call my doctor? · You have a fever  · Your pain does not go away after you take pain medicine  · You have new or worsening pain  · You have swelling in your legs or feet  · You lose more weight than your healthcare provider said is okay  · You have questions or concerns about your condition or care  CARE AGREEMENT:   You have the right to help plan your care  Learn about your health condition and how it may be treated  Discuss treatment options with your healthcare providers to decide what care you want to receive  You always have the right to refuse treatment  The above information is an  only  It is not intended as medical advice for individual conditions or treatments  Talk to your doctor, nurse or pharmacist before following any medical regimen to see if it is safe and effective for you  © Copyright [employer] 2022 Information is for End User's use only and may not be sold, redistributed or otherwise used for commercial purposes   All illustrations and images included in [facility] are the copyrighted property of [employer] D A M [employer] Inc  or [facility]

## 2022-03-14 ENCOUNTER — OFFICE VISIT (OUTPATIENT)
Dept: UROLOGY | Facility: CLINIC | Age: 37
End: 2022-03-14

## 2022-03-14 VITALS
OXYGEN SATURATION: 99 % | BODY MASS INDEX: 24.17 KG/M2 | SYSTOLIC BLOOD PRESSURE: 124 MMHG | HEART RATE: 74 BPM | WEIGHT: 154 LBS | HEIGHT: 67 IN | RESPIRATION RATE: 18 BRPM | DIASTOLIC BLOOD PRESSURE: 68 MMHG

## 2022-03-14 DIAGNOSIS — Z71.2 ENCOUNTER TO DISCUSS TEST RESULTS: ICD-10-CM

## 2022-03-14 DIAGNOSIS — C64.1 RENAL CELL CARCINOMA OF RIGHT KIDNEY (HCC): Primary | ICD-10-CM

## 2022-03-14 PROCEDURE — 99024 POSTOP FOLLOW-UP VISIT: CPT | Performed by: UROLOGY

## 2022-03-30 ENCOUNTER — OFFICE VISIT (OUTPATIENT)
Dept: INTERNAL MEDICINE CLINIC | Facility: CLINIC | Age: 37
End: 2022-03-30
Payer: COMMERCIAL

## 2022-03-30 VITALS
BODY MASS INDEX: 24.58 KG/M2 | SYSTOLIC BLOOD PRESSURE: 122 MMHG | HEART RATE: 100 BPM | RESPIRATION RATE: 16 BRPM | WEIGHT: 156.6 LBS | OXYGEN SATURATION: 99 % | DIASTOLIC BLOOD PRESSURE: 78 MMHG | TEMPERATURE: 98.2 F | HEIGHT: 67 IN

## 2022-03-30 DIAGNOSIS — R21 RASH OF ENTIRE BODY: Primary | ICD-10-CM

## 2022-03-30 DIAGNOSIS — L28.2 PRURITIC RASH: ICD-10-CM

## 2022-03-30 PROCEDURE — 99213 OFFICE O/P EST LOW 20 MIN: CPT | Performed by: INTERNAL MEDICINE

## 2022-03-30 PROCEDURE — 3008F BODY MASS INDEX DOCD: CPT | Performed by: INTERNAL MEDICINE

## 2022-03-30 PROCEDURE — 1036F TOBACCO NON-USER: CPT | Performed by: INTERNAL MEDICINE

## 2022-03-30 RX ORDER — PREDNISONE 10 MG/1
TABLET ORAL
Qty: 19 TABLET | Refills: 0 | Status: CANCELLED | OUTPATIENT
Start: 2022-03-30

## 2022-03-30 NOTE — PROGRESS NOTES
Assessment/Plan:     Diagnoses and all orders for this visit:    Rash of entire body  Comments:  etiology of rash unclear, concern for Allergic dermatitis, diffuse candida rash(?), contact dermatitis  advised on emollients but avoid use on surgical sites    Pruritic rash  Comments:  as above and hydrocortisone for arms/legs/back but not for use near surgical sites  derm eval/appt booked for tmrw by me at 1:40pm with Dr Poppy Cook office          Subjective:      Patient ID: Tasha Saunders is a 43 y o  female  HPI    Here for same day appt and c/o pruritic rash over entire body from neck down for 1 week  No fever, chills and [place] doesn't feel sick otherwise  Rash is itchy and red but not painful  No new soaps, detergents but she is using new body wash over recent weeks  No one else is sick at home or has a rash(lives with  and children)  She had renal cell cancer treated last month with surgery and her 1 surgical site is still draining/weeping  Her urology office ordered bactroban ointment today for her to use  The rash is most prominent around her laparoscopic surgical sites  She is taking oral benadryl at bedtime with some temporary relief  ROS otherwise negative, no other complaints  Past Medical History:   Diagnosis Date    Anemia     Female infertility     IVF pregnacny 2017, IVF 2014    Gestational hypertension, third trimester     resolved:  May 12, 2015    Infertility, female     Migraine     Resolved: Nov 3, 2015    Ovarian cyst     removed 2005, 6lb cyst    Preeclampsia     Pregnancy resulting from in vitro fertilization in third trimester 4/6/2018    Seasonal allergies     Type AB blood, Rh positive     Varicella     had as child around 8years old     Visual impairment     eywear      Vitals:    03/30/22 1041   BP: 122/78   Pulse: 100   Resp: 16   Temp: 98 2 °F (36 8 °C)   SpO2: 99%   Weight: 71 kg (156 lb 9 6 oz)   Height: 5' 7" (1 702 m)     Body mass index is 24 53 kg/m²  Current Outpatient Medications:     acetaminophen (TYLENOL) 325 mg tablet, Take 650 mg by mouth every 4 (four) hours as needed for mild pain, Disp: , Rfl:     mupirocin (BACTROBAN) 2 % ointment, Apply topically 3 (three) times a day, Disp: 22 g, Rfl: 0    norethindrone-ethinyl estradiol (Junel FE 1/20) 1-20 MG-MCG per tablet, Take 1 tablet by mouth daily, Disp: 84 tablet, Rfl: 3    pantoprazole (PROTONIX) 20 mg tablet, Take 1 tablet (20 mg total) by mouth daily before breakfast, Disp: 30 tablet, Rfl: 1    dicyclomine (BENTYL) 10 mg capsule, Take 1 capsule (10 mg total) by mouth every 8 (eight) hours as needed (abd spasms/cramping) for up to 5 days, Disp: 15 capsule, Rfl: 0    docusate sodium (COLACE) 100 mg capsule, Take 1 capsule (100 mg total) by mouth 3 (three) times a day for 14 days (Patient not taking: Reported on 2/28/2022 ), Disp: 42 capsule, Rfl: 0    multivitamin (THERAGRAN) TABS, Take 1 tablet by mouth daily (Patient not taking: Reported on 2/28/2022 ), Disp: , Rfl:     triamcinolone (KENALOG) 0 5 % ointment, Apply topically 2 (two) times a day for 3 days As needed for itching, Disp: 15 g, Rfl: 0  Allergies   Allergen Reactions    Neosporin [Neomycin-Bacitracin Zn-Polymyx] Hives    Pollen Extract Allergic Rhinitis         Review of Systems   Constitutional: Negative for chills and fever  HENT: Negative for congestion  Respiratory: Negative for shortness of breath  Cardiovascular: Negative for chest pain  Gastrointestinal: Negative for abdominal pain  Genitourinary: Negative for difficulty urinating  Musculoskeletal: Negative for gait problem  Skin: Positive for rash  Allergic/Immunologic: Negative for immunocompromised state  Neurological: Negative for dizziness  Psychiatric/Behavioral: Negative for dysphoric mood           Objective:      /78   Pulse 100   Temp 98 2 °F (36 8 °C)   Resp 16   Ht 5' 7" (1 702 m)   Wt 71 kg (156 lb 9 6 oz)   SpO2 99% BMI 24 53 kg/m²          Physical Exam  Vitals and nursing note reviewed  Constitutional:       Appearance: Normal appearance  Comments: Uncomfortable due to itching   HENT:      Head: Normocephalic and atraumatic  Eyes:      Conjunctiva/sclera: Conjunctivae normal    Cardiovascular:      Rate and Rhythm: Normal rate and regular rhythm  Heart sounds: No murmur heard  Pulmonary:      Effort: Pulmonary effort is normal       Breath sounds: No wheezing or rales  Abdominal:      General: Bowel sounds are normal       Palpations: Abdomen is soft  Tenderness: There is no abdominal tenderness  Musculoskeletal:      Right lower leg: No edema  Left lower leg: No edema  Skin:     Findings: Erythema and rash present  Rash is macular and papular  Rash is not urticarial or vesicular  Comments: Diffuse excoriations with dermatographism on arms/legs and back  Surgical scar/sites have erythema surrounding all of them in a satellite distribution  1 surgical site is occ weeping clear, yellow fluid on lower abd wall  Neurological:      Mental Status: She is alert     Psychiatric:         Mood and Affect: Mood normal          Behavior: Behavior normal

## 2022-03-30 NOTE — PATIENT INSTRUCTIONS
1  Emollients such as aquaphor or eucerin  2  Avoid any new products, vanicream  3  Hydrocortisone to arms/legs/back 1-2 times per day but not on surgical scar sites  4   Dermatology appointment with Akanksha Le at [facility] [address] tomorrow at 1:40pm

## 2022-04-06 ENCOUNTER — OFFICE VISIT (OUTPATIENT)
Dept: GASTROENTEROLOGY | Facility: AMBULARY SURGERY CENTER | Age: 37
End: 2022-04-06
Payer: COMMERCIAL

## 2022-04-06 VITALS
HEIGHT: 67 IN | BODY MASS INDEX: 24.55 KG/M2 | SYSTOLIC BLOOD PRESSURE: 128 MMHG | RESPIRATION RATE: 18 BRPM | DIASTOLIC BLOOD PRESSURE: 74 MMHG | OXYGEN SATURATION: 100 % | HEART RATE: 80 BPM | WEIGHT: 156.4 LBS

## 2022-04-06 DIAGNOSIS — R19.8 IRREGULAR BOWEL HABITS: Primary | ICD-10-CM

## 2022-04-06 PROCEDURE — 3008F BODY MASS INDEX DOCD: CPT | Performed by: PHYSICIAN ASSISTANT

## 2022-04-06 PROCEDURE — 1036F TOBACCO NON-USER: CPT | Performed by: PHYSICIAN ASSISTANT

## 2022-04-06 PROCEDURE — 99213 OFFICE O/P EST LOW 20 MIN: CPT | Performed by: PHYSICIAN ASSISTANT

## 2022-04-06 NOTE — ASSESSMENT & PLAN NOTE
Appears to be most likely functional, patient had EGD and colonoscopy recently showing no evidence of inflammatory bowel disease, microscopic colitis, celiac disease, gastritis/peptic ulcer disease or H pylori infection    She does appear to be clinically improved at this time     -advised patient to let us know if she has recurrences of her symptoms, we can pursue further workup if needed     -if she does experience mild stool irregularity, I would recommend instituting a daily fiber supplement to help form and bulk the stools     -may continue with Protonix 20 milligrams daily for now, she does appear to be having occasional breakthrough GERD symptomatology but only rarely requiring supplemental Pepcid at this point     -advised patient regarding dietary lifestyle modification strategies for the mitigation of GERD    - barring development of new symptoms or alarm symptoms, she will be due for colonoscopy for screening purposes at the age of 37

## 2022-04-06 NOTE — PROGRESS NOTES
Follow-up Note -  [facility]  Jenny Kat 1985 43 y o  female         Reason:  Follow-up; irregular bowel habits, dyspepsia    HPI:  68-year-old female with history of large ovarian cyst removed surgically who presents for follow-up, she was seen in our office with Dr Delvin Waldrop in November for evaluation of intermittent frequent bowel movements, bloating, postprandial epigastric pain with recent ultrasound showing no cholelithiasis  Workup for celiac disease was negative, she underwent EGD and colonoscopy in February only showing a few sigmoid diverticuli and mild gastritis with biopsies negative for evidence of celiac disease or H pylori  In the interim since then, she actually was diagnosed with kidney cancer and underwent a partial nephrectomy, not requiring chemotherapy or radiation  She is currently on antibiotics for Staph infection which occurred postoperatively  At this time, however, she says she is actually feeling better with respect to her GI symptoms, she says that having the colonoscopy done seems like it clear her out, and her bowel habits are actually currently regular, occurring about once to twice a day with normal formed to the stools, denies any rectal bleeding or melena, she reports her appetite has been good and denies any unexplained weight loss  She says she sometimes notes some acid in the throat in the morning prior to taking her PPI, she currently takes Protonix 20 mg daily, she will take Pepcid in the evening as needed for this throat burning in the morning issue and says this only occurs rarely  She denies any dysphagia or vomiting  REVIEW OF SYSTEMS:      CONSTITUTIONAL: Denies any fever, chills, or rigors  Good appetite, and no recent weight loss  HEENT: No earache or tinnitus  Denies hearing loss or visual disturbances  CARDIOVASCULAR: No chest pain or palpitations     RESPIRATORY: Denies any cough, hemoptysis, shortness of breath or dyspnea on exertion  GASTROINTESTINAL: As noted in the History of Present Illness  GENITOURINARY: No problems with urination  Denies any hematuria or dysuria  NEUROLOGIC: No dizziness or vertigo, denies headaches  MUSCULOSKELETAL: Denies any muscle or joint pain  SKIN: Denies skin rashes or itching  ENDOCRINE: Denies excessive thirst  Denies intolerance to heat or cold  PSYCHOSOCIAL: Denies depression or anxiety  Denies any recent memory loss  Past Medical History:   Diagnosis Date    Anemia     Female infertility     IVF pregnacny , IVF     Gestational hypertension, third trimester     resolved:  May 12, 2015    Infertility, female     Migraine     Resolved: Nov 3, 2015    Ovarian cyst     removed , 6lb cyst    Preeclampsia     Pregnancy resulting from in vitro fertilization in third trimester 2018    Seasonal allergies     Type AB blood, Rh positive     Varicella     had as child around 8years old     Visual impairment     eywear       Past Surgical History:   Procedure Laterality Date     SECTION, LOW TRANSVERSE  2015 daughter   S[place]ou Ovalle Left     unilateral - Removal of one ovary     OVARIAN CYST REMOVAL Left     AK  DELIVERY ONLY N/A 2018    Procedure:  SECTION () REPEAT;  Surgeon: William Fowler MD;  Location: [facility] ;  Service: Obstetrics    AK LAP,PARTIAL NEPHRECTOMY Right 2022    Procedure: RIGHT ROBOTIC LAPAROSCOPIC PARTIAL NEPHRECTOMY;  Surgeon: Jayson Harrell MD;  [facility];  Service: Urology     Social History     Socioeconomic History    Marital status: /Civil Union     Spouse name: Not on file    Number of children: 1    Years of education: Not on file    Highest education level: Not on file   Occupational History    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 [place]    Tobacco Use    Smoking status: Never Smoker    Smokeless tobacco: Never Used   Vaping Use    Vaping Use: Never used   Substance and Sexual Activity    Alcohol use: Yes     Comment: socially    Drug use: No    Sexual activity: Yes     Partners: Male     Birth control/protection: OCP   Other Topics Concern    Not on file   Social History Narrative    Always wears seat belt     Daily caffeinated coffee consumption     Tea      Social Determinants of Health     Financial Resource Strain: Not on file   Food Insecurity: Not on file   Transportation Needs: Not on file   Physical Activity: Not on file   Stress: Not on file   Social Connections: Not on file   Intimate Partner Violence: Not on file   Housing Stability: Not on file     Family History   Problem Relation Age of Onset    Varicose Veins Mother     Cirrhosis Mother     Alcohol abuse Mother     Hyperlipidemia Father     Arthritis Maternal Grandmother     Hypertension Maternal Grandfather     Lung cancer Paternal Grandfather     Breast cancer Maternal Aunt 36    Cancer Maternal Aunt     Substance Abuse Neg Hx     Mental illness Neg Hx     Depression Neg Hx      Neosporin [neomycin-bacitracin zn-polymyx] and Pollen extract  Current Outpatient Medications   Medication Sig Dispense Refill    acetaminophen (TYLENOL) 325 mg tablet Take 650 mg by mouth every 4 (four) hours as needed for mild pain      mupirocin (BACTROBAN) 2 % ointment Apply topically 3 (three) times a day 22 g 0    norethindrone-ethinyl estradiol (Junel FE 1/20) 1-20 MG-MCG per tablet Take 1 tablet by mouth daily 84 tablet 3    pantoprazole (PROTONIX) 20 mg tablet Take 1 tablet (20 mg total) by mouth daily before breakfast 30 tablet 1    dicyclomine (BENTYL) 10 mg capsule Take 1 capsule (10 mg total) by mouth every 8 (eight) hours as needed (abd spasms/cramping) for up to 5 days 15 capsule 0    docusate sodium (COLACE) 100 mg capsule Take 1 capsule (100 mg total) by mouth 3 (three) times a day for 14 days (Patient not taking: Reported on 2/28/2022 ) 42 capsule 0    multivitamin (THERAGRAN) TABS Take 1 tablet by mouth daily (Patient not taking: Reported on 2/28/2022 )      triamcinolone (KENALOG) 0 5 % ointment Apply topically 2 (two) times a day for 3 days As needed for itching 15 g 0     No current facility-administered medications for this visit  Blood pressure 128/74, pulse 80, resp  rate 18, height 5' 7" (1 702 m), weight 70 9 kg (156 lb 6 4 oz), SpO2 100 %, not currently breastfeeding  PHYSICAL EXAM:      General Appearance:   Alert, cooperative, no distress, appears stated age    HEENT:   Normocephalic, atraumatic, anicteric      Neck:  Supple, symmetrical, trachea midline, no adenopathy;    thyroid: no enlargement/tenderness/nodules; no carotid  bruit or JVD    Lungs:   Clear to auscultation bilaterally; no rales, rhonchi or wheezing; respirations unlabored    Heart[de-identified]   S1 and S2 normal; regular rate and rhythm; no murmur, rub, or gallop  Abdomen:   Soft, non-tender, non-distended; normal bowel sounds; no masses, no organomegaly    Extremities: No edema, erythema, wounds, rashes   Rectal:   Deferred                      Lab Results   Component Value Date    WBC 9 47 02/22/2022    HGB 10 3 (L) 02/22/2022    HCT 31 9 (L) 02/22/2022    MCV 90 02/22/2022     02/22/2022     Lab Results   Component Value Date    GLUCOSE 87 04/27/2015    CALCIUM 8 5 02/22/2022     08/27/2016    K 3 5 02/22/2022    CO2 28 02/22/2022     02/22/2022    BUN 7 02/22/2022    CREATININE 0 82 02/22/2022     Lab Results   Component Value Date    ALT 35 11/10/2021    AST 30 11/10/2021    ALKPHOS 46 11/10/2021    BILITOT 0 11 (L) 04/27/2015     Lab Results   Component Value Date    INR 0 92 01/31/2022    PROTIME 12 3 01/31/2022       EGD    Result Date: 2/9/2022  Impression: 1  Mild gastritis  2  Diminutive polyp, likely fundic gland  RECOMMENDATION: Await pathology results Follow-up biopsy results in 2 weeks  Avoid NSAIDs   Advised to avoid fatty foods, chocolates, caffeine, alcohol and any other triggering foods  Avoid eating for at least 3 hours before going to bed  Can continue pantoprazole 20 mg on daily basis  Can consider adding Pepcid at bedtime  Annamarie Parish MD     Colonoscopy    Result Date: 2/9/2022  Impression: 1  Few diminutive diverticuli noted in the sigmoid colon  2  Small internal hemorrhoids  3  Normal appearing colonic mucosa, random biopsies obtained to assess for microscopic colitis  TI was briefly intubated and appeared normal within the distal 5 cm  RECOMMENDATION: Repeat screening colonoscopy in 10 years   Follow-up biopsy results in 2 weeks  High-fiber diet with 25-30 g of fiber on daily basis  Annamarie Parish MD       ASSESSMENT & PLAN:    Irregular bowel habits  Appears to be most likely functional, patient had EGD and colonoscopy recently showing no evidence of inflammatory bowel disease, microscopic colitis, celiac disease, gastritis/peptic ulcer disease or H pylori infection    She does appear to be clinically improved at this time     -advised patient to let us know if she has recurrences of her symptoms, we can pursue further workup if needed     -if she does experience mild stool irregularity, I would recommend instituting a daily fiber supplement to help form and bulk the stools     -may continue with Protonix 20 milligrams daily for now, she does appear to be having occasional breakthrough GERD symptomatology but only rarely requiring supplemental Pepcid at this point     -advised patient regarding dietary lifestyle modification strategies for the mitigation of GERD    - barring development of new symptoms or alarm symptoms, she will be due for colonoscopy for screening purposes at the age of 37

## 2022-04-15 ENCOUNTER — TELEPHONE (OUTPATIENT)
Dept: SURGICAL ONCOLOGY | Facility: CLINIC | Age: 37
End: 2022-04-15

## 2022-04-21 ENCOUNTER — CLINICAL SUPPORT (OUTPATIENT)
Dept: GENETICS | Facility: CLINIC | Age: 37
End: 2022-04-21

## 2022-04-21 DIAGNOSIS — C64.9 CHROMOPHOBE RENAL CELL CARCINOMA (HCC): ICD-10-CM

## 2022-04-21 DIAGNOSIS — Z80.3 FAMILY HISTORY OF BREAST CANCER: ICD-10-CM

## 2022-04-21 DIAGNOSIS — Z80.42 FAMILY HISTORY OF PROSTATE CANCER: Primary | ICD-10-CM

## 2022-04-21 PROCEDURE — NC001 PR NO CHARGE: Performed by: GENETIC COUNSELOR, MS

## 2022-04-21 NOTE — LETTER
2022     Mary Monteiro DO  [address]    Patient: Irvin Mattson  YOB: 1985  Date of Visit: 2022      Dear Dr Stella Duran:    Thank you for referring Mary Carmen Archibald to me for evaluation  Below are my notes for this consultation  If you have questions, please do not hesitate to call me  I look forward to following your patient along with you  Sincerely,        Sadia Orr, GC        CC: No Recipients        Pre-Test Genetic Counseling Consult Note    Patient Name: Irvin Mattson   /Age: 1985/36 y o  Referring Provider: Mary Monteiro DO     Date of Service: 2022  Genetic Counselor: Erendira Medina MS, [facility]  Interpretation Services: None  [facility]      Orrie Siemens was referred to the [address] [place] [address] and Genetic Assessment Program due to her personal history of kidney cancer and family history of breast and prostate cancer  She presents today to discuss the possibility of a hereditary cancer syndrome, options for genetic testing, and implications for her and her family  Cancer History and Treatment:     Personal History: Personal history of kidney cancer at age 43    20  Final Diagnosis   A  Kidney, Right, partial nephrectomy:  - Chromophobe renal cell carcinoma  See comment and synoptic report      Comment: Immunohistochemistry is positive in the tumor cells for CK7 and ; and negative for carbonic anhydrase IX and P504S     8th Ed AJCC Tumor Stage:  at least Stage I - pT1a, pNX, G1  Representative tumor block: A4   Electronically signed by Johnny Dumont MD on 2022 at  3:00 PM     Screening Hx:     Breast:  Mammogram: Annual   Breast biopsy: None     Colon:  Colonoscopy & EGD 22  Final Diagnosis   A  Duodenum:  - Small bowel mucosa with no significant histopathologic abnormality   - Negative for malabsorption pattern    - Negative for malignancy       B  Stomach:  - Gastric mucosa with no significant histopathologic abnormality   - Negative for H  pylori by routine H&E   - Negative for malignancy       C  Gastric Polypectomy:  - Fundic gland polyp   - Negative for dysplasia and malignancy       D  Colon, Random:  - Colonic mucosa with no significant histopathologic abnormality   - No evidence of microscopic colitis or acute inflammation   - Negative for dysplasia and malignancy  Electronically signed by Tarun Adams MD on 2022 at 11:35 AM     Gynecologic:  Pelvic/Pap exam: Routinely   Ovaries/Uterus: Left ovary removed in  due to ovarian cyst; right ovary and BROCK intact     Skin:  Skin cancer screening: Annual with a dermatologist     Reproductive History  Age at menarche: 10y  Age at first live birth: 32y  Menopause: Pre-menopausal   Hormone replacement: None     Medical and Surgical History  Pertinent surgical history:   Past Surgical History:   Procedure Laterality Date     SECTION, LOW TRANSVERSE  2015 daughter   Nancy Garcia Left     unilateral - Removal of one ovary     OVARIAN CYST REMOVAL Left     [place]  DELIVERY ONLY N/A 2018    Procedure:  SECTION () REPEAT;  Surgeon: Jamie Orozco MD;  [facility];  Service: Obstetrics    [place] LAP,PARTIAL NEPHRECTOMY Right 2022    Procedure: RIGHT ROBOTIC LAPAROSCOPIC PARTIAL NEPHRECTOMY;  Surgeon: Nathan Long MD;  [facility];  Service: Urology      Pertinent medical history:  Past Medical History:   Diagnosis Date    Anemia     Female infertility     IVF pregnacny , IVF     Gestational hypertension, third trimester     resolved:  May 12, 2015    Infertility, female     Migraine     Resolved: Nov 3, 2015    Ovarian cyst     removed 2005, 6lb cyst    Preeclampsia     Pregnancy resulting from in vitro fertilization in third trimester 4/6/2018    Seasonal allergies     Type AB blood, Rh positive     Varicella     had as child around 8years old     Visual impairment     eywear        Other History:  Height:   Ht Readings from Last 1 Encounters:   04/06/22 5' 7" (1 702 m)     Weight:   Wt Readings from Last 1 Encounters:   04/06/22 70 9 kg (156 lb 6 4 oz)     Relevant Family History         Please refer to the scanned pedigree in the Media Tab for a complete family history     *All history is reported as provided by the patient; records are not available for review, except where indicated  Assessment:  We discussed sporadic, familial and hereditary cancer  We also discussed the many factors that influence our risk for cancer such as age, environmental exposures, lifestyle choices and family history  We reviewed the indications suggestive of a hereditary predisposition to cancer especially the characteristics of Imjv-Xcpu-Jrcé syndrome  Genetic testing is indicated for Evonne Starkey based on the following criteria:     Meets NCCN O8 4018 Testing Criteria for High-Penetrance Breast Cancer Susceptibility Genes: Family history of a maternal aunt (identical twin sister of mother) with breast cancer at age 36    Meets NCCN H2 9166 Testing Criteria for further genetic risk evaluation for Hereditary RCC syndromes: Personal history of renal cancer under the age of 55    The risks, benefits, and limitations of genetic testing were reviewed with the patient, as well as genetic discrimination laws, and possible test results (positive, negative, variants of uncertain significance) and their clinical implications  If positive for a mutation, options for managing cancer risk including increased surveillance, chemoprevention, and in some cases prophylactic surgery were discussed   Evonne Starkey was informed that if a hereditary cancer syndrome was identified in her, first degree relatives (parents, siblings, and children) have a chance of also inheriting the condition  Genetic testing would allow for predictive genetic testing in other relatives, who may also be at risk depending on their degree of relation  Plan: Patient decided not to proceed with testing at this time  Evan Tinajero wants to take some time to consider genetic testing and talk with her family and/or friends prior to proceeding  We gave her a hereditary cancer brochure and information on the federal law JULIANNA Tinajero can reach out to our office at [phone] if/when she decides to proceed with testing

## 2022-04-21 NOTE — PROGRESS NOTES
Pre-Test Genetic Counseling Consult Note    Patient Name: Pio Cardoso   /Age: 1985/36 y o  Referring Provider: Duke Donahue DO     Date of Service: 2022  Genetic Counselor: Raheem Cardoso MS, [facility]  Interpretation Services: None  [facility]      Paola Lyon was referred to the [address] [place] [address] and Genetic Assessment Program due to her personal history of kidney cancer and family history of breast and prostate cancer  She presents today to discuss the possibility of a hereditary cancer syndrome, options for genetic testing, and implications for her and her family  Cancer History and Treatment:     Personal History: Personal history of kidney cancer at age 43    20  Final Diagnosis   A  Kidney, Right, partial nephrectomy:  - Chromophobe renal cell carcinoma  See comment and synoptic report      Comment: Immunohistochemistry is positive in the tumor cells for CK7 and ; and negative for carbonic anhydrase IX and P504S     8th Ed AJCC Tumor Stage:  at least Stage I - pT1a, pNX, G1  Representative tumor block: A4   Electronically signed by Morena Choe MD on 2022 at  3:00 PM     Screening Hx:     Breast:  Mammogram: Annual   Breast biopsy: None     Colon:  Colonoscopy & EGD 22  Final Diagnosis   A  Duodenum:  - Small bowel mucosa with no significant histopathologic abnormality   - Negative for malabsorption pattern  - Negative for malignancy       B  Stomach:  - Gastric mucosa with no significant histopathologic abnormality   - Negative for H  pylori by routine H&E   - Negative for malignancy       C  Gastric Polypectomy:  - Fundic gland polyp   - Negative for dysplasia and malignancy       D  Colon, Random:  - Colonic mucosa with no significant histopathologic abnormality   - No evidence of microscopic colitis or acute inflammation   - Negative for dysplasia and malignancy     Electronically signed by Wallace Choudhary MD on 2022 at 11:35 AM     Gynecologic:  Pelvic/Pap exam: Routinely   Ovaries/Uterus: Left ovary removed in  due to ovarian cyst; right ovary and BROCK intact     Skin:  Skin cancer screening: Annual with a dermatologist     Reproductive History  Age at menarche: 10y  Age at first live birth: 32y  Menopause: Pre-menopausal   Hormone replacement: None     Medical and Surgical History  Pertinent surgical history:   Past Surgical History:   Procedure Laterality Date     SECTION, LOW TRANSVERSE  2015 daughter   Felipe Miguel Left     unilateral - Removal of one ovary     OVARIAN CYST REMOVAL Left     [place]  DELIVERY ONLY N/A 2018    Procedure:  SECTION () REPEAT;  Surgeon: Ira Guajardo MD;  [facility] ;  Service: Obstetrics    [place] LAP,PARTIAL NEPHRECTOMY Right 2022    Procedure: RIGHT ROBOTIC LAPAROSCOPIC PARTIAL NEPHRECTOMY;  Surgeon: Uriel Ferreira MD;  [facility];  Service: Urology      Pertinent medical history:  Past Medical History:   Diagnosis Date    Anemia     Female infertility     IVF pregnacny , IVF     Gestational hypertension, third trimester     resolved:  May 12, 2015    Infertility, female     Migraine     Resolved: Nov 3, 2015    Ovarian cyst     removed , 6lb cyst    Preeclampsia     Pregnancy resulting from in vitro fertilization in third trimester 2018    Seasonal allergies     Type AB blood, Rh positive     Varicella     had as child around 8years old     Visual impairment     eywear        Other History:  Height:   Ht Readings from Last 1 Encounters:   22 5' 7" (1 702 m)     Weight:   Wt Readings from Last 1 Encounters:   22 70 9 kg (156 lb 6 4 oz)     Relevant Family History         Please refer to the scanned pedigree in the Media Tab for a complete family history     *All history is reported as provided by the patient; records are not available for review, except where indicated  Assessment:  We discussed sporadic, familial and hereditary cancer  We also discussed the many factors that influence our risk for cancer such as age, environmental exposures, lifestyle choices and family history  We reviewed the indications suggestive of a hereditary predisposition to cancer especially the characteristics of Fktf-Whpj-Kiaé syndrome  Genetic testing is indicated for Yajaira Chacon based on the following criteria:     Meets NCCN V1 9297 Testing Criteria for High-Penetrance Breast Cancer Susceptibility Genes: Family history of a maternal aunt (identical twin sister of mother) with breast cancer at age 36    Meets NCCN C5 0271 Testing Criteria for further genetic risk evaluation for Hereditary RCC syndromes: Personal history of renal cancer under the age of 55    The risks, benefits, and limitations of genetic testing were reviewed with the patient, as well as genetic discrimination laws, and possible test results (positive, negative, variants of uncertain significance) and their clinical implications  If positive for a mutation, options for managing cancer risk including increased surveillance, chemoprevention, and in some cases prophylactic surgery were discussed  Yajaira Chacon was informed that if a hereditary cancer syndrome was identified in her, first degree relatives (parents, siblings, and children) have a chance of also inheriting the condition  Genetic testing would allow for predictive genetic testing in other relatives, who may also be at risk depending on their degree of relation  Plan: Patient decided not to proceed with testing at this time  Yajaira Chacon wants to take some time to consider genetic testing and talk with her family and/or friends prior to proceeding  We gave her a hereditary cancer brochure and information on the federal law JULIANNA Chacon can reach out to our office at [phone] if/when she decides to proceed with testing

## 2022-04-26 NOTE — PLAN OF CARE
BIRTH - VAGINAL/ SECTION     Fetal and maternal status remain reassuring during the birth process Completed     Emotionally satisfying birthing experience for mother/fetus Completed Nostril Rim Text: The closure involved the nostril rim.

## 2022-05-11 DIAGNOSIS — R10.816 EPIGASTRIC ABDOMINAL TENDERNESS, REBOUND TENDERNESS PRESENCE NOT SPECIFIED: ICD-10-CM

## 2022-05-11 RX ORDER — PANTOPRAZOLE SODIUM 20 MG/1
20 TABLET, DELAYED RELEASE ORAL
Qty: 90 TABLET | Refills: 1 | Status: SHIPPED | OUTPATIENT
Start: 2022-05-11

## 2022-06-01 ENCOUNTER — APPOINTMENT (OUTPATIENT)
Dept: LAB | Facility: CLINIC | Age: 37
End: 2022-06-01
Payer: COMMERCIAL

## 2022-06-01 DIAGNOSIS — C64.1 RENAL CELL CARCINOMA OF RIGHT KIDNEY (HCC): ICD-10-CM

## 2022-06-01 LAB
ALBUMIN SERPL BCP-MCNC: 4 G/DL (ref 3.5–5)
ALP SERPL-CCNC: 49 U/L (ref 34–104)
ALT SERPL W P-5'-P-CCNC: 26 U/L (ref 7–52)
ANION GAP SERPL CALCULATED.3IONS-SCNC: 8 MMOL/L (ref 4–13)
AST SERPL W P-5'-P-CCNC: 20 U/L (ref 13–39)
BASOPHILS # BLD AUTO: 0.03 THOUSANDS/ΜL (ref 0–0.1)
BASOPHILS NFR BLD AUTO: 1 % (ref 0–1)
BILIRUB SERPL-MCNC: 0.47 MG/DL (ref 0.2–1)
BUN SERPL-MCNC: 19 MG/DL (ref 5–25)
CALCIUM SERPL-MCNC: 8.9 MG/DL (ref 8.4–10.2)
CHLORIDE SERPL-SCNC: 104 MMOL/L (ref 96–108)
CO2 SERPL-SCNC: 26 MMOL/L (ref 21–32)
CREAT SERPL-MCNC: 0.82 MG/DL (ref 0.6–1.3)
EOSINOPHIL # BLD AUTO: 0.09 THOUSAND/ΜL (ref 0–0.61)
EOSINOPHIL NFR BLD AUTO: 2 % (ref 0–6)
ERYTHROCYTE [DISTWIDTH] IN BLOOD BY AUTOMATED COUNT: 14.5 % (ref 11.6–15.1)
GFR SERPL CREATININE-BSD FRML MDRD: 91 ML/MIN/1.73SQ M
GLUCOSE P FAST SERPL-MCNC: 91 MG/DL (ref 65–99)
HCT VFR BLD AUTO: 37.4 % (ref 34.8–46.1)
HGB BLD-MCNC: 12.4 G/DL (ref 11.5–15.4)
IMM GRANULOCYTES # BLD AUTO: 0.01 THOUSAND/UL (ref 0–0.2)
IMM GRANULOCYTES NFR BLD AUTO: 0 % (ref 0–2)
LYMPHOCYTES # BLD AUTO: 2.35 THOUSANDS/ΜL (ref 0.6–4.47)
LYMPHOCYTES NFR BLD AUTO: 39 % (ref 14–44)
MCH RBC QN AUTO: 29.2 PG (ref 26.8–34.3)
MCHC RBC AUTO-ENTMCNC: 33.2 G/DL (ref 31.4–37.4)
MCV RBC AUTO: 88 FL (ref 82–98)
MONOCYTES # BLD AUTO: 0.29 THOUSAND/ΜL (ref 0.17–1.22)
MONOCYTES NFR BLD AUTO: 5 % (ref 4–12)
NEUTROPHILS # BLD AUTO: 3.25 THOUSANDS/ΜL (ref 1.85–7.62)
NEUTS SEG NFR BLD AUTO: 53 % (ref 43–75)
NRBC BLD AUTO-RTO: 0 /100 WBCS
PLATELET # BLD AUTO: 191 THOUSANDS/UL (ref 149–390)
PMV BLD AUTO: 11.4 FL (ref 8.9–12.7)
POTASSIUM SERPL-SCNC: 3.8 MMOL/L (ref 3.5–5.3)
PROT SERPL-MCNC: 6.7 G/DL (ref 6.4–8.4)
RBC # BLD AUTO: 4.24 MILLION/UL (ref 3.81–5.12)
SODIUM SERPL-SCNC: 138 MMOL/L (ref 135–147)
WBC # BLD AUTO: 6.02 THOUSAND/UL (ref 4.31–10.16)

## 2022-06-01 PROCEDURE — 36415 COLL VENOUS BLD VENIPUNCTURE: CPT

## 2022-06-01 PROCEDURE — 85025 COMPLETE CBC W/AUTO DIFF WBC: CPT

## 2022-06-01 PROCEDURE — 80053 COMPREHEN METABOLIC PANEL: CPT

## 2022-06-01 NOTE — TELEPHONE ENCOUNTER
Patient left voicemail  Patient has a billing question about her procedure with Dr Cruz Casas on 2/21/22  Patient stated insurance is questioning it      Patient requesting a call back at [phone]

## 2022-06-06 ENCOUNTER — TELEPHONE (OUTPATIENT)
Dept: UROLOGY | Facility: CLINIC | Age: 37
End: 2022-06-06

## 2022-06-06 NOTE — TELEPHONE ENCOUNTER
Due to MD availability, patient's follow up appt changed to 6/22/22 at 3pm, still with Dr Sofi Waggoner  Please confirm

## 2022-06-08 ENCOUNTER — TELEPHONE (OUTPATIENT)
Dept: UROLOGY | Facility: AMBULATORY SURGERY CENTER | Age: 37
End: 2022-06-08

## 2022-06-08 DIAGNOSIS — C64.1 RENAL CELL CARCINOMA OF RIGHT KIDNEY (HCC): Primary | ICD-10-CM

## 2022-06-08 NOTE — TELEPHONE ENCOUNTER
Discussed with Dr Petra George, plan for US now, keep appt as scheduled and will then discuss surveillance imaging thereafter  STAT US order placed so it can be done prior to upcoming appt  Called and spoke with patient  Advised of Dr Randall Vera recommendations for US now and to keep appt as scheduled  She was agreeable  Advised she should not have trouble scheduling US for this week, but if she does she is to call office back  CT left as scheduled at this time, new imaging plan will be discussed at appt on 6/22/22  Patient verbalized understanding

## 2022-06-08 NOTE — TELEPHONE ENCOUNTER
PT under care of: [place]      Pt last seen: 03/14/22     PT calling today because/symptoms are:  Pt had to reschedule her CT scan for after August 8  There are no appointments for her in august or September  Please advice the patient on when the f/u can be scheduled     Also she had the blood work done for the blood work for the CT was scheduled for tomorrow  Does she have have to get it done again for the august appt        PT can be reached at: [phone]

## 2022-06-12 ENCOUNTER — HOSPITAL ENCOUNTER (OUTPATIENT)
Dept: ULTRASOUND IMAGING | Facility: HOSPITAL | Age: 37
Discharge: HOME | End: 2022-06-12
Attending: UROLOGY
Payer: COMMERCIAL

## 2022-06-12 DIAGNOSIS — C64.1 RENAL CELL CARCINOMA OF RIGHT KIDNEY (HCC): ICD-10-CM

## 2022-06-12 PROCEDURE — 76770 US EXAM ABDO BACK WALL COMP: CPT

## 2022-06-20 NOTE — PROGRESS NOTES
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 with negative margins, her ultrasound is negative for recurrence, per the guidelines she will see me back in 1 year with cross-sectional imaging prior  I will call her with the results of her CT scan upcoming  ECOG currently 0, no new urologic complaints  Assessment and plan:     Please see problem oriented charting for the assessment plan of today's urological complaints      Elizabeth Rios MD      Chief Complaint     As above      History of Present Illness     John Guevara is a 43 y o  woman status post a right-sided partial nephrectomy, robot assisted for a renal lesion, this was seen to be a chromophobe renal cell carcinoma with negative margins  Doing well, accomplishing all activities of daily living, ECOG is 0, no new urologic complaints today  The following portions of the patient's history were reviewed and updated as appropriate: allergies, current medications, past family history, past medical history, past social history, past surgical history and problem list     Detailed Urologic History     - please refer to HPI    Review of Systems     Review of Systems   Constitutional: Negative  HENT: Negative  Eyes: Negative  Respiratory: Negative  Cardiovascular: Negative  Gastrointestinal: Negative  Endocrine: Negative  Genitourinary: Negative  Musculoskeletal: Negative      Skin: Negative  Allergic/Immunologic: Negative  Neurological: Negative  Hematological: Negative  Psychiatric/Behavioral: Negative  Allergies     Allergies   Allergen Reactions    Neosporin [Neomycin-Bacitracin Zn-Polymyx] Hives    Pollen Extract Allergic Rhinitis       Physical Exam     Physical Exam  Vitals reviewed  Constitutional:       General: She is not in acute distress  Appearance: Normal appearance  She is not ill-appearing, toxic-appearing or diaphoretic  HENT:      Head: Normocephalic and atraumatic  Eyes:      General: No scleral icterus  Right eye: No discharge  Left eye: No discharge  Cardiovascular:      Pulses: Normal pulses  Pulmonary:      Effort: Pulmonary effort is normal    Abdominal:      General: There is no distension  Palpations: There is no mass  Tenderness: There is no abdominal tenderness  Hernia: No hernia is present  Musculoskeletal:         General: No swelling  Skin:     General: Skin is warm  Coloration: Skin is not jaundiced  Neurological:      General: No focal deficit present  Mental Status: She is alert and oriented to person, place, and time  Cranial Nerves: No cranial nerve deficit  Psychiatric:         Mood and Affect: Mood normal          Behavior: Behavior normal          Thought Content: Thought content normal          Judgment: Judgment normal              Vital Signs  There were no vitals filed for this visit        Current Medications       Current Outpatient Medications:     dicyclomine (BENTYL) 10 mg capsule, Take 1 capsule (10 mg total) by mouth every 8 (eight) hours as needed (abd spasms/cramping) for up to 5 days, Disp: 15 capsule, Rfl: 0    Junel FE 1/20 1-20 MG-MCG per tablet, TAKE 1 TABLET BY MOUTH EVERY DAY, Disp: 84 tablet, Rfl: 0    multivitamin (THERAGRAN) TABS, Take 1 tablet by mouth daily (Patient not taking: Reported on 2/28/2022 ), Disp: , Rfl:     pantoprazole (PROTONIX) 20 mg tablet, Take 1 tablet (20 mg total) by mouth daily before breakfast As directed by GI specialist, Disp: 90 tablet, Rfl: 1      Active Problems     Patient Active Problem List   Diagnosis    S/P removal of left ovary    History of anxiety    Change in bowel habits    Epigastric abdominal tenderness    History of partial nephrectomy    History of  novel coronavirus disease (COVID-19)    Renal cell carcinoma of right kidney ([address] )    Encounter to discuss test results    Irregular bowel habits         Past Medical History     Past Medical History:   Diagnosis Date    Anemia     Female infertility     IVF pregnacny , IVF     Gestational hypertension, third trimester     resolved:  May 12, 2015    Infertility, female     Migraine     Resolved: Nov 3, 2015    Ovarian cyst     removed , 6lb cyst    Preeclampsia     Pregnancy resulting from in vitro fertilization in third trimester 2018    Seasonal allergies     Type AB blood, Rh positive     Varicella     had as child around 8years old     Visual impairment     eywear          Surgical History     Past Surgical History:   Procedure Laterality Date     SECTION, LOW TRANSVERSE  2015 daughter   Soren Needs Left     unilateral - Removal of one ovary     OVARIAN CYST REMOVAL Left     [place]  DELIVERY ONLY N/A 2018    Procedure:  SECTION () REPEAT;  Surgeon: Dorothea Nur MD;  [facility];  Service: Obstetrics    [place] LAP,PARTIAL NEPHRECTOMY Right 2022    Procedure: RIGHT ROBOTIC LAPAROSCOPIC PARTIAL NEPHRECTOMY;  Surgeon: Rachel Dacosta MD;  [facility];  Service: Urology         Family History     Family History   Problem Relation Age of Onset    Varicose Veins Mother     Cirrhosis Mother     Alcohol abuse Mother     Hyperlipidemia Father     Arthritis Maternal Grandmother     Hypertension Maternal Grandfather     Lung cancer Paternal Grandfather     Breast cancer Maternal Aunt 36    Cancer Maternal Aunt     Substance Abuse Neg Hx     Mental illness Neg Hx     Depression Neg Hx          Social History     Social History     Social History     Tobacco Use   Smoking Status Never Smoker   Smokeless Tobacco Never Used         Pertinent Lab Values     Lab Results   Component Value Date    CREATININE 0 82 06/01/2022                 Pertinent Imaging       The patient's images were reviewed by me personally and also in real time with them in the examination room using our PACS imaging system  The imaging findings are significant for a normal kidney status post partial nephrectomy, no evidence of recurrence of disease  Of note, the patient is still scheduled for a CT scan of the abdomen with and without contrast as per guidelines, this was delayed due to the contrast shortage

## 2022-06-22 ENCOUNTER — OFFICE VISIT (OUTPATIENT)
Dept: UROLOGY | Facility: CLINIC | Age: 37
End: 2022-06-22
Payer: COMMERCIAL

## 2022-06-22 VITALS
HEIGHT: 67 IN | DIASTOLIC BLOOD PRESSURE: 62 MMHG | SYSTOLIC BLOOD PRESSURE: 110 MMHG | BODY MASS INDEX: 24.77 KG/M2 | WEIGHT: 157.8 LBS | HEART RATE: 73 BPM | OXYGEN SATURATION: 100 % | RESPIRATION RATE: 16 BRPM

## 2022-06-22 DIAGNOSIS — Z71.2 ENCOUNTER TO DISCUSS TEST RESULTS: ICD-10-CM

## 2022-06-22 DIAGNOSIS — C64.1 RENAL CELL CARCINOMA OF RIGHT KIDNEY (HCC): Primary | ICD-10-CM

## 2022-06-22 PROCEDURE — 99214 OFFICE O/P EST MOD 30 MIN: CPT | Performed by: UROLOGY

## 2022-06-22 PROCEDURE — 3008F BODY MASS INDEX DOCD: CPT | Performed by: UROLOGY

## 2022-06-22 PROCEDURE — 1036F TOBACCO NON-USER: CPT | Performed by: UROLOGY

## 2022-07-05 ENCOUNTER — ANNUAL EXAM (OUTPATIENT)
Dept: OBGYN CLINIC | Facility: CLINIC | Age: 37
End: 2022-07-05
Payer: COMMERCIAL

## 2022-07-05 VITALS
WEIGHT: 157 LBS | BODY MASS INDEX: 24.64 KG/M2 | HEIGHT: 67 IN | DIASTOLIC BLOOD PRESSURE: 70 MMHG | SYSTOLIC BLOOD PRESSURE: 116 MMHG

## 2022-07-05 DIAGNOSIS — Z01.419 ENCNTR FOR GYN EXAM (GENERAL) (ROUTINE) W/O ABN FINDINGS: Primary | ICD-10-CM

## 2022-07-05 DIAGNOSIS — Z12.31 ENCOUNTER FOR SCREENING MAMMOGRAM FOR MALIGNANT NEOPLASM OF BREAST: ICD-10-CM

## 2022-07-05 DIAGNOSIS — Z00.00 HEALTHCARE MAINTENANCE: ICD-10-CM

## 2022-07-05 DIAGNOSIS — Z11.51 SCREENING FOR HUMAN PAPILLOMAVIRUS (HPV): ICD-10-CM

## 2022-07-05 PROCEDURE — 0503F POSTPARTUM CARE VISIT: CPT | Performed by: OBSTETRICS & GYNECOLOGY

## 2022-07-05 PROCEDURE — 99395 PREV VISIT EST AGE 18-39: CPT | Performed by: OBSTETRICS & GYNECOLOGY

## 2022-07-05 RX ORDER — NORETHINDRONE ACETATE AND ETHINYL ESTRADIOL 1MG-20(21)
1 KIT ORAL DAILY
Qty: 84 TABLET | Refills: 3 | Status: SHIPPED | OUTPATIENT
Start: 2022-07-05

## 2022-07-05 NOTE — PROGRESS NOTES
Krissy Soto  1985      CC:  Yearly exam    S:  43 y o  female here for yearly exam  Since her last visit, she was diagnosed with and treated for renal cell carcinoma  She was having back pain, and imaging revealed as mass on her kidney  It was resected, and her prognosis is good  She is still having difficulty with anxiety about her diagnosis and whether to have cancer genetics done  Discussed benefits of additional screening and potentially for her children  Her cycles are generally regular, not heavy or crampy  She didn't have a period between November and June but she assumes this was secondary to stress from her diagnosis  Sexual activity: She is sexually active without pain, bleeding or dryness  Contraception: She uses BCP for contraception  Last Pap 12/18/2018 - normal/negative HPV  Last Mammo 11/18/2021 - right BIRAD-1; left BIRAD-0; diagnostic 12/17/2021 - BIRAD-2  Last colonoscopy 2/9/2022 - benign polyp (done by GI for pain and changes in bowel habits)    We reviewed ASCCP guidelines for Pap testing today         Current Outpatient Medications:     Junel FE 1/20 1-20 MG-MCG per tablet, TAKE 1 TABLET BY MOUTH EVERY DAY, Disp: 84 tablet, Rfl: 0    multivitamin (THERAGRAN) TABS, Take 1 tablet by mouth daily, Disp: , Rfl:     pantoprazole (PROTONIX) 20 mg tablet, Take 1 tablet (20 mg total) by mouth daily before breakfast As directed by GI specialist, Disp: 90 tablet, Rfl: 1  Social History     Socioeconomic History    Marital status: /Civil Union     Spouse name: Not on file    Number of children: 1    Years of education: Not on file    Highest education level: Not on file   Occupational History    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 [place]    Tobacco Use    Smoking status: Never Smoker    Smokeless tobacco: Never Used   Vaping Use    Vaping Use: Never used   Substance and Sexual Activity    Alcohol use: Yes     Comment: socially    Drug use: No    Sexual activity: Yes     Partners: Male     Birth control/protection: OCP   Other Topics Concern    Not on file   Social History Narrative    Always wears seat belt     Daily caffeinated coffee consumption     Tea      Social Determinants of Health     Financial Resource Strain: Not on file   Food Insecurity: Not on file   Transportation Needs: Not on file   Physical Activity: Not on file   Stress: Not on file   Social Connections: Not on file   Intimate Partner Violence: Not on file   Housing Stability: Not on file     Family History   Problem Relation Age of Onset    Varicose Veins Mother     Cirrhosis Mother     Alcohol abuse Mother     Hyperlipidemia Father     Arthritis Maternal Grandmother     Hypertension Maternal Grandfather     Lung cancer Paternal Grandfather     Breast cancer Maternal Aunt 36    Cancer Maternal Aunt     Substance Abuse Neg Hx     Mental illness Neg Hx     Depression Neg Hx       Past Medical History:   Diagnosis Date    Anemia     Female infertility     IVF pregnacny 2017, IVF 2014    Gestational hypertension, third trimester     resolved: May 12, 2015    Infertility, female     Migraine     Resolved: Nov 3, 2015    Ovarian cyst     removed 2005, 6lb cyst    Preeclampsia     Pregnancy resulting from in vitro fertilization in third trimester 4/6/2018    Seasonal allergies     Type AB blood, Rh positive     Varicella     had as child around 8years old     Visual impairment     eywear         Review of Systems   Respiratory: Negative  Cardiovascular: Negative  Gastrointestinal: Negative for constipation and diarrhea  Genitourinary: Negative for difficulty urinating, pelvic pain, vaginal bleeding, vaginal discharge, itching or odor  O:  Blood pressure 116/70, height 5' 6 5" (1 689 m), weight 71 2 kg (157 lb), last menstrual period 06/16/2022, not currently breastfeeding      Patient appears well and is not in distress  Neck is supple without masses  Breasts are symmetrical without mass, tenderness, nipple discharge, skin changes or adenopathy  Abdomen is soft and nontender without masses  External genitals are normal without lesions or rashes  Urethral meatus and urethra are normal  Bladder is normal to palpation  Vagina is normal without discharge or bleeding  Cervix is normal without discharge or lesion  Uterus is normal, mobile, nontender without palpable mass  Adnexa are normal, nontender, without palpable mass  A:   Yearly exam      P:   Pap due 2023   BCP refill sent   Mammo slip provided     RTO one year for yearly exam or sooner as needed

## 2022-07-21 NOTE — TELEPHONE ENCOUNTER
Pt called and left  stating she has an appeal with her insurance company for recent bodywork that was done   Pt stated she needs documentation stating the reason was medically necessary in order for the insurance company to pay  [address], [place]     Pt call [id number]

## 2022-07-22 ENCOUNTER — OFFICE VISIT (OUTPATIENT)
Dept: INTERNAL MEDICINE CLINIC | Facility: CLINIC | Age: 37
End: 2022-07-22
Payer: COMMERCIAL

## 2022-07-22 VITALS
RESPIRATION RATE: 16 BRPM | OXYGEN SATURATION: 97 % | BODY MASS INDEX: 25.46 KG/M2 | DIASTOLIC BLOOD PRESSURE: 80 MMHG | TEMPERATURE: 98.1 F | WEIGHT: 158.4 LBS | HEIGHT: 66 IN | HEART RATE: 78 BPM | SYSTOLIC BLOOD PRESSURE: 120 MMHG

## 2022-07-22 DIAGNOSIS — Z00.00 WELLNESS EXAMINATION: Primary | ICD-10-CM

## 2022-07-22 DIAGNOSIS — Z13.6 ENCOUNTER FOR LIPID SCREENING FOR CARDIOVASCULAR DISEASE: ICD-10-CM

## 2022-07-22 DIAGNOSIS — Z13.220 ENCOUNTER FOR LIPID SCREENING FOR CARDIOVASCULAR DISEASE: ICD-10-CM

## 2022-07-22 PROBLEM — Z71.2 ENCOUNTER TO DISCUSS TEST RESULTS: Status: RESOLVED | Noted: 2022-03-12 | Resolved: 2022-07-22

## 2022-07-22 PROBLEM — R10.816 EPIGASTRIC ABDOMINAL TENDERNESS: Status: RESOLVED | Noted: 2021-11-18 | Resolved: 2022-07-22

## 2022-07-22 PROCEDURE — 99395 PREV VISIT EST AGE 18-39: CPT | Performed by: INTERNAL MEDICINE

## 2022-07-22 NOTE — PROGRESS NOTES
Assessment/Plan:     Diagnoses and all orders for this visit:    Wellness examination  Comments:  UTD On preventative care, doubt transient pain is of concern  she is scheduled for CT A/P later this year  f/u in 12 mos with me or prn    Encounter for lipid screening for cardiovascular disease  -     Lipid Panel with Direct LDL reflex; Future          Subjective:      Patient ID: Bud Saldivar is a 43 y o  female  HPI    Here for physical, takes OCP and PPI on a regular basis alone  Her rash from her last visit with me was a reaction to surgical glue and took prednisone course to treat  She feeling well overall since having kidney surgery for cancer  She just came back from vacation and does not feel overwhelmed or anxious  She wants to complete BW for cholesterol this year(BW ordered)  Sidra Eason has occ sharp pains at/near surgical incision sites that lasts a moment & then disappears  She has no other questions or concerns today and ROS is otherwise negative  Past Medical History:   Diagnosis Date    Anemia     Female infertility     IVF pregnacny 2017, IVF 2014    Gestational hypertension, third trimester     resolved: May 12, 2015    Infertility, female     Migraine     Resolved: Nov 3, 2015    Ovarian cyst     removed 2005, 6lb cyst    Preeclampsia     Pregnancy resulting from in vitro fertilization in third trimester 4/6/2018    Seasonal allergies     Type AB blood, Rh positive     Varicella     had as child around 8years old     Visual impairment     eywear      Vitals:    07/22/22 1357   BP: 120/80   BP Location: Left arm   Patient Position: Sitting   Cuff Size: Adult   Pulse: 78   Resp: 16   Temp: 98 1 °F (36 7 °C)   TempSrc: Tympanic   SpO2: 97%   Weight: 71 8 kg (158 lb 6 4 oz)   Height: 5' 6" (1 676 m)     Body mass index is 25 57 kg/m²      Current Outpatient Medications:     multivitamin (THERAGRAN) TABS, Take 1 tablet by mouth daily, Disp: , Rfl:     norethindrone-ethinyl estradiol (Junel FE 1/20) 1-20 MG-MCG per tablet, Take 1 tablet by mouth daily, Disp: 84 tablet, Rfl: 3    pantoprazole (PROTONIX) 20 mg tablet, Take 1 tablet (20 mg total) by mouth daily before breakfast As directed by GI specialist, Disp: 90 tablet, Rfl: 1  Allergies   Allergen Reactions    Neosporin [Neomycin-Bacitracin Zn-Polymyx] Hives    Other Rash     Hystoacryl glue(SURGICAL GLUE)    Pollen Extract Allergic Rhinitis         Review of Systems   Constitutional: Negative for fever  HENT: Negative for congestion  Eyes: Negative for visual disturbance  Respiratory: Negative for shortness of breath  Cardiovascular: Negative for chest pain  Gastrointestinal: Negative for abdominal pain  Endocrine: Negative for polyuria  Genitourinary: Negative for difficulty urinating  Musculoskeletal: Negative for arthralgias  Skin: Negative for rash  Allergic/Immunologic: Negative for immunocompromised state  Neurological: Negative for dizziness  Psychiatric/Behavioral: Negative for dysphoric mood  The patient is not nervous/anxious  Objective:      /80 (BP Location: Left arm, Patient Position: Sitting, Cuff Size: Adult)   Pulse 78   Temp 98 1 °F (36 7 °C) (Tympanic)   Resp 16   Ht 5' 6" (1 676 m)   Wt 71 8 kg (158 lb 6 4 oz)   SpO2 97%   BMI 25 57 kg/m²          Physical Exam  Vitals reviewed  Constitutional:       General: She is not in acute distress  Appearance: Normal appearance  HENT:      Head: Normocephalic and atraumatic  Right Ear: Tympanic membrane normal       Left Ear: Tympanic membrane normal    Eyes:      Conjunctiva/sclera: Conjunctivae normal    Cardiovascular:      Rate and Rhythm: Normal rate and regular rhythm  Heart sounds: No murmur heard  Pulmonary:      Effort: Pulmonary effort is normal       Breath sounds: No wheezing or rales  Abdominal:      General: Bowel sounds are normal       Palpations: Abdomen is soft  Tenderness:  There is no abdominal tenderness  Comments: Well healed scars on abd wall   Musculoskeletal:      Right lower leg: No edema  Left lower leg: No edema  Neurological:      Mental Status: She is alert  Mental status is at baseline     Psychiatric:         Mood and Affect: Mood normal          Behavior: Behavior normal

## 2022-08-08 ENCOUNTER — HOSPITAL ENCOUNTER (OUTPATIENT)
Dept: CT IMAGING | Facility: HOSPITAL | Age: 37
Discharge: HOME | End: 2022-08-08
Attending: UROLOGY
Payer: COMMERCIAL

## 2022-08-08 DIAGNOSIS — C64.1 RENAL CELL CARCINOMA OF RIGHT KIDNEY (HCC): ICD-10-CM

## 2022-08-08 PROCEDURE — 71270 CT THORAX DX C-/C+: CPT

## 2022-08-08 PROCEDURE — G1004 CDSM NDSC: HCPCS

## 2022-08-08 PROCEDURE — 74178 CT ABD&PLV WO CNTR FLWD CNTR: CPT

## 2022-08-08 RX ADMIN — IOHEXOL 70 ML: 350 INJECTION, SOLUTION INTRAVENOUS at 17:34

## 2022-08-25 ENCOUNTER — TELEPHONE (OUTPATIENT)
Dept: UROLOGY | Facility: CLINIC | Age: 37
End: 2022-08-25

## 2022-08-25 NOTE — TELEPHONE ENCOUNTER
PT BROUGHT IN A LETTER FROM [facility] ASKING FOR RECORDS TO APPEAL CHARGES FOR PAT LAB WORK FOR PROCEDURE DONE ON 2/21/22  PT SIGNED RECORD RELEASE AND INFO WAS MAILED TO [facility] AS REQUESTED   LETTER AND RELEASE SCANNED INTO THE CHART

## 2022-11-05 DIAGNOSIS — R10.816 EPIGASTRIC ABDOMINAL TENDERNESS, REBOUND TENDERNESS PRESENCE NOT SPECIFIED: ICD-10-CM

## 2022-11-06 RX ORDER — PANTOPRAZOLE SODIUM 20 MG/1
20 TABLET, DELAYED RELEASE ORAL
Qty: 90 TABLET | Refills: 1 | Status: SHIPPED | OUTPATIENT
Start: 2022-11-06

## 2022-11-23 ENCOUNTER — HOSPITAL ENCOUNTER (OUTPATIENT)
Dept: RADIOLOGY | Facility: HOSPITAL | Age: 37
Discharge: HOME | End: 2022-11-23

## 2022-11-23 DIAGNOSIS — Z12.31 ENCOUNTER FOR SCREENING MAMMOGRAM FOR MALIGNANT NEOPLASM OF BREAST: ICD-10-CM

## 2022-11-30 ENCOUNTER — APPOINTMENT (OUTPATIENT)
Dept: RADIOLOGY | Age: 37
End: 2022-11-30

## 2022-11-30 ENCOUNTER — OFFICE VISIT (OUTPATIENT)
Dept: PODIATRY | Facility: CLINIC | Age: 37
End: 2022-11-30

## 2022-11-30 VITALS
HEART RATE: 83 BPM | WEIGHT: 158 LBS | BODY MASS INDEX: 25.39 KG/M2 | SYSTOLIC BLOOD PRESSURE: 127 MMHG | HEIGHT: 66 IN | DIASTOLIC BLOOD PRESSURE: 83 MMHG

## 2022-11-30 DIAGNOSIS — M25.571 RIGHT ANKLE PAIN, UNSPECIFIED CHRONICITY: ICD-10-CM

## 2022-11-30 DIAGNOSIS — S93.401A SEVERE ANKLE SPRAIN, RIGHT, INITIAL ENCOUNTER: ICD-10-CM

## 2022-11-30 DIAGNOSIS — M25.571 RIGHT ANKLE PAIN, UNSPECIFIED CHRONICITY: Primary | ICD-10-CM

## 2022-11-30 NOTE — PATIENT INSTRUCTIONS
Ankle Exercises   AMBULATORY CARE:   What you need to know about ankle exercises: Ankle exercises help strengthen your ankle and improve its function after injury  These are beginning exercises  Ask your healthcare provider if you need to see a physical therapist for more advanced exercises  Do these exercises 3 to 5 days a week , or as directed by your healthcare provider  Ask if you should perform the exercises on each ankle  Do the exercises in the order that your healthcare provider recommends  This will help prevent swelling, chronic pain, and reinjury  Start with range of motion exercises  Then progress to strengthening exercises, and finally to balancing exercises  Warm up before you do ankle exercises  Walk or ride a stationary bike for 5 to 10 minutes to prepare your ankle for movement  Stop if you feel pain  It is normal to feel some discomfort at first  Regular exercise will help decrease your discomfort over time  How to perform range of motion exercises safely:  Begin with range of motion exercises to improve flexibility  Ask your healthcare provider when you can progress to strengthening exercises  Ankle alphabet:  Sit on a chair so that your feet do not touch the floor  Use your big toe to write each letter of the alphabet  Use only your foot and ankle, and keep your movements small  Do 2 sets  Calf stretches:      Sitting calf stretches with a towel:  Sit on the floor with both legs out straight in front of you  Loop a towel around the ball of your injured foot  Grasp the ends of the towel and pull it toward you  Keep your leg and back straight  Do not lean forward as you pull the towel  Hold for 30 seconds  Then relax for 30 seconds  Do 2 sets of 10  Standing calf stretches:  Stand facing a wall with the foot that is not injured forward and your knee slightly bent  Keep the leg with the injured foot straight and behind you with your toes pointed in slightly   With both heels flat on the floor, press your hips forward  Do not arch your back  Hold for 30 seconds, and then relax for 30 seconds  Do 2 sets of 10  Repeat with your leg bent  Do 2 sets of 10  How to perform strengthening exercises safely:  After you can perform range of motion exercises without pain, you may begin strengthening exercises  Ask your healthcare provider when you can progress to balancing exercises  Ankle movement in 4 directions:  Sit on the floor with your legs straight in front of you  Keep your heels on the floor for support  Dorsiflexion:  Begin with your toes pointing straight up  Pull your toes toward your body  Slowly return to the starting position  Do 3 sets of 5  Plantar flexion:  Begin with your toes pointing straight up  Push your toes away from your body  Slowly return to the starting position  Do 3 sets of 5  Inversion:  Begin with your toes pointing straight up  Push your toes inward, toward each other  Slowly return to the starting position  Do 3 sets of 5  Eversion:  Begin with your toes pointing straight up  Push your toes outward, away from each other  Slowly return to the starting position  Do 3 sets of 5  Toe curls with a towel:  Sit on a chair so that both of your feet are flat on the floor  Place a small towel on the floor in front of your injured foot  Grab the center of the towel with your toes and curl the towel toward you  Relax and repeat  Do 1 set of 5  [place]arson pick-ups:  Sit on a chair so that both of your feet are flat on the floor  Place 20 marbles on the floor in front of your injured foot  Use your toes to  one marble at a time and place it into a bowl  Repeat until you have picked up all the marbles  Do 1 set  Heel raises:      Single leg heel raises:  Stand with your weight evenly on both feet  Hold on to a chair or a wall for balance   Lift the foot that is not injured off the floor so all your weight is placed on your injured foot  Raise the heel of your injured foot as high as you can  Slowly lower your heel to the floor  Do 1 set of 10  Double leg heel raises:  Stand with your weight evenly on both feet  Hold on to a chair or a wall for balance  Raise both of your heels as high as you can  Slowly lower your heels to the floor  Do 1 set of 10  Heel and toe walks:      Heel walks:  Begin in a standing position  Lift your toes off the floor and walk on your heels  Keep your toes lifted as high as possible  Do 2 sets of 10  Toe walks:  Begin in a standing position  Lift your heels off the floor and walk on the balls and toes of your feet  Keep your heels lifted as high as possible  Do 2 sets of 10  How to perform a balance exercise safely:  After you can perform strengthening exercises without pain, you may do this beginning balancing exercise  Ask your healthcare provider for more advanced balance exercises  Single leg stance:  Stand with your weight evenly on both feet, or hold on to a chair or a wall  Do not lean to the side  Lift the foot that is not injured off the floor so all your weight is placed on your injured foot  Balance on your injured foot  Ask your healthcare provider how long to hold this position  Contact your healthcare provider if:   Your pain becomes worse  You have new pain  You have questions or concerns about your condition, care, or exercise program     © Copyright [employer] 2022 Information is for End User's use only and may not be sold, redistributed or otherwise used for commercial purposes  All illustrations and images included in CareNotes® are the copyrighted property of A D A M , Inc  or [facility] Berenice Resendez   The above information is an  only  It is not intended as medical advice for individual conditions or treatments   Talk to your doctor, nurse or pharmacist before following any medical regimen to see if it is safe and effective for you

## 2022-11-30 NOTE — PROGRESS NOTES
Assessment/Plan:         Diagnoses and all orders for this visit:    Right ankle pain, unspecified chronicity  -     X-ray ankle right 3+ views; Future  -     XR tibia fibula 2 vw right; Future    Severe ankle sprain, right, initial encounter        Diagnosis and options discussed with patient  Patient agreeable to the plan as stated below    RICE protocol  XR reviewed, no fracture at ankle or proximal fibular    Sprain was rather significant  Recommended formal PT but she prefers to rehab at home  Educated on ankle exercises, perform twice per day  Reappoint in 6 weeks if symptoms are not improving  Subjective:      Patient ID: Omero Squires is a 42 y o  female  Patient sprained her ankle 10 days ago  She thought she just sprained it but the pain isn;t going away  She is swollen  The following portions of the patient's history were reviewed and updated as appropriate: allergies, current medications, past family history, past medical history, past social history, past surgical history and problem list     Review of Systems    Constitutional: Negative  HENT: Negative for sinus pressure and sinus pain  Respiratory: Negative for cough and shortness of breath  Cardiovascular: Negative for chest pain and leg swelling  Gastrointestinal: Negative for diarrhea, nausea and vomiting  Musculoskeletal: ankle sprain  Skin: Negative for rash or wound  Neurological: Negative for weakness, numbness and headaches  Psychiatric/Behavioral: The patient is not nervous/anxious  Objective:      /83   Pulse 83   Ht 5' 6" (1 676 m)   Wt 71 7 kg (158 lb)   LMP 10/31/2022   BMI 25 50 kg/m²          Physical Exam  Vitals reviewed  Constitutional:       General: She is not in acute distress  Appearance: She is normal weight  She is not toxic-appearing  Cardiovascular:      Rate and Rhythm: Normal rate  Pulses: Normal pulses     Pulmonary:      Effort: Pulmonary effort is normal  No respiratory distress  Musculoskeletal:      Right ankle: Swelling present  No deformity, ecchymosis or lacerations  Tenderness present over the lateral malleolus, ATF ligament, CF ligament and proximal fibula  No base of 5th metatarsal tenderness  Normal range of motion  Anterior drawer test negative  Normal pulse  Right Achilles Tendon: No tenderness or defects  Ervin's test negative  Neurological:      Mental Status: She is alert  XRay 3 views of the right ankle and tib/fib personally read by Dr Louis Zapata in office today and discussed with patient:  1  Ankle mortise intact  2  No evidence of fracture at ankle or proximal fibular  3  No acute findings in my opinion

## 2022-12-16 ENCOUNTER — OFFICE VISIT (OUTPATIENT)
Dept: INTERNAL MEDICINE CLINIC | Facility: CLINIC | Age: 37
End: 2022-12-16

## 2022-12-16 VITALS
WEIGHT: 162 LBS | HEART RATE: 88 BPM | HEIGHT: 66 IN | BODY MASS INDEX: 26.03 KG/M2 | OXYGEN SATURATION: 98 % | RESPIRATION RATE: 15 BRPM | DIASTOLIC BLOOD PRESSURE: 76 MMHG | SYSTOLIC BLOOD PRESSURE: 118 MMHG

## 2022-12-16 DIAGNOSIS — S93.401S SPRAIN OF RIGHT ANKLE, UNSPECIFIED LIGAMENT, SEQUELA: ICD-10-CM

## 2022-12-16 DIAGNOSIS — M54.9 UPPER BACK PAIN: Primary | ICD-10-CM

## 2022-12-16 DIAGNOSIS — R10.9 SIDE PAIN: ICD-10-CM

## 2022-12-16 DIAGNOSIS — Z23 FLU VACCINE NEED: ICD-10-CM

## 2022-12-16 NOTE — PATIENT INSTRUCTIONS
Try stretching at home  Tylenol as needed    Core Strengthening Exercises   AMBULATORY CARE:   What you need to know about core strengthening exercises: Your core includes the muscles of your lower back, hip, pelvis, and abdomen  Core strengthening exercises help heal and strengthen these muscles  This helps prevent another injury, and keeps your pelvis, spine, and hips in the correct position  Contact your healthcare provider if:   You have sharp or worsening pain during exercise or at rest     You have questions or concerns about your shoulder exercises  Safety tips:  Talk to your healthcare provider before you start an exercise program  A physical therapist can teach you how to do core strengthening exercises safely  Do the exercises on a mat or firm surface  A firm surface will support your spine and prevent low back pain  Do not do these exercises on a bed  Move slowly and smoothly  Avoid fast or jerky motions  Stop if you feel pain  Core exercises should not be painful  Stop if you feel pain  Breathe normally during core exercises  Do not hold your breath  This may cause an increase in blood pressure and prevent muscle strengthening  Your healthcare provider will tell you when to inhale and exhale during the exercise  Begin all of your exercises with abdominal bracing  Abdominal bracing helps warm up your core muscles  You can also practice abdominal bracing throughout the day  Lie on your back with your knees bent and feet flat on the floor  Place your arms in a relaxed position beside your body  Tighten your abdominal muscles  Pull your belly button in and up toward your spine  Hold for 5 seconds  Relax your muscles  Repeat 10 times  Core strengthening exercises: Your healthcare provider will tell you how often to do these exercises  The provider will also tell you how many repetitions of each exercise you should do  Hold each exercise for 5 seconds or as directed   As you get stronger, increase your hold to 10 to 15 seconds  You can do some of these exercises on a stability ball, or with a weight  Ask your healthcare provider how to use a stability ball or weight for these exercises:  Bridging:  Lie on your back with your knees bent and feet flat on the floor  Rest your arms at your side  Tighten your buttocks, and then lift your hips 1 inch off the floor  Hold for 5 seconds  When you can do this exercise without pain for 10 seconds, increase the distance you lift your hips  A good goal is to be able to lift your hips so that your shoulders, hips, and knees are in a straight line  Dead bug:  Lie on your back with your knees bent and feet flat on the floor  Place your arms in a relaxed position beside your body  Begin with abdominal bracing  Next, raise one leg, keeping your knee bent  Hold for 5 seconds  Repeat with the other leg  When you can do this exercise without pain for 10 to 15 seconds, you may raise one straight leg and hold  Repeat with the other leg  Quadruped:  Place your hands and knees on the floor  Keep your wrists directly below your shoulders and your knees directly below your hips  Pull your belly button in toward your spine  Do not flatten or arch your back  Tighten your abdominal muscles below your belly button  Hold for 5 seconds  When you can do this exercise without pain for 10 to 15 seconds, you may extend one arm and hold  Repeat on the other side  Side bridge exercises:      Standing side bridge:  Stand next to a wall and extend one arm toward the wall  Place your palm flat on the wall with your fingers pointing upward  Begin with abdominal bracing  Next, without moving your feet, slowly bend your arm to 90 degrees  Hold for 5 seconds  Repeat on the other side  When you can do this exercise without pain for 10 to 15 seconds, you may do the bent leg side bridge on the floor           Bent leg side bridge:  Lie on one side with your legs, hips, and shoulders in a straight line  Prop yourself up onto your forearm so your elbow is directly below your shoulder  Bend your knees back to 90 degrees  Begin with abdominal bracing  Next, lift your hips and balance yourself on your forearm and knees  Hold for 5 seconds  Repeat on the other side  When you can do this exercise without pain for 10 to 15 seconds, you may do the straight leg side bridge on the floor  Straight leg side bridge:  Lie on one side with your legs, hips, and shoulders in a straight line  Prop yourself up onto your forearm so your elbow is directly below your shoulder  Begin with abdominal bracing  Lift your hips off the floor and balance yourself on your forearm and the outside of your flexed foot  Do not let your ankle bend sideways  Hold for 5 seconds  Repeat on the other side  When you can do this exercise without pain for 10 to 15 seconds, ask your healthcare provider for more advanced exercises  Superman:  Lie on your stomach  Extend your arms forward on the floor  Tighten your abdominal muscles and lift your right hand and left leg off the floor  Hold this position  Slowly return to the starting position  Tighten your abdominal muscles and lift your left hand and right leg off the floor  Hold this position  Slowly return to the starting position  Clam:  Lie on your side with your knees bent  Put your bottom arm under your head to keep your neck in line  Put your top hand on your hip to keep your pelvis from moving  Put your heels together, and keep them together during this exercise  Slowly raise your top knee toward the ceiling  Then lower your leg so your knees are together  Repeat this exercise 10 times  Then switch sides and do the exercise 10 times with the other leg  Curl up:  Lie on your back with your knees bent and feet flat on the floor  Place your hands, palms down, underneath your lower back   Next, with your elbows on the floor, lift your shoulders and chest 2 to 3 inches off the floor  Keep your head in line with your shoulders  Hold this position  Slowly return to the starting position  Straight leg raises:  Lie on your back with one leg straight  Bend the other knee and place your foot flat on the floor  Tighten your abdominal muscles  Keep your leg straight and slowly lift it straight up 6 to 12 inches off the floor  Hold this position  Lower your leg slowly  Do as many repetitions as directed on this side  Repeat with the other leg  Plank:  Lie on your stomach  Bend your elbows and place your forearms flat on the floor  Lift your chest, stomach, and knees off the floor  Make sure your elbows are below your shoulders  Your body should be in a straight line  Do not let your hips or lower back sink to the ground  Squeeze your abdominal muscles together and hold for 15 seconds  To make this exercise harder, hold for 30 seconds or lift 1 leg at a time  Bicycles:  Lie on your back  Bend both knees and bring them toward your chest  Your calves should be parallel to the floor  Place the palms of your hands on the back of your head  Straighten your right leg and keep it lifted 2 inches off the floor  Raise your head and shoulders off the floor and twist towards your left  Keep your head and shoulders lifted  Bend your right knee while you straighten your left leg  Keep your left leg 2 inches off the floor  Twist your head and chest towards the left leg  Continue to straighten 1 leg at a time and twist        Follow up with your doctor as directed:  Write down your questions so you remember to ask them during your visits  © Copyright [employer] 2022 Information is for End User's use only and may not be sold, redistributed or otherwise used for commercial purposes   All illustrations and images included in CareNotes® are the copyrighted property of A D A M , Inc  or [facility] [employer]   The above information is an  only  It is not intended as medical advice for individual conditions or treatments  Talk to your doctor, nurse or pharmacist before following any medical regimen to see if it is safe and effective for you  Upper Back Exercises   AMBULATORY CARE:   Upper back exercises  help heal and strengthen your back muscles and prevent another injury  Ask your healthcare provider if you need to see a physical therapist for more advanced exercises  Do the exercises on a mat or firm surface  (not on a bed) to support your spine  Move slowly and smoothly  Avoid fast or jerky motions  Breathe normally  Do not hold your breath  Stop if you feel pain  It is normal to feel some discomfort at first  Regular exercise will help decrease your discomfort over time  Seek care immediately if:   You have severe pain that prevents you from moving  Contact your healthcare provider if:   Your pain becomes worse  You have new pain  You have questions or concerns about your condition, care, or exercise program     Perform upper back exercises safely:  Ask your healthcare provider which of the following exercises are best for you and how often to do them  Head rolls:  Sit in a chair or stand  Bring your chin toward your chest and roll your head to the right  Your ear should be over your shoulder  Hold this position for 5 seconds  Roll your head back toward your chest and to the left  Your ear should be over your left shoulder  Hold this position for 5 seconds  Next, roll your head back slowly in a clockwise [place] and repeat 3 times  Do 3 sets of head rolls  Scapular squeeze:  Sit or stand with your arms at your sides  Squeeze your shoulder blades together and hold for 3 seconds  Relax and repeat 3 times  Pectoralis stretch:   a doorway  Lift your hands and place them on each side of the door frame or wall slightly higher than your head   Lean forward slowly until you feel a gentle stretch  Hold for 15 seconds  Repeat 3 times, or as directed  Cat and camel exercise:  Place your hands and knees on the floor  Arch your back upward toward the ceiling and lower your head  Round out your spine as much as you can  Hold for 5 seconds  Lift your head upward and push your chest downward toward the floor  Hold for 5 seconds  Do 3 sets or as directed  Bird dog:  Place your hands and knees on the floor  Keep your wrists directly below your shoulders and your knees directly below your hips  Pull your belly button in toward your spine  Do not flatten or arch your back  Tighten your abdominal muscles  Raise one arm straight out so that it is aligned with your head  Next, raise the leg opposite your arm  Hold this position for 15 seconds  Lower your arm and leg slowly and change sides  Do 5 sets  © Copyright [employer] 2022 Information is for End User's use only and may not be sold, redistributed or otherwise used for commercial purposes  All illustrations and images included in CareNotes® are the copyrighted property of [employer] M [employer] Inc  or [facility] Berenice Resendez   The above information is an  only  It is not intended as medical advice for individual conditions or treatments  Talk to your doctor, nurse or pharmacist before following any medical regimen to see if it is safe and effective for you

## 2023-03-27 ENCOUNTER — OFFICE VISIT (OUTPATIENT)
Dept: URGENT CARE | Age: 38
End: 2023-03-27

## 2023-03-27 VITALS
OXYGEN SATURATION: 98 % | DIASTOLIC BLOOD PRESSURE: 81 MMHG | HEART RATE: 89 BPM | TEMPERATURE: 97.7 F | SYSTOLIC BLOOD PRESSURE: 135 MMHG | RESPIRATION RATE: 12 BRPM

## 2023-03-27 DIAGNOSIS — J02.0 STREP PHARYNGITIS: Primary | ICD-10-CM

## 2023-03-27 LAB — S PYO AG THROAT QL: POSITIVE

## 2023-03-27 RX ORDER — AMOXICILLIN 500 MG/1
500 TABLET, FILM COATED ORAL 2 TIMES DAILY
Qty: 20 TABLET | Refills: 0 | Status: SHIPPED | OUTPATIENT
Start: 2023-03-27 | End: 2023-04-06

## 2023-03-28 NOTE — PATIENT INSTRUCTIONS
Please complete full 10 days of antibiotic therapy  After 3 days of antibiotic therapy, please throw away your current toothbrush and begin using a new one  1   Drink plenty fluids  2   Take probiotics [i e  Yogurt, Acidophilus, Florastor (liquid)] daily  3   Over-the-counter medications as needed for symptomatic care  4    Advance activities as tolerated  5    Follow-up with your primary care physician in 3-4 days  6   Go to emergency room if symptoms are worsening      7   Use a humidifier at bedtime
Yes

## 2023-03-28 NOTE — PROGRESS NOTES
[facility]        NAME: Roopa Martinez is a 42 y o  female  : 1985    MRN: [id number]  DATE: 2023  TIME: 8:32 PM    Assessment and Plan   Strep pharyngitis [J02 0]  1  Strep pharyngitis  POCT rapid strepA    amoxicillin (AMOXIL) 500 MG tablet            Patient Instructions     Please complete full 10 days of antibiotic therapy  After 3 days of antibiotic therapy, please throw away your current toothbrush and begin using a new one  Follow up with PCP in 3-5 days  Proceed to  ER if symptoms worsen  Chief Complaint     Chief Complaint   Patient presents with   • Sore Throat     Started today   • Earache     Right ear         History of Present Illness       Patient presenting for evaluation of sore throat and ear pain  Patient states that her symptoms have been progressing over the past 24 hours  She states that she has a history of acid reflux, and was unsure if her sore throat was related to this, so she trialed Pepcid as well as throat lozenges with minimal relief  She denies any fevers, chills, cough or congestion  Patient denies any known sick exposures  Review of Systems   Review of Systems   Constitutional: Positive for chills  Negative for fever  HENT: Positive for sore throat  Negative for congestion  Respiratory: Negative for cough and shortness of breath  Cardiovascular: Negative for chest pain  Gastrointestinal: Negative for diarrhea, nausea and vomiting  All other systems reviewed and are negative          Current Medications       Current Outpatient Medications:   •  amoxicillin (AMOXIL) 500 MG tablet, Take 1 tablet (500 mg total) by mouth 2 (two) times a day for 10 days, Disp: 20 tablet, Rfl: 0  •  multivitamin (THERAGRAN) TABS, Take 1 tablet by mouth daily, Disp: , Rfl:   •  norethindrone-ethinyl estradiol (Junel FE 1/20) 1-20 MG-MCG per tablet, Take 1 tablet by mouth daily, Disp: 84 tablet, Rfl: 3  •  pantoprazole (PROTONIX) 20 mg tablet, TAKE 1 TABLET (20 MG TOTAL) BY MOUTH DAILY BEFORE BREAKFAST AS DIRECTED BY GI SPECIALIST, Disp: 90 tablet, Rfl: 1    Current Allergies     Allergies as of 2023 - Reviewed 2023   Allergen Reaction Noted   • Neosporin [neomycin-bacitracin zn-polymyx] Hives 2014   • Other Rash 2022   • Pollen extract Allergic Rhinitis 2014            The following portions of the patient's history were reviewed and updated as appropriate: allergies, current medications, past family history, past medical history, past social history, past surgical history and problem list      Past Medical History:   Diagnosis Date   • Anemia    • Female infertility     IVF pregnacny , IVF    • Gestational hypertension, third trimester     resolved:  May 12, 2015   • Infertility, female    • Migraine     Resolved: Nov 3, 2015   • Ovarian cyst     removed , 6lb cyst   • Preeclampsia    • Pregnancy resulting from in vitro fertilization in third trimester 2018   • Seasonal allergies    • Type AB blood, Rh positive    • Varicella     had as child around 8years old    • Visual impairment     eywear        Past Surgical History:   Procedure Laterality Date   •  SECTION, LOW TRANSVERSE  2015    2015 daughter   • [address]   • OOPHORECTOMY Left     unilateral - Removal of one ovary    • OVARIAN CYST REMOVAL Left    • [place]  DELIVERY ONLY N/A 2018    Procedure:  SECTION () REPEAT;  Surgeon: Mouna Armijo MD;  [facility]: [facility] ;  Service: Obstetrics   • DE LAPAROSCOPY SURG PARTIAL NEPHRECTOMY Right 2022    Procedure: RIGHT ROBOTIC LAPAROSCOPIC PARTIAL NEPHRECTOMY;  Surgeon: Hang Mcgregor MD;  [facility];  Service: Urology       Family History   Problem Relation Age of Onset   • Varicose Veins Mother    • Cirrhosis Mother    • Alcohol abuse Mother    • Hyperlipidemia Father    • Arthritis Maternal Grandmother    • Hypertension Maternal Grandfather    • Lung cancer Paternal Grandfather    • Breast cancer Maternal Aunt 36   • Cancer Maternal Aunt    • Substance Abuse Neg Hx    • Mental illness Neg Hx    • Depression Neg Hx          Medications have been verified  Objective   /81 (BP Location: Left arm, Patient Position: Sitting, Cuff Size: Standard)   Pulse 89   Temp 97 7 °F (36 5 °C) (Temporal)   Resp 12   SpO2 98%        Physical Exam     Physical Exam  Vitals and nursing note reviewed  Constitutional:       General: She is not in acute distress  Appearance: Normal appearance  She is normal weight  She is not ill-appearing, toxic-appearing or diaphoretic  HENT:      Head: Normocephalic and atraumatic  Right Ear: Tympanic membrane normal       Left Ear: Tympanic membrane normal       Ears:      Comments: Scant amount of serous fluid noted behind right tympanic membrane, no signs of infection     Nose: Nose normal  No congestion or rhinorrhea  Mouth/Throat:      Mouth: Mucous membranes are moist       Pharynx: Oropharynx is clear  Posterior oropharyngeal erythema present  No oropharyngeal exudate  Tonsils: 1+ on the right  1+ on the left  Eyes:      General:         Right eye: No discharge  Left eye: No discharge  Cardiovascular:      Rate and Rhythm: Normal rate and regular rhythm  Pulses: Normal pulses  Heart sounds: Normal heart sounds  No murmur heard  No friction rub  No gallop  Pulmonary:      Effort: Pulmonary effort is normal  No respiratory distress  Breath sounds: Normal breath sounds  No stridor  No wheezing, rhonchi or rales  Chest:      Chest wall: No tenderness  Abdominal:      General: Bowel sounds are normal       Palpations: Abdomen is soft  Tenderness: There is no abdominal tenderness  Skin:     General: Skin is warm and dry  Neurological:      Mental Status: She is alert     Psychiatric:         Mood and Affect: Mood normal  Behavior: Behavior normal

## 2023-05-07 DIAGNOSIS — R10.816 EPIGASTRIC ABDOMINAL TENDERNESS, REBOUND TENDERNESS PRESENCE NOT SPECIFIED: ICD-10-CM

## 2023-05-08 RX ORDER — PANTOPRAZOLE SODIUM 20 MG/1
20 TABLET, DELAYED RELEASE ORAL
Qty: 90 TABLET | Refills: 1 | Status: SHIPPED | OUTPATIENT
Start: 2023-05-08

## 2023-05-22 ENCOUNTER — APPOINTMENT (OUTPATIENT)
Dept: LAB | Facility: CLINIC | Age: 38
End: 2023-05-22

## 2023-05-22 DIAGNOSIS — Z13.6 ENCOUNTER FOR LIPID SCREENING FOR CARDIOVASCULAR DISEASE: ICD-10-CM

## 2023-05-22 DIAGNOSIS — Z13.220 ENCOUNTER FOR LIPID SCREENING FOR CARDIOVASCULAR DISEASE: ICD-10-CM

## 2023-05-22 DIAGNOSIS — C64.1 RENAL CELL CARCINOMA OF RIGHT KIDNEY (HCC): ICD-10-CM

## 2023-05-22 LAB
ALBUMIN SERPL BCP-MCNC: 4.2 G/DL (ref 3.5–5)
ALP SERPL-CCNC: 46 U/L (ref 34–104)
ALT SERPL W P-5'-P-CCNC: 12 U/L (ref 7–52)
ANION GAP SERPL CALCULATED.3IONS-SCNC: 6 MMOL/L (ref 4–13)
AST SERPL W P-5'-P-CCNC: 14 U/L (ref 13–39)
BASOPHILS # BLD AUTO: 0.02 THOUSANDS/ÂΜL (ref 0–0.1)
BASOPHILS NFR BLD AUTO: 0 % (ref 0–1)
BILIRUB SERPL-MCNC: 0.59 MG/DL (ref 0.2–1)
BUN SERPL-MCNC: 15 MG/DL (ref 5–25)
CALCIUM SERPL-MCNC: 9.1 MG/DL (ref 8.4–10.2)
CHLORIDE SERPL-SCNC: 105 MMOL/L (ref 96–108)
CHOLEST SERPL-MCNC: 183 MG/DL
CO2 SERPL-SCNC: 28 MMOL/L (ref 21–32)
CREAT SERPL-MCNC: 0.78 MG/DL (ref 0.6–1.3)
EOSINOPHIL # BLD AUTO: 0.04 THOUSAND/ÂΜL (ref 0–0.61)
EOSINOPHIL NFR BLD AUTO: 1 % (ref 0–6)
ERYTHROCYTE [DISTWIDTH] IN BLOOD BY AUTOMATED COUNT: 14 % (ref 11.6–15.1)
GFR SERPL CREATININE-BSD FRML MDRD: 96 ML/MIN/1.73SQ M
GLUCOSE P FAST SERPL-MCNC: 92 MG/DL (ref 65–99)
HCT VFR BLD AUTO: 38.2 % (ref 34.8–46.1)
HDLC SERPL-MCNC: 65 MG/DL
HGB BLD-MCNC: 12.4 G/DL (ref 11.5–15.4)
IMM GRANULOCYTES # BLD AUTO: 0.03 THOUSAND/UL (ref 0–0.2)
IMM GRANULOCYTES NFR BLD AUTO: 1 % (ref 0–2)
LDLC SERPL CALC-MCNC: 104 MG/DL (ref 0–100)
LYMPHOCYTES # BLD AUTO: 2.14 THOUSANDS/ÂΜL (ref 0.6–4.47)
LYMPHOCYTES NFR BLD AUTO: 36 % (ref 14–44)
MCH RBC QN AUTO: 29.4 PG (ref 26.8–34.3)
MCHC RBC AUTO-ENTMCNC: 32.5 G/DL (ref 31.4–37.4)
MCV RBC AUTO: 91 FL (ref 82–98)
MONOCYTES # BLD AUTO: 0.25 THOUSAND/ÂΜL (ref 0.17–1.22)
MONOCYTES NFR BLD AUTO: 4 % (ref 4–12)
NEUTROPHILS # BLD AUTO: 3.43 THOUSANDS/ÂΜL (ref 1.85–7.62)
NEUTS SEG NFR BLD AUTO: 58 % (ref 43–75)
NRBC BLD AUTO-RTO: 0 /100 WBCS
PLATELET # BLD AUTO: 190 THOUSANDS/UL (ref 149–390)
PMV BLD AUTO: 10.9 FL (ref 8.9–12.7)
POTASSIUM SERPL-SCNC: 3.6 MMOL/L (ref 3.5–5.3)
PROT SERPL-MCNC: 7.1 G/DL (ref 6.4–8.4)
RBC # BLD AUTO: 4.22 MILLION/UL (ref 3.81–5.12)
SODIUM SERPL-SCNC: 139 MMOL/L (ref 135–147)
TRIGL SERPL-MCNC: 72 MG/DL
WBC # BLD AUTO: 5.91 THOUSAND/UL (ref 4.31–10.16)

## 2023-06-05 ENCOUNTER — HOSPITAL ENCOUNTER (OUTPATIENT)
Dept: CT IMAGING | Facility: HOSPITAL | Age: 38
Discharge: HOME | End: 2023-06-05
Attending: UROLOGY
Payer: COMMERCIAL

## 2023-06-05 DIAGNOSIS — C64.1 RENAL CELL CARCINOMA OF RIGHT KIDNEY (HCC): ICD-10-CM

## 2023-06-05 PROCEDURE — 74178 CT ABD&PLV WO CNTR FLWD CNTR: CPT

## 2023-06-05 PROCEDURE — G1004 CDSM NDSC: HCPCS

## 2023-06-05 RX ADMIN — IOHEXOL 100 ML: 350 INJECTION, SOLUTION INTRAVENOUS at 12:25

## 2023-06-26 PROBLEM — Z08 ENCOUNTER FOR FOLLOW-UP SURVEILLANCE OF KIDNEY CANCER: Status: ACTIVE | Noted: 2023-06-26

## 2023-06-26 PROBLEM — Z85.528 ENCOUNTER FOR FOLLOW-UP SURVEILLANCE OF KIDNEY CANCER: Status: ACTIVE | Noted: 2023-06-26

## 2023-06-26 NOTE — PROGRESS NOTES
"     Problem List Items Addressed This Visit        Genitourinary    Renal cell carcinoma of right kidney ([address] ) - Primary       Other    History of partial nephrectomy    Encounter for follow-up surveillance of kidney cancer           Discussion:      Loni Hills is well  She is KYERE status at this time, her scans look great and are without recurrence  ECOG is 0  She had an accidental fall down the stairs and has been having some right rib cage pain  No fracture on her CT scan  Unclear if due to this fall or due to surgical allodynia  I will see her back in 1 year with a renal u/s prior  I will continue seeing her yearly even after 5 years of follow up given her young age    [de-identified] of the above record have been created with voice recognition software  Occasional wrong word or \"sound alike\" substitution may have occurred due to the inherent limitations of voice recognition software  Read the chart carefully and recognize, using context, where substitution may have occurred  Assessment and plan:       Please see problem oriented charting for the assessment plan of today's urological complaints      Terri Conner MD      Chief Complaint     As listed above      History of Present Illness     Mitzi Hernandez is a 42 y o  woman with a 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  She is well  KYREE status at this time      The following portions of the patient's history were reviewed and updated as appropriate: allergies, current medications, past family history, past medical history, past social history, past surgical history and problem list     Detailed Urologic History     - please refer to HPI    Review of Systems     Review of Systems   Constitutional: Negative  HENT: Negative  Eyes: Negative  Respiratory: Negative  Cardiovascular: Negative  Gastrointestinal: Negative  Endocrine: Negative  Genitourinary: Negative  Musculoskeletal: Negative          " Some right rib pain   Skin: Negative  Allergic/Immunologic: Negative  Neurological: Negative  Hematological: Negative  Psychiatric/Behavioral: Negative  Allergies     Allergies   Allergen Reactions   • Neosporin [Neomycin-Bacitracin Zn-Polymyx] Hives   • Other Rash     Hystoacryl glue(SURGICAL GLUE)   • Pollen Extract Allergic Rhinitis       Physical Exam     Physical Exam  Vitals reviewed  Constitutional:       General: She is not in acute distress  Appearance: Normal appearance  She is not ill-appearing, toxic-appearing or diaphoretic  HENT:      Head: Normocephalic and atraumatic  Eyes:      General: No scleral icterus  Right eye: No discharge  Left eye: No discharge  Cardiovascular:      Pulses: Normal pulses  Pulmonary:      Effort: Pulmonary effort is normal    Abdominal:      General: There is no distension  Musculoskeletal:         General: No swelling  Skin:     Coloration: Skin is not jaundiced  Neurological:      General: No focal deficit present  Mental Status: She is alert  Cranial Nerves: No cranial nerve deficit  Psychiatric:         Mood and Affect: Mood normal          Behavior: Behavior normal          Thought Content: Thought content normal          Judgment: Judgment normal              Vital Signs  There were no vitals filed for this visit        Current Medications       Current Outpatient Medications:   •  multivitamin (THERAGRAN) TABS, Take 1 tablet by mouth daily, Disp: , Rfl:   •  norethindrone-ethinyl estradiol (Junel FE 1/20) 1-20 MG-MCG per tablet, Take 1 tablet by mouth daily, Disp: 84 tablet, Rfl: 3  •  pantoprazole (PROTONIX) 20 mg tablet, TAKE 1 TABLET (20 MG TOTAL) BY MOUTH DAILY BEFORE BREAKFAST AS DIRECTED BY GI SPECIALIST, Disp: 90 tablet, Rfl: 1      Active Problems     Patient Active Problem List   Diagnosis   • S/P removal of left ovary   • History of anxiety   • Change in bowel habits   • History of partial nephrectomy   • History of 2019 novel coronavirus disease (COVID-19)   • Renal cell carcinoma of right kidney (HCC)   • Irregular bowel habits   • Encounter for follow-up surveillance of kidney cancer         Past Medical History     Past Medical History:   Diagnosis Date   • Anemia    • Female infertility     IVF pregnacny , IVF    • Gestational hypertension, third trimester     resolved:  May 12, 2015   • Infertility, female    • Migraine     Resolved: Nov 3, 2015   • Ovarian cyst     removed , 6lb cyst   • Preeclampsia    • Pregnancy resulting from in vitro fertilization in third trimester 2018   • Seasonal allergies    • Type AB blood, Rh positive    • Varicella     had as child around 8years old    • Visual impairment     eywear          Surgical History     Past Surgical History:   Procedure Laterality Date   •  SECTION, LOW TRANSVERSE  2015    2015 daughter   • MOUTH FLOOR MASS EXCISION     • OOPHORECTOMY Left     unilateral - Removal of one ovary    • OVARIAN CYST REMOVAL Left    • [place]  DELIVERY ONLY N/A 2018    Procedure:  SECTION () REPEAT;  Surgeon: Bernice Garza MD;  [facility];  Service: Obstetrics   • [place] LAPAROSCOPY SURG PARTIAL NEPHRECTOMY Right 2022    Procedure: RIGHT ROBOTIC LAPAROSCOPIC PARTIAL NEPHRECTOMY;  Surgeon: Marixa Maxwell MD;  [facility];  Service: Urology         Family History     Family History   Problem Relation Age of Onset   • Varicose Veins Mother    • Cirrhosis Mother    • Alcohol abuse Mother    • Hyperlipidemia Father    • Arthritis Maternal Grandmother    • Hypertension Maternal Grandfather    • Lung cancer Paternal Grandfather    • Breast cancer Maternal Aunt 36   • Cancer Maternal Aunt    • Substance Abuse Neg Hx    • Mental illness Neg Hx    • Depression Neg Hx          Social History     Social History     Social History     Tobacco Use   Smoking Status Never Smokeless Tobacco Never         Pertinent Lab Values     Lab Results   Component Value Date    CREATININE 0 78 05/22/2023           HgB 12 4      Pertinent Imaging       The patient's images were reviewed by me personally and also in real time with them in the examination room using our PACS imaging system  The imaging findings are significant for:    IMPRESSION:  1  Status post partial nephrectomy of the lower pole right kidney  No enhancing solid or cystic renal mass to suggest recurrent disease  2  No evidence of metastatic disease in the chest abdomen or pelvis  Spike Childs

## 2023-06-27 DIAGNOSIS — Z00.00 HEALTHCARE MAINTENANCE: ICD-10-CM

## 2023-06-27 RX ORDER — NORETHINDRONE ACETATE AND ETHINYL ESTRADIOL AND FERROUS FUMARATE 1MG-20(21)
KIT ORAL
Qty: 84 TABLET | Refills: 3 | Status: SHIPPED | OUTPATIENT
Start: 2023-06-27

## 2023-06-30 ENCOUNTER — OFFICE VISIT (OUTPATIENT)
Dept: UROLOGY | Facility: CLINIC | Age: 38
End: 2023-06-30
Payer: COMMERCIAL

## 2023-06-30 VITALS
WEIGHT: 162 LBS | HEIGHT: 66 IN | DIASTOLIC BLOOD PRESSURE: 80 MMHG | BODY MASS INDEX: 26.03 KG/M2 | HEART RATE: 74 BPM | SYSTOLIC BLOOD PRESSURE: 120 MMHG | OXYGEN SATURATION: 98 %

## 2023-06-30 DIAGNOSIS — C64.1 RENAL CELL CARCINOMA OF RIGHT KIDNEY (HCC): Primary | ICD-10-CM

## 2023-06-30 DIAGNOSIS — Z90.5 HISTORY OF PARTIAL NEPHRECTOMY: ICD-10-CM

## 2023-06-30 DIAGNOSIS — Z85.528 ENCOUNTER FOR FOLLOW-UP SURVEILLANCE OF KIDNEY CANCER: ICD-10-CM

## 2023-06-30 DIAGNOSIS — Z08 ENCOUNTER FOR FOLLOW-UP SURVEILLANCE OF KIDNEY CANCER: ICD-10-CM

## 2023-08-07 ENCOUNTER — OFFICE VISIT (OUTPATIENT)
Dept: INTERNAL MEDICINE CLINIC | Facility: CLINIC | Age: 38
End: 2023-08-07
Payer: COMMERCIAL

## 2023-08-07 VITALS
SYSTOLIC BLOOD PRESSURE: 118 MMHG | OXYGEN SATURATION: 98 % | TEMPERATURE: 98.5 F | BODY MASS INDEX: 26.31 KG/M2 | DIASTOLIC BLOOD PRESSURE: 82 MMHG | WEIGHT: 163 LBS | HEART RATE: 65 BPM

## 2023-08-07 DIAGNOSIS — Z00.00 WELLNESS EXAMINATION: Primary | ICD-10-CM

## 2023-08-07 DIAGNOSIS — Z13.220 ENCOUNTER FOR LIPID SCREENING FOR CARDIOVASCULAR DISEASE: ICD-10-CM

## 2023-08-07 DIAGNOSIS — Z13.6 ENCOUNTER FOR LIPID SCREENING FOR CARDIOVASCULAR DISEASE: ICD-10-CM

## 2023-08-07 PROCEDURE — 99395 PREV VISIT EST AGE 18-39: CPT | Performed by: INTERNAL MEDICINE

## 2023-08-07 NOTE — PROGRESS NOTES
Name: Carrie Berrios      : 1985      MRN: [id number]  Encounter Provider: Jarred Gamboa DO  Encounter Date: 2023   Encounter department: [address] [place] [address]     1. Wellness examination  Comments:  UTD on preventative care. she would rather not have to take PPI daily and will try taking every other day. f/u in 1 yr or prn    2. Encounter for lipid screening for cardiovascular disease  -     Lipid Panel with Direct LDL reflex; Future; Expected date: 2024      BMI Counseling: Body mass index is 26.31 kg/m². The BMI is above normal. Exercise recommendations include exercising 3-5 times per week. Rationale for BMI follow-up plan is due to patient being overweight or obese. Depression Screening and Follow-up Plan: Patient was screened for depression during today's encounter. They screened negative with a PHQ-2 score of 0. Subjective      HPI     Here for physical, here with her children during today's appt. She is overall doing well, taking PPI daily still. She does occ having acid reflux at nighttime and takes pepcid if this occurs. She saw urology for follow up/surveillance for kidney cancer s/p partial nephrectomy. She is managing her anxiety for now. She completed BW for cholesterol earlier this year. She gets an occasional right side pain when she wero on that side during sleep. It goes away with changing positions and doesn't occur at other times of the day or other positions. Otherwise she has no other questions or concerns and ROS is otherwise negative. Review of Systems   Constitutional: Negative for fever. HENT: Negative for congestion. Eyes: Negative for visual disturbance. Respiratory: Negative for shortness of breath. Cardiovascular: Negative for chest pain. Gastrointestinal: Negative for abdominal pain. Endocrine: Negative for polyuria. Genitourinary: Negative for difficulty urinating.    Musculoskeletal: Negative for arthralgias. Skin: Negative for rash. Allergic/Immunologic: Negative for immunocompromised state. Neurological: Negative for weakness. Psychiatric/Behavioral: Negative for dysphoric mood. Current Outpatient Medications on File Prior to Visit   Medication Sig   • Junel FE 1/20 1-20 MG-MCG per tablet TAKE 1 TABLET BY MOUTH EVERY DAY   • multivitamin (THERAGRAN) TABS Take 1 tablet by mouth daily   • pantoprazole (PROTONIX) 20 mg tablet TAKE 1 TABLET (20 MG TOTAL) BY MOUTH DAILY BEFORE BREAKFAST AS DIRECTED BY GI SPECIALIST       Objective     /82 (BP Location: Left arm, Patient Position: Sitting, Cuff Size: Standard)   Pulse 65   Temp 98.5 °F (36.9 °C)   Wt 73.9 kg (163 lb)   SpO2 98%   BMI 26.31 kg/m²     Physical Exam  Vitals reviewed. Constitutional:       General: She is not in acute distress. HENT:      Head: Normocephalic and atraumatic. Right Ear: Tympanic membrane normal.      Left Ear: Tympanic membrane normal.   Eyes:      Conjunctiva/sclera: Conjunctivae normal.   Cardiovascular:      Rate and Rhythm: Normal rate and regular rhythm. Heart sounds: No murmur heard. Pulmonary:      Effort: Pulmonary effort is normal.      Breath sounds: No wheezing or rales. Abdominal:      General: Bowel sounds are normal.      Palpations: Abdomen is soft. Tenderness: There is no abdominal tenderness. Musculoskeletal:      Cervical back: Neck supple. Right lower leg: No edema. Left lower leg: No edema. Neurological:      Mental Status: She is alert. Mental status is at baseline.    Psychiatric:         Mood and Affect: Mood normal.         Behavior: Behavior normal.       Results for orders placed or performed in visit on 05/22/23   CBC and differential   Result Value Ref Range    WBC 5.91 4.31 - 10.16 Thousand/uL    RBC 4.22 3.81 - 5.12 Million/uL    Hemoglobin 12.4 11.5 - 15.4 g/dL    Hematocrit 38.2 34.8 - 46.1 %    MCV 91 82 - 98 fL    MCH 29.4 26.8 - 34.3 pg    MCHC 32.5 31.4 - 37.4 g/dL    RDW 14.0 11.6 - 15.1 %    MPV 10.9 8.9 - 12.7 fL    Platelets 001 166 - 831 Thousands/uL    nRBC 0 /100 WBCs    Neutrophils Relative 58 43 - 75 %    Immat GRANS % 1 0 - 2 %    Lymphocytes Relative 36 14 - 44 %    Monocytes Relative 4 4 - 12 %    Eosinophils Relative 1 0 - 6 %    Basophils Relative 0 0 - 1 %    Neutrophils Absolute 3.43 1.85 - 7.62 Thousands/µL    Immature Grans Absolute 0.03 0.00 - 0.20 Thousand/uL    Lymphocytes Absolute 2.14 0.60 - 4.47 Thousands/µL    Monocytes Absolute 0.25 0.17 - 1.22 Thousand/µL    Eosinophils Absolute 0.04 0.00 - 0.61 Thousand/µL    Basophils Absolute 0.02 0.00 - 0.10 Thousands/µL   Comprehensive metabolic panel   Result Value Ref Range    Sodium 139 135 - 147 mmol/L    Potassium 3.6 3.5 - 5.3 mmol/L    Chloride 105 96 - 108 mmol/L    CO2 28 21 - 32 mmol/L    ANION GAP 6 4 - 13 mmol/L    BUN 15 5 - 25 mg/dL    Creatinine 0.78 0.60 - 1.30 mg/dL    Glucose, Fasting 92 65 - 99 mg/dL    Calcium 9.1 8.4 - 10.2 mg/dL    AST 14 13 - 39 U/L    ALT 12 7 - 52 U/L    Alkaline Phosphatase 46 34 - 104 U/L    Total Protein 7.1 6.4 - 8.4 g/dL    Albumin 4.2 3.5 - 5.0 g/dL    Total Bilirubin 0.59 0.20 - 1.00 mg/dL    eGFR 96 ml/min/1.73sq m   Lipid Panel with Direct LDL reflex   Result Value Ref Range    Cholesterol 183 See Comment mg/dL    Triglycerides 72 See Comment mg/dL    HDL, Direct 65 >=50 mg/dL    LDL Calculated 104 (H) 0 - 100 mg/dL         Yuriy Malone DO

## 2023-08-07 NOTE — PATIENT INSTRUCTIONS
Return in 12 months or sooner if questions    Wellness Visit for Adults   AMBULATORY CARE:   A wellness visit  is when you see your healthcare provider to get screened for health problems. Your healthcare provider will also give you advice on how to stay healthy. Write down your questions so you remember to ask them. Ask your healthcare provider how often you should have a wellness visit. What happens at a wellness visit:  Your healthcare provider will ask about your health, and your family history of health problems. This includes high blood pressure, heart disease, and cancer. He or she will ask if you have symptoms that concern you, if you smoke, and about your mood. You may also be asked about your intake of medicines, supplements, food, and alcohol. Any of the following may be done: Your weight  will be checked. Your height may also be checked so your body mass index (BMI) can be calculated. Your BMI shows if you are at a healthy weight. Your blood pressure  and heart rate will be checked. Your temperature may also be checked. Blood and urine tests  may be done. Blood tests may be done to check your cholesterol levels. Abnormal cholesterol levels increase your risk for heart disease and stroke. You may also need a blood or urine test to check for diabetes if you are at increased risk. Urine tests may be done to look for signs of an infection or kidney disease. A physical exam  includes checking your heartbeat and lungs with a stethoscope. Your healthcare provider may also check your skin to look for sun damage. Screening tests  may be recommended. A screening test is done to check for diseases that may not cause symptoms. The screening tests you may need depend on your age, gender, family history, and lifestyle habits. For example, colorectal screening may be recommended if you are 48years old or older.     Screening tests you need if you are a woman:   A Pap smear  is used to screen for cervical cancer. Pap smears are usually done every 3 to 5 years depending on your age. You may need them more often if you have had abnormal Pap smear test results in the past. Ask your healthcare provider how often you should have a Pap smear. A mammogram  is an x-ray of your breasts to screen for breast cancer. Experts recommend mammograms every 2 years starting at age 48 years. You may need a mammogram at age 52 years or younger if you have an increased risk for breast cancer. Talk to your healthcare provider about when you should start having mammograms and how often you need them. Vaccines you may need:   Get an influenza vaccine  every year. The influenza vaccine protects you from the flu. Several types of viruses cause the flu. The viruses change over time, so new vaccines are made each year. Get a tetanus-diphtheria (Td) booster vaccine  every 10 years. This vaccine protects you against tetanus and diphtheria. Tetanus is a severe infection that may cause painful muscle spasms and lockjaw. Diphtheria is a severe bacterial infection that causes a thick covering in the back of your mouth and throat. Get a human papillomavirus (HPV) vaccine  if you are female and aged 23 to 32 or male 23 to 24 and never received it. This vaccine protects you from HPV infection. HPV is the most common infection spread by sexual contact. HPV may also cause vaginal, penile, and anal cancers. Get a pneumococcal vaccine  if you are aged 72 years or older. The pneumococcal vaccine is an injection given to protect you from pneumococcal disease. Pneumococcal disease is an infection caused by pneumococcal bacteria. The infection may cause pneumonia, meningitis, or an ear infection. Get a shingles vaccine  if you are 60 or older, even if you have had shingles before. The shingles vaccine is an injection to protect you from the varicella-zoster virus. This is the same virus that causes chickenpox.  Shingles is a painful rash that develops in people who had chickenpox or have been exposed to the virus. How to eat healthy:  My Plate is a model for planning healthy meals. It shows the types and amounts of foods that should go on your plate. Fruits and vegetables make up about half of your plate, and grains and protein make up the other half. A serving of dairy is included on the side of your plate. The amount of calories and serving sizes you need depends on your age, gender, weight, and height. Examples of healthy foods are listed below:  Eat a variety of vegetables  such as dark green, red, and orange vegetables. You can also include canned vegetables low in sodium (salt) and frozen vegetables without added butter or sauces. Eat a variety of fresh fruits , canned fruit in 100% juice, frozen fruit, and dried fruit. Include whole grains. At least half of the grains you eat should be whole grains. Examples include whole-wheat bread, wheat pasta, brown rice, and whole-grain cereals such as oatmeal.    Eat a variety of protein foods such as seafood (fish and shellfish), lean meat, and poultry without skin (turkey and chicken). Examples of lean meats include pork leg, shoulder, or tenderloin, and beef round, sirloin, tenderloin, and extra lean ground beef. Other protein foods include eggs and egg substitutes, beans, peas, soy products, nuts, and seeds. Choose low-fat dairy products such as skim or 1% milk or low-fat yogurt, cheese, and cottage cheese. Limit unhealthy fats  such as butter, hard margarine, and shortening. Exercise:  Exercise at least 30 minutes per day on most days of the week. Some examples of exercise include walking, biking, dancing, and swimming. You can also fit in more physical activity by taking the stairs instead of the elevator or parking farther away from stores. Include muscle strengthening activities 2 days each week. Regular exercise provides many health benefits.  It helps you manage your weight, and decreases your risk for type 2 diabetes, heart disease, stroke, and high blood pressure. Exercise can also help improve your mood. Ask your healthcare provider about the best exercise plan for you. General health and safety guidelines:   Do not smoke. Nicotine and other chemicals in cigarettes and cigars can cause lung damage. Ask your healthcare provider for information if you currently smoke and need help to quit. E-cigarettes or smokeless tobacco still contain nicotine. Talk to your healthcare provider before you use these products. Limit alcohol. A drink of alcohol is 12 ounces of beer, 5 ounces of wine, or 1½ ounces of liquor. Lose weight, if needed. Being overweight increases your risk of certain health conditions. These include heart disease, high blood pressure, type 2 diabetes, and certain types of cancer. Protect your skin. Do not sunbathe or use tanning beds. Use sunscreen with a SPF 15 or higher. Apply sunscreen at least 15 minutes before you go outside. Reapply sunscreen every 2 hours. Wear protective clothing, hats, and sunglasses when you are outside. Drive safely. Always wear your seatbelt. Make sure everyone in your car wears a seatbelt. A seatbelt can save your life if you are in an accident. Do not use your cell phone when you are driving. This could distract you and cause an accident. Pull over if you need to make a call or send a text message. Practice safe sex. Use latex condoms if are sexually active and have more than one partner. Your healthcare provider may recommend screening tests for sexually transmitted infections (STIs). Wear helmets, lifejackets, and protective gear. Always wear a helmet when you ride a bike or motorcycle, go skiing, or play sports that could cause a head injury. Wear protective equipment when you play sports. Wear a lifejacket when you are on a boat or doing water sports.     © Copyright [employer] 2022 Information is for End User's use only and may not be sold, redistributed or otherwise used for commercial purposes. The above information is an  only. It is not intended as medical advice for individual conditions or treatments. Talk to your doctor, nurse or pharmacist before following any medical regimen to see if it is safe and effective for you.

## 2023-08-31 ENCOUNTER — ANNUAL EXAM (OUTPATIENT)
Dept: OBGYN CLINIC | Facility: CLINIC | Age: 38
End: 2023-08-31
Payer: COMMERCIAL

## 2023-08-31 VITALS
SYSTOLIC BLOOD PRESSURE: 110 MMHG | WEIGHT: 161.8 LBS | DIASTOLIC BLOOD PRESSURE: 64 MMHG | BODY MASS INDEX: 26 KG/M2 | HEIGHT: 66 IN

## 2023-08-31 DIAGNOSIS — Z01.419 ENCNTR FOR GYN EXAM (GENERAL) (ROUTINE) W/O ABN FINDINGS: Primary | ICD-10-CM

## 2023-08-31 DIAGNOSIS — Z12.31 ENCOUNTER FOR SCREENING MAMMOGRAM FOR MALIGNANT NEOPLASM OF BREAST: ICD-10-CM

## 2023-08-31 DIAGNOSIS — Z11.51 SCREENING FOR HUMAN PAPILLOMAVIRUS (HPV): ICD-10-CM

## 2023-08-31 DIAGNOSIS — Z00.00 HEALTHCARE MAINTENANCE: ICD-10-CM

## 2023-08-31 PROCEDURE — 99395 PREV VISIT EST AGE 18-39: CPT | Performed by: OBSTETRICS & GYNECOLOGY

## 2023-08-31 RX ORDER — NORETHINDRONE ACETATE AND ETHINYL ESTRADIOL 1MG-20(21)
1 KIT ORAL DAILY
Qty: 84 TABLET | Refills: 3 | Status: SHIPPED | OUTPATIENT
Start: 2023-08-31

## 2023-08-31 NOTE — PROGRESS NOTES
Aarti Dengjalen  1985      CC:  Yearly exam    S:  42 y.o. female here for yearly exam. Her cycles are generally absent on the BCP which she likes. She has seen Dr Roxana Tyler recently and continues to be without evidence of disease for her history of renal cell carcinoma. She has an ultrasound for follow up next year instead of a CT. Sexual activity: She is sexually active without pain, bleeding or dryness. Contraception: She uses BCP for contraception. Last Pap 12/18/2018 - normal/negative HPV  Last Mammo 11/23/2022 - BIRAD-1  Last colonoscopy 2/9/2022 - repeat age 37    We reviewed ASCCP guidelines for Pap testing today. Current Outpatient Medications:   •  Junel FE 1/20 1-20 MG-MCG per tablet, TAKE 1 TABLET BY MOUTH EVERY DAY, Disp: 84 tablet, Rfl: 3  •  multivitamin (THERAGRAN) TABS, Take 1 tablet by mouth daily, Disp: , Rfl:   •  pantoprazole (PROTONIX) 20 mg tablet, TAKE 1 TABLET (20 MG TOTAL) BY MOUTH DAILY BEFORE BREAKFAST AS DIRECTED BY GI SPECIALIST, Disp: 90 tablet, Rfl: 1  Social History     Socioeconomic History   • Marital status: /Civil Union     Spouse name: Not on file   • Number of children: 1   • Years of education: Not on file   • Highest education level: Not on file   Occupational History   • 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 [place]    Tobacco Use   • Smoking status: Never   • Smokeless tobacco: Never   Vaping Use   • Vaping Use: Never used   Substance and Sexual Activity   • Alcohol use:  Yes     Alcohol/week: 1.0 standard drink of alcohol     Types: 1 Shots of liquor per week     Comment: socially   • Drug use: No   • Sexual activity: Yes     Partners: Male     Birth control/protection: Other     Comment: Pill   Other Topics Concern   • Not on file   Social History Narrative    Always wears seat belt     Daily caffeinated coffee consumption     Tea      Social Determinants of Health     Financial Resource Strain: Not on file   Food Insecurity: Not on file Transportation Needs: Not on file   Physical Activity: Not on file   Stress: Not on file   Social Connections: Not on file   Intimate Partner Violence: Not on file   Housing Stability: Not on file     Family History   Problem Relation Age of Onset   • Varicose Veins Mother    • Cirrhosis Mother    • Alcohol abuse Mother    • Hyperlipidemia Father    • Arthritis Maternal Grandmother    • Hypertension Maternal Grandfather    • Lung cancer Paternal Grandfather    • Cancer Paternal Grandfather         Lung cancer   • Breast cancer Maternal Aunt 40   • Cancer Maternal Aunt         Breast cancer   • Substance Abuse Neg Hx    • Mental illness Neg Hx    • Depression Neg Hx       Past Medical History:   Diagnosis Date   • Anemia    • Female infertility     IVF pregnacny 2017, IVF 2014   • Gestational hypertension, third trimester     resolved: May 12, 2015   • Infertility, female    • Migraine     Resolved: Nov 3, 2015   • Ovarian cyst     removed 2005, 6lb cyst   • Preeclampsia    • Pregnancy resulting from in vitro fertilization in third trimester 04/06/2018   • Renal cancer ([address]) 2/21/22    Mass removed   • Seasonal allergies    • Type AB blood, Rh positive    • Varicella     had as child around 8years old    • Visual impairment     eywear         Review of Systems   Respiratory: Negative. Cardiovascular: Negative. Gastrointestinal: Negative for constipation and diarrhea. Genitourinary: Negative for difficulty urinating, pelvic pain, vaginal bleeding, vaginal discharge, itching or odor. O:  Blood pressure 110/64, height 5' 6" (1.676 m), weight 73.4 kg (161 lb 12.8 oz), last menstrual period 08/19/2023, not currently breastfeeding. Patient appears well and is not in distress  Neck is supple without masses  Breasts are symmetrical without mass, tenderness, nipple discharge, skin changes or adenopathy. Abdomen is soft and nontender without masses.    External genitals are normal without lesions or rashes. Urethral meatus and urethra are normal  Bladder is normal to palpation  Vagina is normal without discharge or bleeding. Cervix is normal without discharge or lesion. Uterus is normal, mobile, nontender without palpable mass. Adnexa are normal, nontender, without palpable mass. A:   Yearly exam.     P:   Pap with HPV done - will call with results   Mammo slip provided    BCP refills sent    RTO one year for yearly exam or sooner as needed.

## 2023-09-07 LAB
CYTOLOGIST CVX/VAG CYTO: NORMAL
DX ICD CODE: NORMAL
HPV GENOTYPE REFLEX: NORMAL
HPV I/H RISK 4 DNA CVX QL PROBE+SIG AMP: NEGATIVE
OTHER STN SPEC: NORMAL
PATH REPORT.FINAL DX SPEC: NORMAL
SL AMB NOTE:: NORMAL
SL AMB SPECIMEN ADEQUACY: NORMAL
SL AMB TEST METHODOLOGY: NORMAL

## 2023-09-19 ENCOUNTER — OFFICE VISIT (OUTPATIENT)
Dept: URGENT CARE | Age: 38
End: 2023-09-19
Payer: COMMERCIAL

## 2023-09-19 VITALS
TEMPERATURE: 98.7 F | OXYGEN SATURATION: 100 % | DIASTOLIC BLOOD PRESSURE: 79 MMHG | WEIGHT: 164 LBS | HEART RATE: 93 BPM | SYSTOLIC BLOOD PRESSURE: 118 MMHG | HEIGHT: 67 IN | BODY MASS INDEX: 25.74 KG/M2 | RESPIRATION RATE: 16 BRPM

## 2023-09-19 DIAGNOSIS — J02.9 ACUTE VIRAL PHARYNGITIS: Primary | ICD-10-CM

## 2023-09-19 DIAGNOSIS — J02.9 SORE THROAT: ICD-10-CM

## 2023-09-19 PROCEDURE — 99213 OFFICE O/P EST LOW 20 MIN: CPT | Performed by: PHYSICIAN ASSISTANT

## 2023-09-19 PROCEDURE — 87070 CULTURE OTHR SPECIMN AEROBIC: CPT | Performed by: PHYSICIAN ASSISTANT

## 2023-09-19 NOTE — PROGRESS NOTES
[facility]        NAME: Sherrian Brittle is a 42 y.o. female  : 1985    MRN: [id number]  DATE: 2023  TIME: 7:05 PM    Assessment and Plan   Acute viral pharyngitis [J02.9]  1. Acute viral pharyngitis        2. Sore throat  Throat culture            Patient Instructions   Rapid strep in office negative. Will send for culture and contact patient if results come back positive. Increase fluids and rest.  Tylenol/Ibuprofen for pain/fever  Salt water gargles and chloraseptic spray  Throat Coat Tea  Follow up with PCP if symptoms do not improve or worsen  Report to the ER with difficulty swallowing or fever uncontrolled with tylenol and ibuprofen   Follow up with PCP in 3-5 days. Proceed to  ER if symptoms worsen. Offered patient covid swab as sore throat is a symptom however patient declined     Chief Complaint     Chief Complaint   Patient presents with   • Sore Throat     Started yesterday, symptoms increased over night. Reports right ear pain and right side gland . Pain with swallowing. Taking throat lozenge and tea which is not helping. History of Present Illness       HPI  This is a 20-year-old female who presents with a sore throat since yesterday. She notes in the middle the night she woke up with right ear pain. She notes occasional chills. She has been using throat lozenges and tea with honey for her symptoms. She denies nasal congestion, cough, fever, nausea, vomiting, diarrhea, shortness of breath or chest pain. Review of Systems   Review of Systems   Constitutional: Negative for chills and fever. HENT: Positive for ear pain and sore throat. Negative for congestion, drooling, ear discharge and trouble swallowing. Respiratory: Negative for cough, shortness of breath and stridor. Cardiovascular: Negative for chest pain. Gastrointestinal: Negative for abdominal pain, diarrhea, nausea and vomiting. Musculoskeletal: Negative for neck pain.    Neurological: Negative for headaches. Current Medications       Current Outpatient Medications:   •  multivitamin (THERAGRAN) TABS, Take 1 tablet by mouth daily, Disp: , Rfl:   •  norethindrone-ethinyl estradiol (Junel FE 1/20) 1-20 MG-MCG per tablet, Take 1 tablet by mouth daily, Disp: 84 tablet, Rfl: 3  •  pantoprazole (PROTONIX) 20 mg tablet, TAKE 1 TABLET (20 MG TOTAL) BY MOUTH DAILY BEFORE BREAKFAST AS DIRECTED BY GI SPECIALIST, Disp: 90 tablet, Rfl: 1    Current Allergies     Allergies as of 2023 - Reviewed 2023   Allergen Reaction Noted   • Neosporin [neomycin-bacitracin zn-polymyx] Hives 2014   • Other Rash 2022   • Pollen extract Allergic Rhinitis 2014            The following portions of the patient's history were reviewed and updated as appropriate: allergies, current medications, past family history, past medical history, past social history, past surgical history and problem list.     Past Medical History:   Diagnosis Date   • Anemia    • Female infertility     IVF pregnacny , IVF    • Gestational hypertension, third trimester     resolved:  May 12, 2015   • Infertility, female    • Migraine     Resolved: Nov 3, 2015   • Ovarian cyst     removed , 6lb cyst   • Preeclampsia    • Pregnancy resulting from in vitro fertilization in third trimester 2018   • Renal cancer ([address]) 20    Mass removed   • Seasonal allergies    • Type AB blood, Rh positive    • Varicella     had as child around 8years old    • Visual impairment     eywear        Past Surgical History:   Procedure Laterality Date   •  SECTION, LOW TRANSVERSE  2015    2015 daughter   • [address]   • OOPHORECTOMY Left     unilateral - Removal of one ovary    • OVARIAN CYST REMOVAL Left    • ID  DELIVERY ONLY N/A 2018    Procedure:  SECTION () REPEAT;  Surgeon: Riley Rosenthal MD;  Location: [facility];  Service: Obstetrics   • ID LAPAROSCOPY SURG PARTIAL NEPHRECTOMY Right 2/21/2022    Procedure: RIGHT ROBOTIC LAPAROSCOPIC PARTIAL NEPHRECTOMY;  Surgeon: Jerardo Delatorre MD;  Location: [facility];  Service: Urology       Family History   Problem Relation Age of Onset   • Varicose Veins Mother    • Cirrhosis Mother    • Alcohol abuse Mother    • Hyperlipidemia Father    • Arthritis Maternal Grandmother    • Hypertension Maternal Grandfather    • Lung cancer Paternal Grandfather    • Cancer Paternal Grandfather         Lung cancer   • Breast cancer Maternal Aunt 40   • Cancer Maternal Aunt         Breast cancer   • Substance Abuse Neg Hx    • Mental illness Neg Hx    • Depression Neg Hx          Medications have been verified. Objective   /79   Pulse 93   Temp 98.7 °F (37.1 °C) (Temporal)   Resp 16   Ht 5' 7" (1.702 m)   Wt 74.4 kg (164 lb)   LMP 08/19/2023 (Exact Date)   SpO2 100%   BMI 25.69 kg/m²        Physical Exam     Physical Exam  Vitals and nursing note reviewed. Constitutional:       General: She is not in acute distress. Appearance: She is normal weight. She is not toxic-appearing. HENT:      Right Ear: Tympanic membrane and ear canal normal.      Left Ear: Tympanic membrane and ear canal normal.      Nose: No congestion. Mouth/Throat:      Mouth: Mucous membranes are moist.      Pharynx: Posterior oropharyngeal erythema present. No oropharyngeal exudate. Tonsils: No tonsillar exudate or tonsillar abscesses. 1+ on the right. 1+ on the left. Cardiovascular:      Rate and Rhythm: Normal rate and regular rhythm. Pulmonary:      Effort: Pulmonary effort is normal.      Breath sounds: Normal breath sounds. Neurological:      Mental Status: She is alert.

## 2023-09-21 LAB — BACTERIA THROAT CULT: NORMAL

## 2023-11-02 DIAGNOSIS — R10.816 EPIGASTRIC ABDOMINAL TENDERNESS, REBOUND TENDERNESS PRESENCE NOT SPECIFIED: ICD-10-CM

## 2023-11-02 RX ORDER — PANTOPRAZOLE SODIUM 20 MG/1
20 TABLET, DELAYED RELEASE ORAL
Qty: 90 TABLET | Refills: 1 | Status: SHIPPED | OUTPATIENT
Start: 2023-11-02

## 2023-11-18 PROBLEM — J02.9 ACUTE VIRAL PHARYNGITIS: Status: RESOLVED | Noted: 2023-09-19 | Resolved: 2023-11-18

## 2023-11-28 ENCOUNTER — HOSPITAL ENCOUNTER (OUTPATIENT)
Dept: RADIOLOGY | Age: 38
Discharge: HOME | End: 2023-11-28
Payer: COMMERCIAL

## 2023-11-28 DIAGNOSIS — Z12.31 ENCOUNTER FOR SCREENING MAMMOGRAM FOR MALIGNANT NEOPLASM OF BREAST: ICD-10-CM

## 2023-11-28 PROCEDURE — 77067 SCR MAMMO BI INCL CAD: CPT

## 2023-11-28 PROCEDURE — 77063 BREAST TOMOSYNTHESIS BI: CPT

## 2024-01-24 ENCOUNTER — OFFICE VISIT (OUTPATIENT)
Dept: OBGYN CLINIC | Facility: CLINIC | Age: 39
End: 2024-01-24
Payer: COMMERCIAL

## 2024-01-24 VITALS — BODY MASS INDEX: 24.75 KG/M2 | DIASTOLIC BLOOD PRESSURE: 68 MMHG | WEIGHT: 158 LBS | SYSTOLIC BLOOD PRESSURE: 110 MMHG

## 2024-01-24 DIAGNOSIS — R10.2 PELVIC PAIN: Primary | ICD-10-CM

## 2024-01-24 DIAGNOSIS — Z32.02 NEGATIVE PREGNANCY TEST: ICD-10-CM

## 2024-01-24 LAB — SL AMB POCT URINE HCG: NORMAL

## 2024-01-24 PROCEDURE — 81025 URINE PREGNANCY TEST: CPT | Performed by: PHYSICIAN ASSISTANT

## 2024-01-24 PROCEDURE — 99213 OFFICE O/P EST LOW 20 MIN: CPT | Performed by: PHYSICIAN ASSISTANT

## 2024-01-24 NOTE — PROGRESS NOTES
"Assessment/Plan:    1. Pelvic pain  - Exam unremarkable today. Tylenol for cramping. Check TVUS to assess for structural cause given pt hx of left oophorectomy and cystectomy  - US pelvis complete w transvaginal; Future  - POCT urine HCG    2. Negative pregnancy test  - POCT urine HCG    Subjective:      Patient ID: Melanie Soto is a 38 y.o. female.    Pt presents to the office today for a problem visit. She has a hx of left oophorectomy and cystectomy in her early 20s. Reports having a \"6 lb ovarian cyst\" on left ovary. She also had a hx of renal cell carcinoma and follows regularly with Dr. Wallace.     She presents to the office today to discuss pelvic cramping which has been present for the past 2-3 weeks. Describes pain as lower abdominal cramping and discomfort similar to menstrual cramps. She is taking Loestrin 1/20 and does not typically have a menses on this option. Will occasionally having spotting on placebo pills every 6 months or so.     Describes aching and pain today as feel similar to the discomfort she felt at the time of the diagnosis of left ovarian cyst in her 20s. Denies severe abdominal pain, pressure, fever, chills, change in bowel habits, dysuria, urgency, frequency, hematuria, vaginal discharge, odor.    Urine POCT hcg in office- negative        The following portions of the patient's history were reviewed and updated as appropriate: allergies, current medications, past family history, past medical history, past social history, past surgical history, and problem list.    Review of Systems      Objective:      /68 (BP Location: Left arm, Patient Position: Sitting, Cuff Size: Standard)   Wt 71.7 kg (158 lb)   BMI 24.75 kg/m²          Physical Exam  Vitals reviewed.   Constitutional:       Appearance: Normal appearance.   HENT:      Head: Normocephalic and atraumatic.   Pulmonary:      Effort: Pulmonary effort is normal.   Abdominal:      General: Abdomen is flat. There is no " distension.      Palpations: There is no mass.      Tenderness: There is no abdominal tenderness. There is no guarding.   Genitourinary:     General: Normal vulva.      Labia:         Right: No rash, tenderness or lesion.         Left: No rash, tenderness or lesion.       Vagina: No vaginal discharge, tenderness, bleeding or prolapsed vaginal walls.      Cervix: No cervical motion tenderness, discharge, lesion or cervical bleeding.      Uterus: Not enlarged and not tender.       Adnexa:         Right: No mass, tenderness or fullness.          Left: No mass, tenderness or fullness.        Comments: No evidence of adnexal masses or tenderness on exam  Lymphadenopathy:      Lower Body: No right inguinal adenopathy. No left inguinal adenopathy.   Neurological:      Mental Status: She is alert.   Psychiatric:         Mood and Affect: Mood normal.         Behavior: Behavior normal.         Thought Content: Thought content normal.         Judgment: Judgment normal.

## 2024-01-29 ENCOUNTER — HOSPITAL ENCOUNTER (OUTPATIENT)
Dept: ULTRASOUND IMAGING | Facility: HOSPITAL | Age: 39
Discharge: HOME | End: 2024-01-29
Payer: COMMERCIAL

## 2024-01-29 DIAGNOSIS — R10.2 PELVIC PAIN: ICD-10-CM

## 2024-01-29 PROCEDURE — 76856 US EXAM PELVIC COMPLETE: CPT

## 2024-01-29 PROCEDURE — 76830 TRANSVAGINAL US NON-OB: CPT

## 2024-04-17 ENCOUNTER — HOSPITAL ENCOUNTER (OUTPATIENT)
Dept: ULTRASOUND IMAGING | Facility: HOSPITAL | Age: 39
Discharge: HOME | End: 2024-04-17
Attending: UROLOGY
Payer: COMMERCIAL

## 2024-04-17 DIAGNOSIS — C64.1 RENAL CELL CARCINOMA OF RIGHT KIDNEY (HCC): ICD-10-CM

## 2024-04-17 PROCEDURE — 76775 US EXAM ABDO BACK WALL LIM: CPT

## 2024-04-26 DIAGNOSIS — R10.816 EPIGASTRIC ABDOMINAL TENDERNESS, REBOUND TENDERNESS PRESENCE NOT SPECIFIED: ICD-10-CM

## 2024-04-27 RX ORDER — PANTOPRAZOLE SODIUM 20 MG/1
20 TABLET, DELAYED RELEASE ORAL
Qty: 90 TABLET | Refills: 1 | Status: SHIPPED | OUTPATIENT
Start: 2024-04-27

## 2024-05-31 DIAGNOSIS — Z00.00 HEALTHCARE MAINTENANCE: ICD-10-CM

## 2024-06-02 RX ORDER — NORETHINDRONE ACETATE AND ETHINYL ESTRADIOL AND FERROUS FUMARATE 1MG-20(21)
1 KIT ORAL DAILY
Qty: 84 TABLET | Refills: 1 | Status: SHIPPED | OUTPATIENT
Start: 2024-06-02

## 2024-06-13 ENCOUNTER — TELEPHONE (OUTPATIENT)
Age: 39
End: 2024-06-13

## 2024-06-13 NOTE — TELEPHONE ENCOUNTER
Pt calling to schedule 1 yr f/u with Dr Wallace for surveillance of renal cell carcinoma of right kidney. No f/u appts available with Dr wallace, please review and advise pt when she can be seen.     Pt call back- [phone]

## 2024-06-14 ENCOUNTER — APPOINTMENT (OUTPATIENT)
Dept: LAB | Facility: CLINIC | Age: 39
End: 2024-06-14
Payer: COMMERCIAL

## 2024-06-14 DIAGNOSIS — C64.1 RENAL CELL CARCINOMA OF RIGHT KIDNEY (HCC): ICD-10-CM

## 2024-06-14 DIAGNOSIS — Z13.220 ENCOUNTER FOR LIPID SCREENING FOR CARDIOVASCULAR DISEASE: ICD-10-CM

## 2024-06-14 DIAGNOSIS — Z13.6 ENCOUNTER FOR LIPID SCREENING FOR CARDIOVASCULAR DISEASE: ICD-10-CM

## 2024-06-14 LAB
ANION GAP SERPL CALCULATED.3IONS-SCNC: 6 MMOL/L (ref 4–13)
BASOPHILS # BLD AUTO: 0.03 THOUSANDS/ÂΜL (ref 0–0.1)
BASOPHILS NFR BLD AUTO: 0 % (ref 0–1)
BUN SERPL-MCNC: 14 MG/DL (ref 5–25)
CALCIUM SERPL-MCNC: 9.1 MG/DL (ref 8.4–10.2)
CHLORIDE SERPL-SCNC: 103 MMOL/L (ref 96–108)
CHOLEST SERPL-MCNC: 186 MG/DL
CO2 SERPL-SCNC: 30 MMOL/L (ref 21–32)
CREAT SERPL-MCNC: 0.81 MG/DL (ref 0.6–1.3)
EOSINOPHIL # BLD AUTO: 0.12 THOUSAND/ÂΜL (ref 0–0.61)
EOSINOPHIL NFR BLD AUTO: 2 % (ref 0–6)
ERYTHROCYTE [DISTWIDTH] IN BLOOD BY AUTOMATED COUNT: 13.5 % (ref 11.6–15.1)
GFR SERPL CREATININE-BSD FRML MDRD: 91 ML/MIN/1.73SQ M
GLUCOSE P FAST SERPL-MCNC: 76 MG/DL (ref 65–99)
HCT VFR BLD AUTO: 38 % (ref 34.8–46.1)
HDLC SERPL-MCNC: 58 MG/DL
HGB BLD-MCNC: 11.9 G/DL (ref 11.5–15.4)
IMM GRANULOCYTES # BLD AUTO: 0.03 THOUSAND/UL (ref 0–0.2)
IMM GRANULOCYTES NFR BLD AUTO: 0 % (ref 0–2)
LDLC SERPL CALC-MCNC: 111 MG/DL (ref 0–100)
LYMPHOCYTES # BLD AUTO: 2.53 THOUSANDS/ÂΜL (ref 0.6–4.47)
LYMPHOCYTES NFR BLD AUTO: 35 % (ref 14–44)
MCH RBC QN AUTO: 28.8 PG (ref 26.8–34.3)
MCHC RBC AUTO-ENTMCNC: 31.3 G/DL (ref 31.4–37.4)
MCV RBC AUTO: 92 FL (ref 82–98)
MONOCYTES # BLD AUTO: 0.33 THOUSAND/ÂΜL (ref 0.17–1.22)
MONOCYTES NFR BLD AUTO: 5 % (ref 4–12)
NEUTROPHILS # BLD AUTO: 4.18 THOUSANDS/ÂΜL (ref 1.85–7.62)
NEUTS SEG NFR BLD AUTO: 58 % (ref 43–75)
NRBC BLD AUTO-RTO: 0 /100 WBCS
PLATELET # BLD AUTO: 202 THOUSANDS/UL (ref 149–390)
PMV BLD AUTO: 11 FL (ref 8.9–12.7)
POTASSIUM SERPL-SCNC: 3.8 MMOL/L (ref 3.5–5.3)
RBC # BLD AUTO: 4.13 MILLION/UL (ref 3.81–5.12)
SODIUM SERPL-SCNC: 139 MMOL/L (ref 135–147)
TRIGL SERPL-MCNC: 84 MG/DL
WBC # BLD AUTO: 7.22 THOUSAND/UL (ref 4.31–10.16)

## 2024-06-14 PROCEDURE — 80061 LIPID PANEL: CPT

## 2024-06-14 PROCEDURE — 85025 COMPLETE CBC W/AUTO DIFF WBC: CPT

## 2024-06-14 PROCEDURE — 80048 BASIC METABOLIC PNL TOTAL CA: CPT

## 2024-06-14 PROCEDURE — 36415 COLL VENOUS BLD VENIPUNCTURE: CPT

## 2024-06-27 NOTE — PROGRESS NOTES
Ambulatory Visit  Name: Melanie Soto      : 1985      MRN: [id number]  Encounter Provider: Elias Wallace MD  Encounter Date: 2024   Encounter department: [facility] UROLOGY [place]        Assessment & Plan   1. Renal cell carcinoma of right kidney (HCC)  Assessment & Plan:  RCC s/p partial, KYREE, ECOG 0, follow up 1 year with renal u/s prior, can get a CT Scan abd the following year vs MRI at that time  Orders:  -     US kidney and bladder with pvr; Future; Expected date: 2025  2. Encounter for follow-up surveillance of kidney cancer  3. History of partial nephrectomy      History of Present Illness     Melanie Soto is a 38 y.o. 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    ECOG is 0    Renal u/s negative for recurrence    The following portions of the patient's history were reviewed and updated as appropriate: allergies, current medications, past family history, past medical history, past social history, past surgical history and problem list.      Review of Systems   Constitutional: Negative.    HENT: Negative.     Eyes: Negative.    Respiratory: Negative.     Cardiovascular: Negative.    Gastrointestinal:  Positive for abdominal pain (occasional at the anterior abdominal wall).   Endocrine: Negative.    Genitourinary: Negative.    Musculoskeletal: Negative.    Skin: Negative.    Allergic/Immunologic: Negative.    Neurological: Negative.    Hematological: Negative.    Psychiatric/Behavioral: Negative.         Current Outpatient Medications on File Prior to Visit   Medication Sig Dispense Refill    Aurovela FE  1-20 MG-MCG per tablet TAKE 1 TABLET BY MOUTH EVERY DAY 84 tablet 1    multivitamin (THERAGRAN) TABS Take 1 tablet by mouth daily      pantoprazole (PROTONIX) 20 mg tablet TAKE 1 TABLET (20 MG TOTAL) BY MOUTH DAILY BEFORE BREAKFAST AS DIRECTED BY GI SPECIALIST 90 tablet 1     No  "current facility-administered medications on file prior to visit.        Objective     /66 (BP Location: Left arm, Patient Position: Sitting, Cuff Size: Standard)   Pulse 81   Ht 5' 7\" (1.702 m)   Wt 75.8 kg (167 lb)   SpO2 99%   BMI 26.16 kg/m²   Physical Exam  Vitals reviewed.   Constitutional:       General: She is not in acute distress.     Appearance: Normal appearance. She is well-developed. She is not ill-appearing, toxic-appearing or diaphoretic.   HENT:      Head: Normocephalic and atraumatic.      Nose: Nose normal.   Eyes:      General: No scleral icterus.        Right eye: No discharge.         Left eye: No discharge.      Pupils: Pupils are equal, round, and reactive to light.   Neck:      Thyroid: No thyromegaly.      Trachea: No tracheal deviation.   Cardiovascular:      Rate and Rhythm: Normal rate and regular rhythm.   Pulmonary:      Effort: Pulmonary effort is normal. No respiratory distress.   Abdominal:      General: There is no distension.      Palpations: There is no mass.      Tenderness: There is no abdominal tenderness.      Hernia: No hernia is present.   Musculoskeletal:         General: No deformity or signs of injury.      Cervical back: Normal range of motion and neck supple.   Lymphadenopathy:      Cervical: No cervical adenopathy.   Skin:     Coloration: Skin is not jaundiced or pale.      Findings: No erythema or rash.   Neurological:      Mental Status: She is alert and oriented to person, place, and time.      Cranial Nerves: No cranial nerve deficit.      Sensory: No sensory deficit.      Motor: No abnormal muscle tone.      Coordination: Coordination normal.   Psychiatric:         Mood and Affect: Mood normal.         Behavior: Behavior normal.         Thought Content: Thought content normal.         Judgment: Judgment normal.       Results    Lab Results   Component Value Date    GLUCOSE 87 04/27/2015    CALCIUM 9.1 06/14/2024     08/27/2016    K 3.8 06/14/2024 "    CO2 30 06/14/2024     06/14/2024    BUN 14 06/14/2024    CREATININE 0.81 06/14/2024     Lab Results   Component Value Date    WBC 7.22 06/14/2024    HGB 11.9 06/14/2024    HCT 38.0 06/14/2024    MCV 92 06/14/2024     06/14/2024

## 2024-06-28 ENCOUNTER — OFFICE VISIT (OUTPATIENT)
Dept: UROLOGY | Facility: CLINIC | Age: 39
End: 2024-06-28
Payer: COMMERCIAL

## 2024-06-28 VITALS
SYSTOLIC BLOOD PRESSURE: 114 MMHG | DIASTOLIC BLOOD PRESSURE: 66 MMHG | HEIGHT: 67 IN | HEART RATE: 81 BPM | BODY MASS INDEX: 26.21 KG/M2 | WEIGHT: 167 LBS | OXYGEN SATURATION: 99 %

## 2024-06-28 DIAGNOSIS — C64.1 RENAL CELL CARCINOMA OF RIGHT KIDNEY (HCC): Primary | ICD-10-CM

## 2024-06-28 DIAGNOSIS — Z08 ENCOUNTER FOR FOLLOW-UP SURVEILLANCE OF KIDNEY CANCER: ICD-10-CM

## 2024-06-28 DIAGNOSIS — Z90.5 HISTORY OF PARTIAL NEPHRECTOMY: ICD-10-CM

## 2024-06-28 DIAGNOSIS — Z85.528 ENCOUNTER FOR FOLLOW-UP SURVEILLANCE OF KIDNEY CANCER: ICD-10-CM

## 2024-06-28 PROCEDURE — 99213 OFFICE O/P EST LOW 20 MIN: CPT | Performed by: UROLOGY

## 2024-06-28 NOTE — ASSESSMENT & PLAN NOTE
RCC s/p partial, KYREE, ECOG 0, follow up 1 year with renal u/s prior, can get a CT Scan abd the following year vs MRI at that time

## 2024-08-01 ENCOUNTER — RA CDI HCC (OUTPATIENT)
Dept: OTHER | Facility: HOSPITAL | Age: 39
End: 2024-08-01

## 2024-08-01 NOTE — PROGRESS NOTES
HCC coding opportunities       Chart reviewed, no opportunity found: CHART REVIEWED, NO OPPORTUNITY FOUND        Patients Insurance        Commercial Insurance: [employer]

## 2024-08-08 ENCOUNTER — OFFICE VISIT (OUTPATIENT)
Dept: INTERNAL MEDICINE CLINIC | Facility: CLINIC | Age: 39
End: 2024-08-08
Payer: COMMERCIAL

## 2024-08-08 VITALS
WEIGHT: 169 LBS | DIASTOLIC BLOOD PRESSURE: 76 MMHG | TEMPERATURE: 98 F | SYSTOLIC BLOOD PRESSURE: 122 MMHG | BODY MASS INDEX: 26.47 KG/M2 | OXYGEN SATURATION: 98 % | HEART RATE: 88 BPM

## 2024-08-08 DIAGNOSIS — Z00.00 WELLNESS EXAMINATION: Primary | ICD-10-CM

## 2024-08-08 DIAGNOSIS — Z85.528 HISTORY OF KIDNEY CANCER: ICD-10-CM

## 2024-08-08 PROCEDURE — 99396 PREV VISIT EST AGE 40-64: CPT | Performed by: INTERNAL MEDICINE

## 2024-08-08 NOTE — PATIENT INSTRUCTIONS
"Pantoprazole 5 times a week and try pepcid instead    Patient Education     Routine physical for adults   The Basics   Written by the doctors and editors at [facility]   What is a physical? -- A physical is a routine visit, or \"check-up,\" with your doctor. You might also hear it called a \"wellness visit\" or \"preventive visit.\"  During each visit, the doctor will:   Ask about your physical and mental health   Ask about your habits, behaviors, and lifestyle   Do an exam   Give you vaccines if needed   Talk to you about any medicines you take   Give advice about your health   Answer your questions  Getting regular check-ups is an important part of taking care of your health. It can help your doctor find and treat any problems you have. But it's also important for preventing health problems.  A routine physical is different from a \"sick visit.\" A sick visit is when you see a doctor because of a health concern or problem. Since physicals are scheduled ahead of time, you can think about what you want to ask the doctor.  How often should I get a physical? -- It depends on your age and health. In general, for people age 21 years and older:   If you are younger than 50 years, you might be able to get a physical every 3 years.   If you are 50 years or older, your doctor might recommend a physical every year.  If you have an ongoing health condition, like diabetes or high blood pressure, your doctor will probably want to see you more often.  What happens during a physical? -- In general, each visit will include:   Physical exam - The doctor or nurse will check your height, weight, heart rate, and blood pressure. They will also look at your eyes and ears. They will ask about how you are feeling and whether you have any symptoms that bother you.   Medicines - It's a good idea to bring a list of all the medicines you take to each doctor visit. Your doctor will talk to you about your medicines and answer any questions. Tell them if " "you are having any side effects that bother you. You should also tell them if you are having trouble paying for any of your medicines.   Habits and behaviors - This includes:   Your diet   Your exercise habits   Whether you smoke, drink alcohol, or use drugs   Whether you are sexually active   Whether you feel safe at home  Your doctor will talk to you about things you can do to improve your health and lower your risk of health problems. They will also offer help and support. For example, if you want to quit smoking, they can give you advice and might prescribe medicines. If you want to improve your diet or get more physical activity, they can help you with this, too.   Lab tests, if needed - The tests you get will depend on your age and situation. For example, your doctor might want to check your:   Cholesterol   Blood sugar   Iron level   Vaccines - The recommended vaccines will depend on your age, health, and what vaccines you already had. Vaccines are very important because they can prevent certain serious or deadly infections.   Discussion of screening - \"Screening\" means checking for diseases or other health problems before they cause symptoms. Your doctor can recommend screening based on your age, risk, and preferences. This might include tests to check for:   Cancer, such as breast, prostate, cervical, ovarian, colorectal, prostate, lung, or skin cancer   Sexually transmitted infections, such as chlamydia and gonorrhea   Mental health conditions like depression and anxiety  Your doctor will talk to you about the different types of screening tests. They can help you decide which screenings to have. They can also explain what the results might mean.   Answering questions - The physical is a good time to ask the doctor or nurse questions about your health. If needed, they can refer you to other doctors or specialists, too.  Adults older than 65 years often need other care, too. As you get older, your doctor " will talk to you about:   How to prevent falling at home   Hearing or vision tests   Memory testing   How to take your medicines safely   Making sure that you have the help and support you need at home  All topics are updated as new evidence becomes available and our peer review process is complete.  This topic retrieved from [employer] on: May 02, 2024.  Topic 923817 Version 1.0  Release: 32.4.3 - C32.122  © 2024 UpToDate, Inc. and/or its affiliates. All rights reserved.  Consumer Information Use and Disclaimer   Disclaimer: This generalized information is a limited summary of diagnosis, treatment, and/or medication information. It is not meant to be comprehensive and should be used as a tool to help the user understand and/or assess potential diagnostic and treatment options. It does NOT include all information about conditions, treatments, medications, side effects, or risks that may apply to a specific patient. It is not intended to be medical advice or a substitute for the medical advice, diagnosis, or treatment of a health care provider based on the health care provider's examination and assessment of a patient's specific and unique circumstances. Patients must speak with a health care provider for complete information about their health, medical questions, and treatment options, including any risks or benefits regarding use of medications. This information does not endorse any treatments or medications as safe, effective, or approved for treating a specific patient. UpToDate, Inc. and its affiliates disclaim any warranty or liability relating to this information or the use thereof.The use of this information is governed by the Terms of Use, available at https://www.wolterskluwer.com/en/know/clinical-effectiveness-terms. 2024© UpToDate, Inc. and its affiliates and/or licensors. All rights reserved.  Copyright   © 2024 UpToDate, Inc. and/or its affiliates. All rights reserved.

## 2024-08-08 NOTE — PROGRESS NOTES
Adult Annual Physical  Name: Melanie Soto      : 1985      MRN: [id number]  Encounter Provider: Yuriy Malone DO  Encounter Date: 2024   Encounter department: [facility] INTERNAL MEDICINE    Assessment & Plan   1. Wellness examination  Comments:  encouraged flu vaccine and COVID booster in the fall if traveling/large social gatherings.  2. History of kidney cancer    Immunizations and preventive care screenings were discussed with patient today. Appropriate education was printed on patient's after visit summary.    Counseling:  Exercise: the importance of regular exercise/physical activity was discussed. Recommend exercise 3-5 times per week for at least 30 minutes.       Depression Screening and Follow-up Plan: Patient was screened for depression during today's encounter. They screened negative with a PHQ-2 score of 0.        History of Present Illness     Adult Annual Physical:  Patient presents for annual physical.     Diet and Physical Activity:  - Diet/Nutrition: well balanced diet.  - Exercise: walking.    Depression Screening:  - PHQ-2 Score: 0    General Health:  - Sleep: sleeps well.  - Hearing: normal hearing bilateral ears.  - Vision: wears contacts.  - Dental: regular dental visits.    /GYN Health:  - Follows with GYN: yes.   - Menopause: premenopausal.   - Contraception: oral contraceptives.      Here for annual physical, Melanie is overall doing well.  She is taking PPI on a regular basis.  She takes pepcid in the evening at times but not lately.  Her acid reflux symptoms have been stable.  She continues to see urology to f/u on kidney cancer s/p surgery in .  She has some stressors and anxiety at times but is coping/managing.  ROS Otherwise negative, no other complaints.    Review of Systems   Constitutional:  Negative for fever.   HENT:  Negative for congestion.    Eyes:  Negative for visual disturbance.   Respiratory:  Negative for shortness of breath.     Cardiovascular:  Negative for chest pain.   Gastrointestinal:  Negative for abdominal pain.   Endocrine: Negative for polyuria.   Genitourinary:  Negative for difficulty urinating.   Musculoskeletal:  Negative for gait problem.   Skin:  Negative for rash.   Allergic/Immunologic: Negative for immunocompromised state.   Neurological:  Negative for dizziness.   Psychiatric/Behavioral:  Negative for dysphoric mood.      Current Outpatient Medications on File Prior to Visit   Medication Sig Dispense Refill    Aurovela FE 1/20 1-20 MG-MCG per tablet TAKE 1 TABLET BY MOUTH EVERY DAY 84 tablet 1    multivitamin (THERAGRAN) TABS Take 1 tablet by mouth daily      pantoprazole (PROTONIX) 20 mg tablet TAKE 1 TABLET (20 MG TOTAL) BY MOUTH DAILY BEFORE BREAKFAST AS DIRECTED BY GI SPECIALIST 90 tablet 1     No current facility-administered medications on file prior to visit.        Objective     /76 (BP Location: Left arm, Patient Position: Sitting, Cuff Size: Standard)   Pulse 88   Temp 98 °F (36.7 °C)   Wt 76.7 kg (169 lb)   SpO2 98%   BMI 26.47 kg/m²     Physical Exam  Vitals reviewed.   Constitutional:       General: She is not in acute distress.     Appearance: Normal appearance.   HENT:      Head: Normocephalic and atraumatic.      Right Ear: Tympanic membrane normal.      Left Ear: Tympanic membrane normal.      Mouth/Throat:      Mouth: Mucous membranes are moist.   Eyes:      Conjunctiva/sclera: Conjunctivae normal.   Cardiovascular:      Rate and Rhythm: Normal rate and regular rhythm.      Heart sounds: No murmur heard.  Pulmonary:      Effort: Pulmonary effort is normal.      Breath sounds: No wheezing or rales.   Abdominal:      General: Abdomen is flat. Bowel sounds are normal.      Palpations: Abdomen is soft.      Tenderness: There is no abdominal tenderness.   Musculoskeletal:      Cervical back: Neck supple.      Right lower leg: No edema.      Left lower leg: No edema.   Neurological:      Mental  Status: She is alert. Mental status is at baseline.   Psychiatric:         Mood and Affect: Mood normal.         Behavior: Behavior normal.       Results for orders placed or performed in visit on 06/14/24   CBC and differential   Result Value Ref Range    WBC 7.22 4.31 - 10.16 Thousand/uL    RBC 4.13 3.81 - 5.12 Million/uL    Hemoglobin 11.9 11.5 - 15.4 g/dL    Hematocrit 38.0 34.8 - 46.1 %    MCV 92 82 - 98 fL    MCH 28.8 26.8 - 34.3 pg    MCHC 31.3 (L) 31.4 - 37.4 g/dL    RDW 13.5 11.6 - 15.1 %    MPV 11.0 8.9 - 12.7 fL    Platelets 202 149 - 390 Thousands/uL    nRBC 0 /100 WBCs    Segmented % 58 43 - 75 %    Immature Grans % 0 0 - 2 %    Lymphocytes % 35 14 - 44 %    Monocytes % 5 4 - 12 %    Eosinophils Relative 2 0 - 6 %    Basophils Relative 0 0 - 1 %    Absolute Neutrophils 4.18 1.85 - 7.62 Thousands/µL    Absolute Immature Grans 0.03 0.00 - 0.20 Thousand/uL    Absolute Lymphocytes 2.53 0.60 - 4.47 Thousands/µL    Absolute Monocytes 0.33 0.17 - 1.22 Thousand/µL    Eosinophils Absolute 0.12 0.00 - 0.61 Thousand/µL    Basophils Absolute 0.03 0.00 - 0.10 Thousands/µL   Basic metabolic panel   Result Value Ref Range    Sodium 139 135 - 147 mmol/L    Potassium 3.8 3.5 - 5.3 mmol/L    Chloride 103 96 - 108 mmol/L    CO2 30 21 - 32 mmol/L    ANION GAP 6 4 - 13 mmol/L    BUN 14 5 - 25 mg/dL    Creatinine 0.81 0.60 - 1.30 mg/dL    Glucose, Fasting 76 65 - 99 mg/dL    Calcium 9.1 8.4 - 10.2 mg/dL    eGFR 91 ml/min/1.73sq m   Lipid Panel with Direct LDL reflex   Result Value Ref Range    Cholesterol 186 See Comment mg/dL    Triglycerides 84 See Comment mg/dL    HDL, Direct 58 >=50 mg/dL    LDL Calculated 111 (H) 0 - 100 mg/dL

## 2024-10-15 ENCOUNTER — ANNUAL EXAM (OUTPATIENT)
Dept: OBGYN CLINIC | Facility: CLINIC | Age: 39
End: 2024-10-15
Payer: COMMERCIAL

## 2024-10-15 VITALS
HEIGHT: 67 IN | DIASTOLIC BLOOD PRESSURE: 74 MMHG | BODY MASS INDEX: 26.37 KG/M2 | SYSTOLIC BLOOD PRESSURE: 120 MMHG | WEIGHT: 168 LBS

## 2024-10-15 DIAGNOSIS — Z01.419 ENCOUNTER FOR GYNECOLOGICAL EXAMINATION WITHOUT ABNORMAL FINDING: Primary | ICD-10-CM

## 2024-10-15 DIAGNOSIS — Z00.00 HEALTHCARE MAINTENANCE: ICD-10-CM

## 2024-10-15 PROCEDURE — 99396 PREV VISIT EST AGE 40-64: CPT | Performed by: OBSTETRICS & GYNECOLOGY

## 2024-10-15 RX ORDER — NORETHINDRONE ACETATE AND ETHINYL ESTRADIOL 1MG-20(21)
1 KIT ORAL DAILY
Qty: 84 TABLET | Refills: 4 | Status: SHIPPED | OUTPATIENT
Start: 2024-10-15

## 2024-10-15 NOTE — PROGRESS NOTES
Melanie Soto  1985      CC:  Yearly exam    S:  39 y.o. female here for yearly exam. Her cycles are overall regular, not heavy or crampy. She did have one weird cycle and one possible cyst rupture this year.  Discussed 30 or 35 mcg BCP if this continues.      Sexual activity: She is sexually active without pain, bleeding or dryness.     Contraception: She uses BCP for contraception.     Last Pap 8/2023 - normal/negative HPV  Last Mammo 11/2023 - BIRAD-1  Colonoscopy 2/2022 - normal; 10 year recall    We reviewed ASC guidelines for Pap testing today.         Current Outpatient Medications:     Aurovela FE 1/20 1-20 MG-MCG per tablet, TAKE 1 TABLET BY MOUTH EVERY DAY, Disp: 84 tablet, Rfl: 1    multivitamin (THERAGRAN) TABS, Take 1 tablet by mouth daily, Disp: , Rfl:     pantoprazole (PROTONIX) 20 mg tablet, TAKE 1 TABLET (20 MG TOTAL) BY MOUTH DAILY BEFORE BREAKFAST AS DIRECTED BY GI SPECIALIST, Disp: 90 tablet, Rfl: 1  Social History     Socioeconomic History    Marital status: /Civil Union     Spouse name: Not on file    Number of children: 1    Years of education: Not on file    Highest education level: Not on file   Occupational History    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 [place]    Tobacco Use    Smoking status: Never    Smokeless tobacco: Never   Vaping Use    Vaping status: Never Used   Substance and Sexual Activity    Alcohol use: Yes     Alcohol/week: 1.0 standard drink of alcohol     Types: 1 Shots of liquor per week     Comment: socially    Drug use: No    Sexual activity: Yes     Partners: Male     Birth control/protection: Other     Comment: Pill   Other Topics Concern    Not on file   Social History Narrative    Always wears seat belt     Daily caffeinated coffee consumption     Tea      Social Determinants of Health     Financial Resource Strain: Not on file   Food Insecurity: Not on file   Transportation Needs: Not on file   Physical Activity: Not on file   Stress: Not on  "file   Social Connections: Not on file   Intimate Partner Violence: Not on file   Housing Stability: Not on file     Family History   Problem Relation Age of Onset    Varicose Veins Mother     Cirrhosis Mother     Alcohol abuse Mother     Hyperlipidemia Father     No Known Problems Daughter     Arthritis Maternal Grandmother     Hypertension Maternal Grandfather     Lung cancer Paternal Grandfather     Cancer Paternal Grandfather         Lung cancer    Breast cancer Maternal Aunt 40    Cancer Maternal Aunt         Breast cancer    No Known Problems Son     No Known Problems Brother     Substance Abuse Neg Hx     Mental illness Neg Hx     Depression Neg Hx       Past Medical History:   Diagnosis Date    Anemia     Female infertility     IVF pregnacny 2017, IVF 2014    Gestational hypertension, third trimester     resolved: May 12, 2015    Infertility, female     Migraine     Resolved: Nov 3, 2015    Ovarian cyst     removed 2005, 6lb cyst    Preeclampsia     Pregnancy resulting from in vitro fertilization in third trimester 04/06/2018    Renal cancer (HCC) 2/21/22    Mass removed    Seasonal allergies     Type AB blood, Rh positive     Varicella     had as child around 4 years old     Visual impairment     eywear         Review of Systems   Respiratory: Negative.    Cardiovascular: Negative.    Gastrointestinal: Negative for constipation and diarrhea.   Genitourinary: Negative for difficulty urinating, pelvic pain, vaginal bleeding, vaginal discharge, itching or odor.    O:  Blood pressure 120/74, height 5' 7\" (1.702 m), weight 76.2 kg (168 lb), last menstrual period 09/16/2024, not currently breastfeeding.    Patient appears well and is not in distress  Neck is supple without masses  Breasts are symmetrical without mass, tenderness, nipple discharge, skin changes or adenopathy.   Abdomen is soft and nontender without masses.   External genitals are normal without lesions or rashes.  Urethral meatus and urethra are " normal  Bladder is normal to palpation  Vagina is normal without discharge or bleeding.   Cervix is normal without discharge or lesion.   Uterus is normal, mobile, nontender without palpable mass.  Adnexa are normal, nontender, without palpable mass.     A:   Yearly exam.     P:   Pap due 2028   Mammo scheduled   Colonoscopy up to date   BCP refills sent    RTO one year for yearly exam or sooner as needed.

## 2024-11-05 ENCOUNTER — TELEPHONE (OUTPATIENT)
Dept: GASTROENTEROLOGY | Facility: AMBULARY SURGERY CENTER | Age: 39
End: 2024-11-05

## 2024-11-05 DIAGNOSIS — R10.816 EPIGASTRIC ABDOMINAL TENDERNESS, REBOUND TENDERNESS PRESENCE NOT SPECIFIED: ICD-10-CM

## 2024-11-05 RX ORDER — PANTOPRAZOLE SODIUM 20 MG/1
20 TABLET, DELAYED RELEASE ORAL
Qty: 90 TABLET | Refills: 1 | Status: SHIPPED | OUTPATIENT
Start: 2024-11-05

## 2024-11-05 NOTE — TELEPHONE ENCOUNTER
Called and spoke to patient to inform her that courtesy refill of Pantoprazole was sent until upcoming appointment. Patient aware and verbalized understanding. No further questions.

## 2024-11-07 PROCEDURE — 87070 CULTURE OTHR SPECIMN AEROBIC: CPT | Performed by: PHYSICIAN ASSISTANT

## 2024-11-07 PROCEDURE — 87205 SMEAR GRAM STAIN: CPT | Performed by: PHYSICIAN ASSISTANT

## 2024-11-08 ENCOUNTER — LAB REQUISITION (OUTPATIENT)
Dept: LAB | Facility: HOSPITAL | Age: 39
End: 2024-11-08
Payer: COMMERCIAL

## 2024-11-08 DIAGNOSIS — A49.01 METHICILLIN SUSCEPTIBLE STAPHYLOCOCCUS AUREUS INFECTION, UNSPECIFIED SITE: ICD-10-CM

## 2024-11-08 DIAGNOSIS — B95.8 UNSPECIFIED STAPHYLOCOCCUS AS THE CAUSE OF DISEASES CLASSIFIED ELSEWHERE: ICD-10-CM

## 2024-11-08 DIAGNOSIS — A49.9 BACTERIAL INFECTION, UNSPECIFIED: ICD-10-CM

## 2024-11-10 LAB
BACTERIA WND AEROBE CULT: NORMAL
GRAM STN SPEC: NORMAL

## 2024-11-26 ENCOUNTER — OFFICE VISIT (OUTPATIENT)
Dept: GASTROENTEROLOGY | Facility: AMBULARY SURGERY CENTER | Age: 39
End: 2024-11-26
Payer: COMMERCIAL

## 2024-11-26 VITALS
DIASTOLIC BLOOD PRESSURE: 84 MMHG | HEART RATE: 81 BPM | SYSTOLIC BLOOD PRESSURE: 120 MMHG | HEIGHT: 67 IN | WEIGHT: 168 LBS | BODY MASS INDEX: 26.37 KG/M2 | OXYGEN SATURATION: 99 %

## 2024-11-26 DIAGNOSIS — K21.9 GASTROESOPHAGEAL REFLUX DISEASE, UNSPECIFIED WHETHER ESOPHAGITIS PRESENT: Primary | ICD-10-CM

## 2024-11-26 PROCEDURE — 99213 OFFICE O/P EST LOW 20 MIN: CPT | Performed by: PHYSICIAN ASSISTANT

## 2024-11-26 RX ORDER — HALOBETASOL PROPIONATE 0.05 %
OINTMENT (GRAM) TOPICAL
COMMUNITY
Start: 2024-11-07

## 2024-11-26 RX ORDER — FAMOTIDINE 40 MG/1
40 TABLET, FILM COATED ORAL DAILY PRN
Qty: 30 TABLET | Refills: 3 | Status: SHIPPED | OUTPATIENT
Start: 2024-11-26

## 2024-11-26 NOTE — PROGRESS NOTES
Name: Melanie Soto      : 1985      MRN: [id number]  Encounter Provider: Morenita Quezada PA-C  Encounter Date: 2024   Encounter department: [facility]  :  Assessment & Plan  Gastroesophageal reflux disease, unspecified whether esophagitis present  Previous EGD in  with mild gastritis otherwise unremarkable.  She tried tapering off of Protonix completely in the past however was unsuccessful.  More recently she is only using Protonix during times of increased stress which typically flare her symptoms.  She will then stop taking it and use Pepcid as needed.    -Recommend patient trial Pepcid 40 mg as needed during times of stress to see if this controls her symptoms.  - We will try stopping Protonix completely  Can use an additional over-the-counter Pepcid 20 mg as needed for breakthrough symptoms.  - Continue to avoid known food triggers  -Advised patient reach out if she is unable to control symptoms with Pepcid above.  At that time would consider continuing low-dose Protonix.  - No indication for EGD.  Will perform in the future if development of alarm symptoms.         History of Present Illness     HPI  Melanie Soto is a 39 y.o. female who presents with history of renal cell carcinoma status post partial nephrectomy who presents to the office for follow-up for medication check.    Patient reports doing relatively well over the past year.  She did try to come off of Protonix completely in the past however had worsening reflux symptoms therefore did not try this again.  At this time she is only using this medicine as needed.  For example during times of stress such as family functions or so she will start taking this daily and this will keep her symptoms at bay.  She will then stop taking it.  She will need to use occasional breakthrough over-the-counter Pepcid.  Otherwise doing well.    Most recent lab work with normal hemoglobin.  No family history of  colon cancer.      Patient had EGD and colonoscopy 2/2022.  EGD with mild gastritis and fundic gland polyps.  Colonoscopy at that time with hemorrhoids and mild diverticulosis.  Biopsies negative for celiac, H. pylori and microscopic colitis.  Repeat colonoscopy recommended in 10 years.        Review of Systems   All other systems reviewed and are negative.         Objective   There were no vitals taken for this visit.     Physical Exam  Vitals reviewed.   Constitutional:       General: She is not in acute distress.     Appearance: Normal appearance. She is not ill-appearing.   HENT:      Head: Normocephalic and atraumatic.   Eyes:      Conjunctiva/sclera: Conjunctivae normal.   Cardiovascular:      Rate and Rhythm: Normal rate and regular rhythm.      Heart sounds: No murmur heard.  Pulmonary:      Effort: Pulmonary effort is normal. No respiratory distress.      Breath sounds: Normal breath sounds.   Abdominal:      General: Abdomen is flat. There is no distension.      Palpations: Abdomen is soft.      Tenderness: There is no abdominal tenderness. There is no guarding or rebound.   Musculoskeletal:         General: No swelling.      Cervical back: Normal range of motion.      Right lower leg: No edema.      Left lower leg: No edema.   Skin:     General: Skin is warm.      Coloration: Skin is not jaundiced.   Neurological:      General: No focal deficit present.      Mental Status: She is alert and oriented to person, place, and time. Mental status is at baseline.   Psychiatric:         Mood and Affect: Mood normal.         Behavior: Behavior normal.

## 2024-11-29 ENCOUNTER — HOSPITAL ENCOUNTER (OUTPATIENT)
Dept: RADIOLOGY | Age: 39
Discharge: HOME | End: 2024-11-29
Payer: COMMERCIAL

## 2024-11-29 DIAGNOSIS — Z12.31 ENCOUNTER FOR SCREENING MAMMOGRAM FOR MALIGNANT NEOPLASM OF BREAST: ICD-10-CM

## 2024-11-29 PROCEDURE — 77067 SCR MAMMO BI INCL CAD: CPT

## 2024-11-29 PROCEDURE — 77063 BREAST TOMOSYNTHESIS BI: CPT

## 2024-12-26 DIAGNOSIS — K21.9 GASTROESOPHAGEAL REFLUX DISEASE, UNSPECIFIED WHETHER ESOPHAGITIS PRESENT: ICD-10-CM

## 2024-12-27 RX ORDER — FAMOTIDINE 40 MG/1
TABLET, FILM COATED ORAL
Qty: 90 TABLET | Refills: 1 | Status: SHIPPED | OUTPATIENT
Start: 2024-12-27

## 2025-06-02 ENCOUNTER — HOSPITAL ENCOUNTER (OUTPATIENT)
Dept: ULTRASOUND IMAGING | Facility: HOSPITAL | Age: 40
Discharge: HOME | End: 2025-06-02
Attending: UROLOGY
Payer: COMMERCIAL

## 2025-06-02 DIAGNOSIS — C64.1 RENAL CELL CARCINOMA OF RIGHT KIDNEY (HCC): ICD-10-CM

## 2025-06-02 PROCEDURE — 76775 US EXAM ABDO BACK WALL LIM: CPT

## 2025-06-05 ENCOUNTER — TELEPHONE (OUTPATIENT)
Age: 40
End: 2025-06-05

## 2025-06-05 DIAGNOSIS — C64.1 RENAL CELL CARCINOMA OF RIGHT KIDNEY (HCC): Primary | ICD-10-CM

## 2025-06-05 NOTE — TELEPHONE ENCOUNTER
Patient of Dr. Wallace At [place]    Patient called stating she has her yearly appointment with Dr. Wallace on 07/17/25 and she is to get an u/s done and also she usually gets labs done for her yearly check up   She went to the lab but no orders are in for labs.  She wants to know if any labs need to be done for her yearly check up .  She already did the u/s kidney/bladder .  Please review and let her know .      Patient can be reached at [phone]

## 2025-06-09 ENCOUNTER — RESULTS FOLLOW-UP (OUTPATIENT)
Dept: UROLOGY | Facility: CLINIC | Age: 40
End: 2025-06-09

## 2025-07-10 ENCOUNTER — APPOINTMENT (OUTPATIENT)
Dept: LAB | Facility: CLINIC | Age: 40
End: 2025-07-10
Attending: PHYSICIAN ASSISTANT
Payer: COMMERCIAL

## 2025-07-10 DIAGNOSIS — C64.1 RENAL CELL CARCINOMA OF RIGHT KIDNEY (HCC): ICD-10-CM

## 2025-07-10 LAB
ALBUMIN SERPL BCG-MCNC: 4.2 G/DL (ref 3.5–5)
ALP SERPL-CCNC: 54 U/L (ref 34–104)
ALT SERPL W P-5'-P-CCNC: 16 U/L (ref 7–52)
ANION GAP SERPL CALCULATED.3IONS-SCNC: 4 MMOL/L (ref 4–13)
AST SERPL W P-5'-P-CCNC: 15 U/L (ref 13–39)
BILIRUB SERPL-MCNC: 0.41 MG/DL (ref 0.2–1)
BUN SERPL-MCNC: 14 MG/DL (ref 5–25)
CALCIUM SERPL-MCNC: 8.8 MG/DL (ref 8.4–10.2)
CHLORIDE SERPL-SCNC: 105 MMOL/L (ref 96–108)
CO2 SERPL-SCNC: 29 MMOL/L (ref 21–32)
CREAT SERPL-MCNC: 0.77 MG/DL (ref 0.6–1.3)
GFR SERPL CREATININE-BSD FRML MDRD: 96 ML/MIN/1.73SQ M
GLUCOSE P FAST SERPL-MCNC: 86 MG/DL (ref 65–99)
POTASSIUM SERPL-SCNC: 3.9 MMOL/L (ref 3.5–5.3)
PROT SERPL-MCNC: 7.5 G/DL (ref 6.4–8.4)
SODIUM SERPL-SCNC: 138 MMOL/L (ref 135–147)

## 2025-07-10 PROCEDURE — 36415 COLL VENOUS BLD VENIPUNCTURE: CPT

## 2025-07-10 PROCEDURE — 80053 COMPREHEN METABOLIC PANEL: CPT

## 2025-07-16 NOTE — PROGRESS NOTES
Name: Melanie Soto      : 1985      MRN: [id number]  Encounter Provider: Elias Wallace MD  Encounter Date: 2025   Encounter department: [facility]OLOG[facility]  :  Assessment & Plan  Encounter for follow-up surveillance of kidney cancer  S/p right parital nephrectomy  KYREE  Stable kidney function  Continue kidney US surveillance given young age and desire to avoid radiation exposure  Can consider MRI abdomen in the future as needed  Follow up 1 year with imaging and labs prior    Orders:    CBC and differential; Future    Basic metabolic panel; Future    US kidney and bladder; Future        History of Present Illness   Melanie Soto is a 39 y.o. female who presents for follow up of renal cell carcinoma s/p right partial nephrectomy , G1 pT1a chromophobe RCC    ECOG 0    New complaints include none    The following portions of the patient's history were reviewed and updated as appropriate: allergies, current medications, past family history, past medical history, past social history, past surgical history and problem list.      Review of Systems   Constitutional: Negative.    HENT: Negative.     Eyes: Negative.    Respiratory: Negative.     Cardiovascular: Negative.    Gastrointestinal: Negative.    Endocrine: Negative.    Genitourinary: Negative.    Musculoskeletal: Negative.    Skin: Negative.    Allergic/Immunologic: Negative.    Neurological: Negative.    Hematological: Negative.    Psychiatric/Behavioral: Negative.            Objective   There were no vitals taken for this visit.    Physical Exam  Vitals reviewed.   Constitutional:       General: She is not in acute distress.     Appearance: Normal appearance. She is well-developed. She is not ill-appearing, toxic-appearing or diaphoretic.   HENT:      Head: Normocephalic and atraumatic.      Nose: Nose normal.     Eyes:      General: No scleral icterus.        Right eye: No discharge.         Left eye: No discharge.       Pupils: Pupils are equal, round, and reactive to light.     Neck:      Thyroid: No thyromegaly.      Trachea: No tracheal deviation.     Cardiovascular:      Rate and Rhythm: Normal rate and regular rhythm.   Pulmonary:      Effort: Pulmonary effort is normal. No respiratory distress.   Abdominal:      General: There is no distension.      Palpations: There is no mass.      Tenderness: There is no abdominal tenderness. There is no right CVA tenderness or left CVA tenderness.     Musculoskeletal:         General: No deformity or signs of injury.      Cervical back: Normal range of motion and neck supple.   Lymphadenopathy:      Cervical: No cervical adenopathy.     Skin:     Coloration: Skin is not jaundiced or pale.      Findings: No erythema or rash.     Neurological:      Mental Status: She is alert and oriented to person, place, and time.      Cranial Nerves: No cranial nerve deficit.      Sensory: No sensory deficit.      Motor: No abnormal muscle tone.      Coordination: Coordination normal.     Psychiatric:         Mood and Affect: Mood normal.         Behavior: Behavior normal.         Thought Content: Thought content normal.         Judgment: Judgment normal.           Results     Lab Results   Component Value Date    GLUCOSE 87 04/27/2015    CALCIUM 8.8 07/10/2025     08/27/2016    K 3.9 07/10/2025    CO2 29 07/10/2025     07/10/2025    BUN 14 07/10/2025    CREATININE 0.77 07/10/2025     Lab Results   Component Value Date    WBC 7.22 06/14/2024    HGB 11.9 06/14/2024    HCT 38.0 06/14/2024    MCV 92 06/14/2024     06/14/2024       Office Urine Dip  No results found for this or any previous visit (from the past hour).

## 2025-07-17 ENCOUNTER — OFFICE VISIT (OUTPATIENT)
Dept: UROLOGY | Facility: CLINIC | Age: 40
End: 2025-07-17
Payer: COMMERCIAL

## 2025-07-17 VITALS
DIASTOLIC BLOOD PRESSURE: 62 MMHG | SYSTOLIC BLOOD PRESSURE: 118 MMHG | OXYGEN SATURATION: 99 % | HEART RATE: 83 BPM | HEIGHT: 67 IN | WEIGHT: 165 LBS | BODY MASS INDEX: 25.9 KG/M2

## 2025-07-17 DIAGNOSIS — Z08 ENCOUNTER FOR FOLLOW-UP SURVEILLANCE OF KIDNEY CANCER: Primary | ICD-10-CM

## 2025-07-17 DIAGNOSIS — Z85.528 ENCOUNTER FOR FOLLOW-UP SURVEILLANCE OF KIDNEY CANCER: Primary | ICD-10-CM

## 2025-07-17 PROCEDURE — 99213 OFFICE O/P EST LOW 20 MIN: CPT | Performed by: UROLOGY

## 2025-07-17 RX ORDER — ROFLUMILAST 1.5 MG/G
CREAM TOPICAL
COMMUNITY
Start: 2025-05-14

## 2025-07-17 NOTE — ASSESSMENT & PLAN NOTE
S/p right parital nephrectomy  KYREE  Stable kidney function  Continue kidney US surveillance given young age and desire to avoid radiation exposure  Can consider MRI abdomen in the future as needed  Follow up 1 year with imaging and labs prior    Orders:    CBC and differential; Future    Basic metabolic panel; Future    US kidney and bladder; Future

## (undated) DEVICE — MEDI-VAC YANKAUER SUCTION HANDLE W/STRAIGHT TIP & CONTROL VENT: Brand: CARDINAL HEALTH

## (undated) DEVICE — SUT ETHILON 3-0 FS-1 18 IN 663G

## (undated) DEVICE — GLOVE SRG BIOGEL ECLIPSE 7.5

## (undated) DEVICE — JP CHANNEL DRAIN 19FR, FULL FLUTES: Brand: JACKSON-PRATT

## (undated) DEVICE — SUT VICRYL 0 CT-1 27 IN J260H

## (undated) DEVICE — ULNAR NERVE PROTECTOR FOAM POSITIONER: Brand: CARDINAL HEALTH

## (undated) DEVICE — JACKSON-PRATT 100CC BULB RESERVOIR: Brand: CARDINAL HEALTH

## (undated) DEVICE — TRAY FOLEY 16FR URIMETER SILICONE SURESTEP

## (undated) DEVICE — COLUMN DRAPE

## (undated) DEVICE — INTENDED FOR TISSUE SEPARATION, AND OTHER PROCEDURES THAT REQUIRE A SHARP SURGICAL BLADE TO PUNCTURE OR CUT.: Brand: BARD-PARKER SAFETY BLADES SIZE 11, STERILE

## (undated) DEVICE — LARGE NEEDLE DRIVER: Brand: ENDOWRIST;DAVINCI SI

## (undated) DEVICE — MONOPOLAR CURVED SCISSORS: Brand: ENDOWRIST

## (undated) DEVICE — SUT SILK 0 30 IN A306H

## (undated) DEVICE — TISSUE RETRIEVAL SYSTEM: Brand: INZII RETRIEVAL SYSTEM

## (undated) DEVICE — DRAPE SHEET THREE QUARTER

## (undated) DEVICE — HEMOSTATIC MATRIX SURGIFLO 8ML W/THROMBIN

## (undated) DEVICE — TROCAR PORT ACCESS 12 X120MM W/BLDLS OPTICAL TIP AIRSEAL

## (undated) DEVICE — PROGRASP FORCEPS: Brand: ENDOWRIST

## (undated) DEVICE — SUT VICRYL 0 UR-6 27 IN J603H

## (undated) DEVICE — VIAL DECANTER

## (undated) DEVICE — GAUZE SPONGES,16 PLY: Brand: CURITY

## (undated) DEVICE — SUT MONOCRYL 0 CTX 36 IN Y398H

## (undated) DEVICE — SUT MONOCRYL 4-0 PS-2 27 IN Y426H

## (undated) DEVICE — ABDOMINAL PAD: Brand: DERMACEA

## (undated) DEVICE — IRRIG ENDO FLO TUBING

## (undated) DEVICE — VISUALIZATION SYSTEM: Brand: CLEARIFY

## (undated) DEVICE — BETHLEHEM MAJOR GENERAL PACK: Brand: CARDINAL HEALTH

## (undated) DEVICE — LARGE, DISPOSABLE ALEXIS O C-SECTION PROTECTOR - RETRACTOR: Brand: ALEXIS ® O C-SECTION PROTECTOR - RETRACTOR

## (undated) DEVICE — HEM-O-LOK CLIP CARTRIDGE LARGE DA VINCI SI/XI

## (undated) DEVICE — SKIN MARKER DUAL TIP WITH RULER CAP, FLEXIBLE RULER AND LABELS: Brand: DEVON

## (undated) DEVICE — ADHESIVE SKN CLSR HISTOACRYL FLEX 0.5ML LF

## (undated) DEVICE — SUT VICRYL 4-0 KS 27 IN J662H

## (undated) DEVICE — FENESTRATED BIPOLAR FORCEPS: Brand: ENDOWRIST

## (undated) DEVICE — ENDOPATH PNEUMONEEDLE INSUFFLATION NEEDLES WITH LUER LOCK CONNECTORS 120MM: Brand: ENDOPATH

## (undated) DEVICE — SURGIFLO ENDOSCOPIC APPICATOR: Brand: ETHICON

## (undated) DEVICE — AIRSEAL TUBE SMOKE EVAC LUMENX3 FILTERED

## (undated) DEVICE — SUT STRATAFIX SPIRAL PDS 2-0 CT-1 45 CM SXPP1B411

## (undated) DEVICE — GLOVE SRG BIOGEL ECLIPSE 8

## (undated) DEVICE — CHLORAPREP HI-LITE 26ML ORANGE

## (undated) DEVICE — PENCIL ELECTROSURG E-Z CLEAN -0035H

## (undated) DEVICE — ARM DRAPE

## (undated) DEVICE — 3M™ IOBAN™ 2 ANTIMICROBIAL INCISE DRAPE 6650EZ: Brand: IOBAN™ 2

## (undated) DEVICE — SYRINGE 10ML LL

## (undated) DEVICE — GLOVE INDICATOR PI UNDERGLOVE SZ 8 BLUE

## (undated) DEVICE — TROCAR: Brand: KII FIOS FIRST ENTRY

## (undated) DEVICE — Device

## (undated) DEVICE — TIP COVER ACCESSORY

## (undated) DEVICE — NEEDLE 25G X 1 1/2

## (undated) DEVICE — SUT PLAIN 2-0 CTX 27 IN 872H

## (undated) DEVICE — PACK C-SECTION PBDS

## (undated) DEVICE — TELFA NON-ADHERENT ABSORBENT DRESSING: Brand: TELFA

## (undated) DEVICE — SURGICEL 4 X 8

## (undated) DEVICE — 3M™ DURAPORE™ SURGICAL TAPE 1538-3, 3 INCH X 10 YARD (7,5CM X 9,1M), 4 ROLLS/BOX: Brand: 3M™ DURAPORE™

## (undated) DEVICE — CANNULA SEAL

## (undated) DEVICE — ADHESIVE SKIN HIGH VISCOSITY EXOFIN 1ML

## (undated) DEVICE — BLADELESS OBTURATOR: Brand: WECK VISTA

## (undated) DEVICE — ASTOUND STANDARD SURGICAL GOWN, XL: Brand: CONVERTORS

## (undated) DEVICE — SUT VLOC 90 3-0 CV-23 9 IN VLOCM0844